# Patient Record
Sex: MALE | Race: WHITE | NOT HISPANIC OR LATINO | Employment: OTHER | URBAN - METROPOLITAN AREA
[De-identification: names, ages, dates, MRNs, and addresses within clinical notes are randomized per-mention and may not be internally consistent; named-entity substitution may affect disease eponyms.]

---

## 2017-11-20 ENCOUNTER — OFFICE VISIT (OUTPATIENT)
Dept: OPHTHALMOLOGY | Facility: CLINIC | Age: 72
End: 2017-11-20
Attending: OPHTHALMOLOGY
Payer: MEDICARE

## 2017-11-20 DIAGNOSIS — H43.811 VITREORETINAL DEGENERATION OF RIGHT EYE: ICD-10-CM

## 2017-11-20 DIAGNOSIS — H35.373 EPIRETINAL MEMBRANE (ERM) OF BOTH EYES: Primary | ICD-10-CM

## 2017-11-20 DIAGNOSIS — H43.812 VITREOUS DEGENERATION AND DETACHMENT OF LEFT EYE: ICD-10-CM

## 2017-11-20 PROCEDURE — 92134 CPTRZ OPH DX IMG PST SGM RTA: CPT | Mod: ZF | Performed by: OPHTHALMOLOGY

## 2017-11-20 PROCEDURE — 99215 OFFICE O/P EST HI 40 MIN: CPT | Mod: ZF

## 2017-11-20 RX ORDER — OMEGA-3S/DHA/EPA/FISH OIL/D3 300MG-1000
1000 CAPSULE ORAL DAILY
COMMUNITY
End: 2017-11-20 | Stop reason: DRUGHIGH

## 2017-11-20 RX ORDER — MINOXIDIL 2 %
1 SOLUTION, NON-ORAL TOPICAL EVERY MORNING
COMMUNITY
Start: 2011-02-22 | End: 2021-05-25

## 2017-11-20 ASSESSMENT — TONOMETRY
OS_IOP_MMHG: 12
IOP_METHOD: TONOPEN
OD_IOP_MMHG: 14

## 2017-11-20 ASSESSMENT — VISUAL ACUITY
METHOD_MR: PT DEFERS
OD_CC: J1+
OD_CC: 20/25
OD_CC+: -3
CORRECTION_TYPE: GLASSES
OS_CC+: -2+1
OS_CC: 20/40 SLOW
METHOD: SNELLEN - LINEAR
OS_CC: J2
OS_PH_CC: 20/30 SLOW

## 2017-11-20 ASSESSMENT — REFRACTION_WEARINGRX
OD_SPHERE: -6.00
SPECS_TYPE: BIFOCAL
OS_ADD: +2.75
OS_AXIS: 108
OD_AXIS: 100
OD_CYLINDER: +1.00
OS_CYLINDER: +1.50
OS_SPHERE: -9.50
OD_ADD: +2.75

## 2017-11-20 ASSESSMENT — CONF VISUAL FIELD
OD_NORMAL: 1
OS_NORMAL: 1

## 2017-11-20 ASSESSMENT — SLIT LAMP EXAM - LIDS
COMMENTS: NORMAL
COMMENTS: NORMAL

## 2017-11-20 ASSESSMENT — CUP TO DISC RATIO
OD_RATIO: 0.2
OS_RATIO: 0.2

## 2017-11-20 ASSESSMENT — EXTERNAL EXAM - RIGHT EYE: OD_EXAM: NORMAL

## 2017-11-20 ASSESSMENT — EXTERNAL EXAM - LEFT EYE: OS_EXAM: NORMAL

## 2017-11-20 NOTE — NURSING NOTE
Chief Complaints and History of Present Illnesses   Patient presents with     Follow Up For     annual exam; history of RD with repair in both eyes with scleral buckle     HPI    Affected eye(s):  Both   Symptoms:     No decreased vision   No difficulty with reading   Floaters (Comment: occasional/longstanding)   No flashes   Redness (Comment: both eyes usually red upon awakening)      Duration:  1 year   Frequency:  Constant       Do you have eye pain now?:  No      Comments:  Pt here for annual eye exam; history of RD with repair with scleral anny in both eyes  Pt states vision stable at distance and near, noting some issues with arm's length distance  Pt states driving at night has become quite difficult - wants to discuss cataracts    Pt uses Red Eye Relief drops PRN (about 5 times a week) for redness    Ale Rose COA 7:35 AM November 20, 2017

## 2017-11-20 NOTE — MR AVS SNAPSHOT
After Visit Summary   11/20/2017    Filemon Modi    MRN: [id number]           Patient Information     Date Of Birth          1945        Visit Information        Provider Department      11/20/2017 8:00 AM Nicolette Lipscomb MD [facility]        Today's Diagnoses     Epiretinal membrane (ERM) of both eyes    -  1    Vitreous degeneration and detachment of left eye        Vitreoretinal degeneration of right eye           Follow-ups after your visit        Follow-up notes from your care team     Return in about 1 year (around 11/20/2018) for OCT OU.      Future tests that were ordered for you today     Open Future Orders        Priority Expected Expires Ordered    OCT Retina Spectralis OU (both eyes) Routine  5/24/2019 11/20/2017            Who to contact     Please call your clinic at [phone] to:    Ask questions about your health    Make or cancel appointments    Discuss your medicines    Learn about your test results    Speak to your doctor   If you have compliments or concerns about an experience at your clinic, or if you wish to file a complaint, please contact [facility] Patient Relations at [phone] or email us at [email or web address]         Additional Information About Your Visit        MyChart Information     [employer] gives you secure access to your electronic health record. If you see a primary care provider, you can also send messages to your care team and make appointments. If you have questions, please call your primary care clinic.  If you do not have a primary care provider, please call [phone] and they will assist you.      [employer] is an electronic gateway that provides easy, online access to your medical records. With [employer], you can request a clinic appointment, read your test results, renew a prescription or communicate with your care team.     To access your existing account, please contact your [facility] or call [phone] for assistance.        Care EveryWhere ID     This is your Care EveryWhere ID. This could be used by other organizations to access your [facility] medical records  [id number]         Blood Pressure from Last 3 Encounters:   06/23/12 130/88    Weight from Last 3 Encounters:   No data found for Wt              We Performed the Following     OCT Retina Spectralis OU (both eyes)          Today's Medication Changes          These changes are accurate as of: 11/20/17  8:49 AM.  If you have any questions, ask your nurse or doctor.               These medicines have changed or have updated prescriptions.        Dose/Directions    [facility] FISH OIL 1200 MG Caps   This may have changed:  Another medication with the same name was removed. Continue taking this medication, and follow the directions you see here.   Changed by:  Nicolette Lipscomb MD        Dose:  1 capsule   Take 1 capsule by mouth daily   Refills:  0       VITAMIN D PO   This may have changed:  Another medication with the same name was removed. Continue taking this medication, and follow the directions you see here.   Changed by:  Nicolette Lipscomb MD        Dose:  1000 mg   Take 1,000 mg by mouth daily   Refills:  0                Primary Care Provider Office Phone # Fax #    Shane Butler [phone]       [facility] [address]  [facility] [address]        Equal Access to Services     [facility] Hospital[facility] AH: Hadii cachorro ku hadasho Soomaali, waaxda luqadaha, qaybta kaalmada adeegyada, waxfaustina caputo . So [facility] [phone].    ATENCIÓN: Si habla español, tiene a stewart disposición servicios gratuitos de asistencia lingüística. Llame al [phone].    We comply with applicable federal civil rights laws and [place] laws. We do not discriminate on the basis of race, color, national origin, age, disability, sex, sexual orientation, or gender identity.             Thank you!     Thank you for choosing [facility] CLINIC  for your care. Our goal is always to provide you with excellent care. Hearing back from our patients is one way we can continue to improve our services. Please take a few minutes to complete the written survey that you may receive in the mail after your visit with us. Thank you!             Your Updated Medication List - Protect others around you: Learn how to safely use, store and throw away your medicines at www.disposemymeds.org.          This list is accurate as of: 11/20/17  8:49 AM.  Always use your most recent med list.                   Brand Name Dispense Instructions for use Diagnosis    ASPIRIN PO      Take 81 mg by mouth daily.        [facility] FISH OIL 1200 MG Caps      Take 1 capsule by mouth daily        LEVOTHROID PO      Take 125 mcg by mouth daily.        VITAMIN D PO      Take 1,000 mg by mouth daily

## 2017-11-20 NOTE — PROGRESS NOTES
CC -   H/o RD OU    INTERVAL HISTORY - Initial visit with me.  Saw SM 1 year ago.  Noticing glare & mild haze OU    HPI -   Filemon Modi is a  72 year old year-old patient with history of retinal detachment repair both eyes with SBP + small gas bubble.  OS VA worse than OD all life, unsure if ever correctable to 20/20, large anisometropia.      PAST OCULAR SURGERY  SBP OS 2006 (Sommer)  SBP OD 2011 (JT)      RETINAL IMAGING:  OCT  11-20-17  OD - tr ERM, PVD, stable  OS - tr ERM, PVD, stable      ASSESSMENT & PLAN    1. H/o RD repair OU with SBP + gas bubble   - 2006 OS & 2011 OD   - retina flat    2. ERM OU   - trace on OCT   - NVS    3.  PVD OU   - S/Sx RD    4. NS OU   - bothered by glare, may seek eval   - unclear vision potential OS   - BCVA 2009 was 20/25-2, typically 20/30-20/40   - may have mild amblyopia    5.  Anisometropia   - per patient WRx doesn't incorporate full OS correction               return to clinic: 1 year OCT OU    ATTESTATION     Attending Attestation:     Complete documentation of historical and exam elements from today's encounter can be found in the full encounter summary report (not reduplicated in this progress note).  I personally obtained the chief complaint(s) and history of present illness.  I confirmed and edited as necessary the review of systems, past medical/surgical history, family history, social history, and examination findings as documented by others; and I examined the patient myself.  I personally reviewed the relevant tests, images, and reports as documented above.  I formulated and edited as necessary the assessment and plan and discussed the findings and management plan with the patient and family    Nicolette Lipscomb MD, PhD  , Vitreoretinal Surgery  Department of Ophthalmology  [facility]

## 2019-01-07 DIAGNOSIS — H35.373 EPIRETINAL MEMBRANE (ERM) OF BOTH EYES: Primary | ICD-10-CM

## 2019-01-08 DIAGNOSIS — H25.13 NUCLEAR SENILE CATARACT OF BOTH EYES: Primary | ICD-10-CM

## 2019-01-09 ENCOUNTER — OFFICE VISIT (OUTPATIENT)
Dept: OPHTHALMOLOGY | Facility: CLINIC | Age: 74
End: 2019-01-09
Attending: OPHTHALMOLOGY
Payer: COMMERCIAL

## 2019-01-09 DIAGNOSIS — H35.373 EPIRETINAL MEMBRANE (ERM) OF BOTH EYES: ICD-10-CM

## 2019-01-09 DIAGNOSIS — H25.13 NUCLEAR SCLEROTIC CATARACT OF BOTH EYES: Primary | ICD-10-CM

## 2019-01-09 DIAGNOSIS — H52.13 HIGH MYOPIA, BOTH EYES: ICD-10-CM

## 2019-01-09 DIAGNOSIS — H04.123 DRY EYES, BILATERAL: ICD-10-CM

## 2019-01-09 DIAGNOSIS — Z86.69 HISTORY OF RETINAL DETACHMENT: ICD-10-CM

## 2019-01-09 DIAGNOSIS — H52.31 ANISOMETROPIA: ICD-10-CM

## 2019-01-09 PROCEDURE — 92015 DETERMINE REFRACTIVE STATE: CPT | Mod: ZF

## 2019-01-09 PROCEDURE — G0463 HOSPITAL OUTPT CLINIC VISIT: HCPCS | Mod: ZF

## 2019-01-09 PROCEDURE — 76516 ECHO EXAM OF EYE: CPT | Mod: ZF | Performed by: OPHTHALMOLOGY

## 2019-01-09 PROCEDURE — 92134 CPTRZ OPH DX IMG PST SGM RTA: CPT | Mod: ZF | Performed by: OPHTHALMOLOGY

## 2019-01-09 ASSESSMENT — SLIT LAMP EXAM - LIDS
COMMENTS: NORMAL
COMMENTS: NORMAL

## 2019-01-09 ASSESSMENT — VISUAL ACUITY
OS_BAT_HIGH: 20/50
OD_PH_CC: 20/30
OD_BAT_LOW: 20/30
OD_CC: 20/40
OS_PH_CC: 20/30
OS_CC: 20/40
METHOD: SNELLEN - LINEAR
OD_BAT_MED: 20/30
OS_BAT_LOW: 20/30
OD_BAT_HIGH: 20/30
OS_BAT_MED: 20/50
CORRECTION_TYPE: GLASSES

## 2019-01-09 ASSESSMENT — REFRACTION_MANIFEST
OS_ADD: +2.75
OD_ADD: +2.75
OS_SPHERE: -9.75
OD_AXIS: 100
OD_SPHERE: -6.50
OS_AXIS: 105
OS_CYLINDER: +1.50
OD_CYLINDER: +0.75

## 2019-01-09 ASSESSMENT — TONOMETRY
OS_IOP_MMHG: 12
IOP_METHOD: TONOPEN
OD_IOP_MMHG: 10

## 2019-01-09 ASSESSMENT — REFRACTION_WEARINGRX
OD_ADD: +2.75
OD_CYLINDER: +1.00
OD_SPHERE: -6.00
OS_ADD: +2.75
OS_SPHERE: -9.50
OS_AXIS: 108
SPECS_TYPE: BIFOCAL
OD_AXIS: 100
OS_CYLINDER: +1.50

## 2019-01-09 ASSESSMENT — EXTERNAL EXAM - RIGHT EYE: OD_EXAM: NORMAL

## 2019-01-09 ASSESSMENT — CONF VISUAL FIELD
OS_NORMAL: 1
OD_NORMAL: 1
METHOD: COUNTING FINGERS

## 2019-01-09 ASSESSMENT — CUP TO DISC RATIO
OS_RATIO: 0.2
OD_RATIO: 0.2

## 2019-01-09 ASSESSMENT — EXTERNAL EXAM - LEFT EYE: OS_EXAM: NORMAL

## 2019-01-09 NOTE — NURSING NOTE
Chief Complaints and History of Present Illnesses   Patient presents with     Yearly Exam     Chief Complaint(s) and History of Present Illness(es)     Yearly Exam     Laterality: both eyes    Onset: gradual    Onset: years ago    Quality: Gradual change in the va      Frequency: constantly    Associated symptoms: glare.  Negative for floaters, flashes and dryness    Treatments tried: no treatments    Pain scale: 0/10              Comments     Wanting to see where he is at for cataracts but is leaning more toward gls  Mellisa Fabian COT 7:13 AM January 9, 2019

## 2019-01-09 NOTE — PROGRESS NOTES
HPI:  Filemon Modi is 73 year old male who is here for a CEE and discussion of potential cataract surgery and new MRx. States that nighttime driving has been more difficult, due to glare with oncoming headlights.   History of retinal detachment repair both eyes with SBP + small gas bubble.  OS VA worse than OD all life, unsure if ever correctable to 20/20, large anisometropia.      PAST OCULAR SURGERY  SBP OS 2006 (Kemal)  SBP OD 2011 (PHIL)    RETINAL IMAGING:  OCT 12/9/2019  od - stable erm   Os - stable erm       ASSESSMENT & PLAN  (H25.13) Nuclear sclerotic cataract of both eyes  (primary encounter diagnosis)  Comment: Visually significant with BCVA of 20/25 and 20/30 with glare to 20/30 and 20/50. Moderate NS with cortical changes and PSC.    Dilates to: 8 mm  Alpha blockers/Flomax: None  Trauma/Pseudoxfoliation: SB both eyes  Fuchs dystrophy/guttae: None    Diabetes: No  Anticoagulation: None    Cyl: No toric.    We discussed the risks and benefits of cataract surgery, and informed consent was obtained.  Proceed with CE/IOL left eye followed by right eye. Discussed refractive target. He does not mind wearing glasses and would like to maintain some myopia. Vision may be limited by retinal detachment/repair and possible mild amblyopia left eye.    Surgical plan:  Topical  Faculty  Aim -1.50 right eye and -2.50 left eye     (H35.373) Epiretinal membrane (ERM) of both eyes  Comment: Stable  Plan: Monitor    (Z86.69) History of retinal detachment - Both Eyes  Comment: History of RD repair both eyes with SB and gas bubble (2006 left eye and 2011 right eye), stable today with retina flat  Plan: Observe    (H04.123) Dry eyes, bilateral  Comment: Inferior PEE both eyes.  Plan: Recommend avoid Visine, start ATs 2-3 x daily.    (H52.13) High myopia, both eyes  (H52.31) Anisometropia  Comment: Increased anisometropia after buckle left eye per patient.  Plan: Hold off on updating glasses Rx until after CE/IOL    Return for  scheduled procedure.     Vinayak Sanchez MD  Ophthalmology Resident  PGY-2    Teaching statement:  Complete documentation of historical and exam elements from today's encounter can be found in the full encounter summary report (not reduplicated in this progress note). I personally obtained the chief complaint(s) and history of present illness.  I confirmed and edited as necessary the review of systems, past medical/surgical history, family history, social history, and examination findings as documented by others; and I examined the patient myself. I personally reviewed the relevant tests, images, and reports as documented above.     I formulated and edited as necessary the assessment and plan and discussed the findings and management plan with the patient and family.    Ale Herndon MD  Comprehensive Ophthalmology & Ocular Pathology  Department of Ophthalmology and Visual Neurosciences  [email or web address]  Pager [phone]

## 2019-01-14 ENCOUNTER — PATIENT OUTREACH (OUTPATIENT)
Dept: CARE COORDINATION | Facility: CLINIC | Age: 74
End: 2019-01-14

## 2019-01-17 ENCOUNTER — APPOINTMENT (OUTPATIENT)
Dept: SURGERY | Facility: CLINIC | Age: 74
End: 2019-01-17
Payer: COMMERCIAL

## 2019-01-17 ENCOUNTER — TELEPHONE (OUTPATIENT)
Dept: OPHTHALMOLOGY | Facility: CLINIC | Age: 74
End: 2019-01-17

## 2019-01-17 ENCOUNTER — OFFICE VISIT (OUTPATIENT)
Dept: SURGERY | Facility: CLINIC | Age: 74
End: 2019-01-17
Payer: COMMERCIAL

## 2019-01-17 ENCOUNTER — ANESTHESIA EVENT (OUTPATIENT)
Dept: SURGERY | Facility: AMBULATORY SURGERY CENTER | Age: 74
End: 2019-01-17

## 2019-01-17 VITALS
HEART RATE: 67 BPM | TEMPERATURE: 97.9 F | OXYGEN SATURATION: 98 % | BODY MASS INDEX: 23.76 KG/M2 | HEIGHT: 74 IN | DIASTOLIC BLOOD PRESSURE: 82 MMHG | RESPIRATION RATE: 16 BRPM | WEIGHT: 185.1 LBS | SYSTOLIC BLOOD PRESSURE: 152 MMHG

## 2019-01-17 DIAGNOSIS — H26.9 CATARACT OF BOTH EYES, UNSPECIFIED CATARACT TYPE: ICD-10-CM

## 2019-01-17 DIAGNOSIS — H25.12 NUCLEAR SCLEROTIC CATARACT, LEFT: Primary | ICD-10-CM

## 2019-01-17 DIAGNOSIS — Z01.818 PREOP EXAMINATION: ICD-10-CM

## 2019-01-17 DIAGNOSIS — Z01.818 PREOP EXAMINATION: Primary | ICD-10-CM

## 2019-01-17 LAB
CREAT SERPL-MCNC: 0.74 MG/DL (ref 0.66–1.25)
GFR SERPL CREATININE-BSD FRML MDRD: >90 ML/MIN/{1.73_M2}
HGB BLD-MCNC: 16 G/DL (ref 13.3–17.7)
POTASSIUM SERPL-SCNC: 4.3 MMOL/L (ref 3.4–5.3)

## 2019-01-17 RX ORDER — OFLOXACIN 3 MG/ML
SOLUTION/ DROPS OPHTHALMIC
Qty: 1 BOTTLE | Refills: 0 | Status: SHIPPED | OUTPATIENT
Start: 2019-01-17 | End: 2019-02-18

## 2019-01-17 RX ORDER — PREDNISOLONE ACETATE 10 MG/ML
SUSPENSION/ DROPS OPHTHALMIC
Qty: 1 BOTTLE | Refills: 1 | Status: SHIPPED | OUTPATIENT
Start: 2019-01-17 | End: 2019-02-18

## 2019-01-17 ASSESSMENT — MIFFLIN-ST. JEOR: SCORE: 1659.36

## 2019-01-17 NOTE — PATIENT INSTRUCTIONS
Preparing for Your Surgery      Name:  Filemon Modi   MRN:  [id number]   :  1945   Today's Date:  2019     Arriving for surgery:  Surgery date:  19  Arrival time:  6:15 am    Please come to:  [facility]  [address] [place] [id number]     Parking is available in front of the Clinics and Surgery Center building from 5:30AM to 8:00PM.  -  Proceed to the 5th floor to check into the Ambulatory Surgery Center.              >> There will be patient concierges on the 1st and 5th floor, for assistance or an escort, if you would like.              >> Please call [phone] with any questions day of surgery.    -  Bring your ID and insurance card.    What can I eat or drink?  -  You may have solid food or milk products until 8 hours prior to your surgery. (Until 11:45 pm 19 )  -  You may have water, apple juice, clear BLACK coffee (NO creamer or nondairy creamer), or 7up/Sprite until 2 hours prior to your surgery. (Until 5:45 am )    Which medicines can I take?       -  Do not bring your own medications to the hospital.        -  Follow Ophthalmology Clinic instructions regarding Ibuprofen. If no instructions given, NO Ibuprofen the day prior to surgery.     -  Do NOT take these medications in the morning, the day of surgery:  Vitamin D, Fish Oil    -  Please take these medications the morning of surgery:  Levothyroxine      Acetaminophen (Tylenol)    How do I prepare myself?  -  Take two showers: one the night before surgery; and one the morning of surgery.         Use Scrubcare or Hibiclens to wash from neck down.  You may use your own shampoo and conditioner. No other hair products.   -  Do NOT use lotion, powder, colognes, deodorant, or antiperspirant the day of your surgery.  -  Do NOT wear any jewelry.    Questions or Concerns:  If you have questions or concerns prior to your surgery, call [phone]. (Mon - Fri   8 am- 5:30 pm)  If your surgery is  at the Ambulatory Surgery Center, please call [phone]. (Mon - Fri 8 am- 3:30 pm)  Questions after surgery, contact your Surgeons office.      AFTER YOUR SURGERY  Breathing exercises   Breathing exercises help you recover faster. Take deep breaths and let the air out slowly. This will:     Help you wake up after surgery.    Help prevent complications like pneumonia.  Preventing complications will help you go home sooner.   Nausea and vomiting   You may feel sick to your stomach after surgery; if so, let your nurse know.    Pain control:  After surgery, you may have pain. Our goal is to help you manage your pain. Pain medicine will help you feel comfortable enough to do activities that will help you heal.  These activities may include breathing exercises, walking and physical therapy.   To help your health care team treat your pain we will ask: 1) If you have pain  2) where it is located 3) describe your pain in your words  Methods of pain control include medications given by mouth, vein or by nerve block for some surgeries.  Sequential Compression Device (SCD) or Pneumo Boots:  You may need to wear SCD S on your legs or feet. These are wraps connected to a machine that pumps in air and releases it. The repeated pumping helps prevent blood clots from forming.

## 2019-01-17 NOTE — TELEPHONE ENCOUNTER
Called patient regarding procedure with Dr. Herndon on 1/18/19. Advised new surgery time 7: 45 am. Advised RN will go over instructions at Pre op appointment this afternoon.

## 2019-01-17 NOTE — RESULT ENCOUNTER NOTE
Elidia Colbert,    Your test results are attached.  All of your labs are within normal limits and good for surgery tomorrow.         Caty Colindres DNP, RN, ANP-C

## 2019-01-17 NOTE — ANESTHESIA PREPROCEDURE EVALUATION
Anesthesia Pre-Procedure Evaluation    Patient: Filemon Modi   MRN:     [id number] Gender:   male   Age:    73 year old :      1945        Preoperative Diagnosis: Visually Significant Cataract, Bilateral Eyes   Procedure(s):  Left Eye Cataract Removal with Intraocular Lens Implant     Past Medical History:   Diagnosis Date     Bilateral cataracts      Malignant neoplasm (H)     colon     Thyroid disease       Past Surgical History:   Procedure Laterality Date     ABDOMEN SURGERY      bowel resection     APPENDECTOMY       EYE SURGERY  2011    retinal detachment repair with scleral buckle     EYE SURGERY  2002    retinal reattachment for LE     HERNIA REPAIR      bilateral     RETINAL REATTACHMENT            Anesthesia Evaluation     . Pt has had prior anesthetic. Type: General and MAC    No history of anesthetic complications          ROS/MED HX    ENT/Pulmonary:  - neg pulmonary ROS     Neurologic:  - neg neurologic ROS     Cardiovascular:     (+) ----. : . . . :. . Previous cardiac testing date:results:date: results:ECG reviewed date: results:Sinus bradycardia date: results:          METS/Exercise Tolerance:  >4 METS   Hematologic:     (+) History of Transfusion no previous transfusion reaction -      Musculoskeletal:   (+) , , other musculoskeletal- periodic low back pain secondary to DDD      GI/Hepatic:  - neg GI/hepatic ROS       Renal/Genitourinary:  - ROS Renal section negative       Endo:     (+) thyroid problem hypothyroidism, .      Psychiatric:  - neg psychiatric ROS       Infectious Disease:  - neg infectious disease ROS       Malignancy:   (+) Malignancy History of GI  GI CA  Remission status post Surgery,         Other:    (+) no H/O Chronic Pain,                       PHYSICAL EXAM:   Mental Status/Neuro: A/A/O   Airway: Facies: Feasible  Mallampati: I  Mouth/Opening: Full  TM distance: > 6 cm  Neck ROM: Full   Respiratory: Auscultation: CTAB     Resp. Rate: Normal     Resp. Effort:  "Normal      CV: Rhythm: Regular  Rate: Age appropriate  Heart: Normal Sounds   Comments:      Dental: Normal                  Lab Results   Component Value Date    WBC 6.5 05/31/2011    HGB 15.7 05/31/2011    HCT 47.2 05/31/2011     05/31/2011    SED 1 04/20/2006     04/20/2006    POTASSIUM 3.5 04/20/2006    CHLORIDE 102 04/20/2006    CO2 28 04/20/2006    BUN 15 04/20/2006    CR 0.96 04/20/2006     (H) 04/20/2006    BRYCE 8.9 04/20/2006    ALBUMIN 4.4 04/20/2006    PROTTOTAL 7.0 04/20/2006    ALT 24 04/20/2006    AST 21 04/20/2006    ALKPHOS 74 04/20/2006    BILITOTAL 0.5 04/20/2006    LIPASE 64 04/20/2006    AMYLASE 60 04/20/2006    PTT 24 04/20/2006    INR 0.98 04/20/2006       Preop Vitals  BP Readings from Last 3 Encounters:   01/17/19 (!) 164/97   06/23/12 130/88    Pulse Readings from Last 3 Encounters:   01/17/19 67   06/23/12 60      Resp Readings from Last 3 Encounters:   01/17/19 16   06/23/12 18    SpO2 Readings from Last 3 Encounters:   01/17/19 98%   06/23/12 100%      Temp Readings from Last 1 Encounters:   01/17/19 97.9  F (36.6  C) (Oral)    Ht Readings from Last 1 Encounters:   01/17/19 1.88 m (6' 2\")      Wt Readings from Last 1 Encounters:   01/17/19 84 kg (185 lb 1.6 oz)    Estimated body mass index is 23.77 kg/m  as calculated from the following:    Height as of this encounter: 1.88 m (6' 2\").    Weight as of this encounter: 84 kg (185 lb 1.6 oz).     LDA:            Assessment:   ASA SCORE: 2       Documentation: H&P complete; Preop Testing complete; Consents complete   Proceeding: Proceed without further delay  Tobacco Use:  Active user of Tobacco     Plan:   Anes. Type:  MAC   Pre-Induction: Midazolam IV   Induction:  Not applicable   Airway: Native Airway   Access/Monitoring: PIV   Maintenance: N/a   Emergence: N/a   Logistics: Same Day Surgery     Postop Pain/Sedation Strategy:  Standard-Options: Opioids PRN     PONV Management:  Adult Risk Factors:, Postop " Opioids  Prevention: Ondansetron     CONSENT: Direct conversation   Plan and risks discussed with: Patient   Blood Products: Consent Deferred (Minimal Blood Loss)       Comments for Plan/Consent:  Plan:  MAC with routine monitors    Rafi Damian MD                  PAC Discussion and Assessment    ASA Classification: 2  Case is suitable for: [place]  Anesthetic techniques and relevant risks discussed: GA  Invasive monitoring and risk discussed:   Types:   Possibility and Risk of blood transfusion discussed:   NPO instructions given:   Additional anesthetic preparation and risks discussed:   Needs early admission to pre-op area:   Other:     PAC Resident/NP Anesthesia Assessment:  Filemon Modi is a 73 year old male scheduled for Left Eye Cataract Removal with Intraocular Lens Implant on 1/18/2019 and a right Eye Cataract Removal with Intraocular Lens Implant on 1/25/2019 by Dr. Herndon in treatment of bilateral cataracts.  PAC referral for risk assessment and optimization for anesthesia with comorbid conditions of: hypothyroidism and history of colon cancer.    Pre-operative considerations:  1.  Cardiac:  Functional status- METS >4.  He exercises regularly at the gym doing stretching, cardio and weight training.  He has no known cardiac conditions.  Low risk surgery with 0.4% risk of major adverse cardiac event.   2.  Pulm:  Airway feasible.  JONO risk: low.   3.  GI:  Risk of PONV score = 1.  If > 2, anti-emetic intervention recommended.  4. Musculoskeletal:  He has periodic low back pain.  Consider cautious positioning if indicated.     VTE risk: 1.8%    Patient is optimized and is acceptable candidate for the proposed procedure.  No further diagnostic evaluation is needed.       **For further details of assessment, testing, and physical exam please see H and P completed on same date.          Caty Colindres DNP, RN, APRN      Reviewed and Signed by PAC Mid-Level Provider/Resident  Mid-Level Provider/Resident:  Caty Colindres DNP, RN, APRN  Date: 1/17/2019  Time: 1530    Attending Anesthesiologist Anesthesia Assessment:        Anesthesiologist:   Date:   Time:   Pass/Fail:   Disposition:     PAC Pharmacist Assessment:        Pharmacist:   Date:   Time:        Caty Colindres, APRN CNP

## 2019-01-17 NOTE — H&P
Pre-Operative H & P     CC:  Preoperative exam to assess for increased cardiopulmonary risk while undergoing surgery and anesthesia.    Date of Encounter: 1/17/2019  Primary Care Physician:  Joaquim Velez  Filemon Modi is a 73 year old male who presents for pre-operative H & P in preparation for Left Eye Cataract Removal with Intraocular Lens Implant with Dr. Herndon 1/18/2019 and right Eye Cataract Removal with Intraocular Lens Implant on 1/25/2019 at [facility].     Filemon Modi is a 73 year old male with hypothyroidism and history of colon cancer that has bilateral cataracts.  Per Dr. Herndon's note, these are visibly significant cataracts.  He notes noticeable impairment with night time driving.  The above listed surgeries have been recommended for treatment.     History is obtained from the patient.     Past Medical History  Past Medical History:   Diagnosis Date     Bilateral cataracts      Malignant neoplasm (H)     colon     Thyroid disease        Past Surgical History  Past Surgical History:   Procedure Laterality Date     ABDOMEN SURGERY      bowel resection     APPENDECTOMY       COLONOSCOPY  01/04/2018     EYE SURGERY  6/01/2011    retinal detachment repair with scleral buckle     EYE SURGERY  2002    retinal reattachment for LE     HERNIA REPAIR      x2     RETINAL REATTACHMENT      x2       Hx of Blood transfusions/reactions: yes, no reactions    Hx of abnormal bleeding or anti-platelet use: none      Steroid use in the last year: none    Personal or FH with difficulty with Anesthesia:  none    Prior to Admission Medications  Current Outpatient Medications   Medication Sig Dispense Refill     Cholecalciferol (VITAMIN D PO) Take 1,000 mg by mouth every morning        Levothyroxine Sodium (LEVOTHROID PO) Take 125 mcg by mouth every morning        Omega-3 Fatty Acids ([employer] FISH OIL) 1200 MG CAPS Take 1 capsule by mouth every morning        ofloxacin (OCUFLOX) 0.3 % ophthalmic  solution 1 drop 4x daily in the surgical eye for 1 week after surgery, then stop 1 Bottle 0     prednisoLONE acetate (PRED FORTE) 1 % ophthalmic suspension 1 drop in surgical eye as directed, 4x daily after surgery for 1 week, 3x daily for 1 week, 2x daily for 1 week, daily for 1 week, then stop 1 Bottle 1       Allergies  No Known Allergies    Social History  Social History     Socioeconomic History     Marital status: Single     Spouse name: Not on file     Number of children: 0     Years of education: Not on file     Highest education level: Not on file   Social Needs     Financial resource strain: Not on file     Food insecurity - worry: Not on file     Food insecurity - inability: Not on file     Transportation needs - medical: Not on file     Transportation needs - non-medical: Not on file   Occupational History     Occupation: retired   Tobacco Use     Smoking status: Never Smoker     Smokeless tobacco: Never Used   Substance and Sexual Activity     Alcohol use: Yes     Alcohol/week: 4.2 oz     Types: 7 Glasses of wine per week     Comment: wine daily     Drug use: Yes     Types: Marijuana     Sexual activity: Not on file   Other Topics Concern     Not on file   Social History Narrative     Not on file       Family History  Family History   Problem Relation Age of Onset     Chronic Obstructive Pulmonary Disease Mother      Alzheimer Disease Mother      Hypertension Mother      Alcoholism Father      Back Pain Father      Pneumonia Father          from pneumonia s/p having a stroke during a cholecystectomy     Cerebrovascular Disease Father      Hyperlipidemia Brother      Thyroid Disease Brother      Cerebrovascular Disease Maternal Grandfather      Myocardial Infarction Maternal Grandfather      Suicide Brother      Other - See Comments Brother          from hyperthermia in a sauna     Lymphoma Brother      Unknown/Adopted Paternal Grandfather      Glaucoma No family hx of      Macular Degeneration  "No family hx of                ROS/MED HX    The complete review of systems is negative other than noted in the HPI or here.   ENT/Pulmonary:  - neg pulmonary ROS     Neurologic:  - neg neurologic ROS     Cardiovascular:     (+) ----. : . . . :. . Previous cardiac testing date:results:date: results:ECG reviewed date:2013 results:Sinus bradycardia date: results:          METS/Exercise Tolerance:  >4 METS   Hematologic:     (+) History of Transfusion no previous transfusion reaction -      Musculoskeletal:   (+) , , other musculoskeletal- periodic low back pain secondary to DDD      GI/Hepatic:  - neg GI/hepatic ROS       Renal/Genitourinary:  - ROS Renal section negative       Endo:     (+) thyroid problem hypothyroidism, .      Psychiatric:  - neg psychiatric ROS       Infectious Disease:  - neg infectious disease ROS       Malignancy:   (+) Malignancy History of GI  GI CA  Remission status post Surgery,         Other:    (+) no H/O Chronic Pain,                   Temp: 97.9  F (36.6  C) Temp src: Oral BP: 152/82 Pulse: 67   Resp: 16 SpO2: 98 %         185 lbs 1.6 oz  6' 2\"   Body mass index is 23.77 kg/m .       Physical Exam  Constitutional: Awake, alert, cooperative, no apparent distress, and appears stated age.  Eyes: Pupils equal, round and reactive to light, extra ocular muscles intact, sclera clear, conjunctiva normal.  HENT: Normocephalic, oral pharynx with moist mucus membranes. Dentition - upper bridge, multiple caps. No goiter appreciated.   Respiratory: Clear to auscultation bilaterally, no crackles or wheezing.  Cardiovascular: Regular rate and rhythm, normal S1 and S2, and no murmur noted.  Carotids +2, no bruits. No edema. Palpable pulses to radial  DP and PT arteries.   GI: Normal bowel sounds, soft, non-distended, non-tender, no masses palpated, no hepatosplenomegaly.    Lymph/Hematologic: No cervical lymphadenopathy and no supraclavicular lymphadenopathy.  Genitourinary:  deferred  Skin: Warm and " dry.  No rashes at anticipated surgical site.   Musculoskeletal: Full ROM of neck. There is no redness, warmth, or swelling of the exposed joints. Gross motor strength is normal.    Neurologic: Awake, alert, oriented to name, place and time. Cranial nerves II-XII are grossly intact. Gait is normal.   Neuropsychiatric: Calm, cooperative. Normal affect.     Labs: (personally reviewed)   Component      Latest Ref Rng & Units 2019   Creatinine      0.66 - 1.25 mg/dL 0.74   GFR Estimate      >60 mL/min/1.73:m2 >90   GFR Estimate If Black      >60 mL/min/1.73:m2 >90   Potassium      3.4 - 5.3 mmol/L 4.3   Hemoglobin      13.3 - 17.7 g/dL 16.0       EK  Sinus iesha      Outside records reviewed from:   Care Everywhere      ASSESSMENT and PLAN  Filemon Modi is a 73 year old male scheduled for Left Eye Cataract Removal with Intraocular Lens Implant on 2019 and a right Eye Cataract Removal with Intraocular Lens Implant on 2019 by Dr. Herndon in treatment of bilateral cataracts.  PAC referral for risk assessment and optimization for anesthesia with comorbid conditions of: hypothyroidism and history of colon cancer.    Pre-operative considerations:  1.  Cardiac:  Functional status- METS >4.  He exercises regularly at the gym doing stretching, cardio and weight training.  He has no known cardiac conditions.  Low risk surgery with 0.4% risk of major adverse cardiac event.   2.  Pulm:  Airway feasible.  JONO risk: low.   3.  GI:  Risk of PONV score = 1.  If > 2, anti-emetic intervention recommended.  4. Musculoskeletal:  He has periodic low back pain.  Consider cautious positioning if indicated.     VTE risk: 1.8%    Patient is optimized and is acceptable candidate for the proposed procedure.  No further diagnostic evaluation is needed.                 Caty Colindres DNP, RN, APRN  Preoperative Assessment Center  [facility]  Clinic and Surgery Center  Phone: [phone]  Fax: [phone]

## 2019-01-18 ENCOUNTER — OFFICE VISIT (OUTPATIENT)
Dept: OPHTHALMOLOGY | Facility: CLINIC | Age: 74
End: 2019-01-18
Payer: COMMERCIAL

## 2019-01-18 ENCOUNTER — HOSPITAL ENCOUNTER (OUTPATIENT)
Facility: AMBULATORY SURGERY CENTER | Age: 74
End: 2019-01-18
Attending: OPHTHALMOLOGY
Payer: COMMERCIAL

## 2019-01-18 ENCOUNTER — ANESTHESIA (OUTPATIENT)
Dept: SURGERY | Facility: AMBULATORY SURGERY CENTER | Age: 74
End: 2019-01-18

## 2019-01-18 VITALS
OXYGEN SATURATION: 98 % | DIASTOLIC BLOOD PRESSURE: 89 MMHG | BODY MASS INDEX: 23.76 KG/M2 | RESPIRATION RATE: 16 BRPM | HEIGHT: 74 IN | HEART RATE: 43 BPM | SYSTOLIC BLOOD PRESSURE: 142 MMHG | WEIGHT: 185.1 LBS | TEMPERATURE: 98 F

## 2019-01-18 DIAGNOSIS — H25.12 NUCLEAR SCLEROTIC CATARACT, LEFT: Primary | ICD-10-CM

## 2019-01-18 DIAGNOSIS — Z96.1 PSEUDOPHAKIA, LEFT EYE: Primary | ICD-10-CM

## 2019-01-18 DEVICE — EYE IMP IOL AMO PCL TECNIS ZCB00 14.5: Type: IMPLANTABLE DEVICE | Site: EYE | Status: FUNCTIONAL

## 2019-01-18 RX ORDER — MEPERIDINE HYDROCHLORIDE 25 MG/ML
12.5 INJECTION INTRAMUSCULAR; INTRAVENOUS; SUBCUTANEOUS
Status: DISCONTINUED | OUTPATIENT
Start: 2019-01-18 | End: 2019-01-19 | Stop reason: HOSPADM

## 2019-01-18 RX ORDER — SODIUM CHLORIDE, SODIUM LACTATE, POTASSIUM CHLORIDE, CALCIUM CHLORIDE 600; 310; 30; 20 MG/100ML; MG/100ML; MG/100ML; MG/100ML
INJECTION, SOLUTION INTRAVENOUS CONTINUOUS
Status: DISCONTINUED | OUTPATIENT
Start: 2019-01-18 | End: 2019-01-19 | Stop reason: HOSPADM

## 2019-01-18 RX ORDER — ACETAMINOPHEN 325 MG/1
975 TABLET ORAL ONCE
Status: COMPLETED | OUTPATIENT
Start: 2019-01-18 | End: 2019-01-18

## 2019-01-18 RX ORDER — TETRACAINE HYDROCHLORIDE 5 MG/ML
SOLUTION OPHTHALMIC PRN
Status: DISCONTINUED | OUTPATIENT
Start: 2019-01-18 | End: 2019-01-18 | Stop reason: HOSPADM

## 2019-01-18 RX ORDER — ONDANSETRON 4 MG/1
4 TABLET, ORALLY DISINTEGRATING ORAL EVERY 30 MIN PRN
Status: DISCONTINUED | OUTPATIENT
Start: 2019-01-18 | End: 2019-01-19 | Stop reason: HOSPADM

## 2019-01-18 RX ORDER — ONDANSETRON 2 MG/ML
INJECTION INTRAMUSCULAR; INTRAVENOUS PRN
Status: DISCONTINUED | OUTPATIENT
Start: 2019-01-18 | End: 2019-01-18

## 2019-01-18 RX ORDER — MOXIFLOXACIN IN NACL,ISO-OS/PF 0.3MG/0.3
SYRINGE (ML) INTRAOCULAR PRN
Status: DISCONTINUED | OUTPATIENT
Start: 2019-01-18 | End: 2019-01-18 | Stop reason: HOSPADM

## 2019-01-18 RX ORDER — FENTANYL CITRATE 50 UG/ML
INJECTION, SOLUTION INTRAMUSCULAR; INTRAVENOUS PRN
Status: DISCONTINUED | OUTPATIENT
Start: 2019-01-18 | End: 2019-01-18

## 2019-01-18 RX ORDER — PROPARACAINE HYDROCHLORIDE 5 MG/ML
1 SOLUTION/ DROPS OPHTHALMIC ONCE
Status: COMPLETED | OUTPATIENT
Start: 2019-01-18 | End: 2019-01-18

## 2019-01-18 RX ORDER — FENTANYL CITRATE 50 UG/ML
25-50 INJECTION, SOLUTION INTRAMUSCULAR; INTRAVENOUS
Status: DISCONTINUED | OUTPATIENT
Start: 2019-01-18 | End: 2019-01-19 | Stop reason: HOSPADM

## 2019-01-18 RX ORDER — FENTANYL CITRATE 50 UG/ML
25-50 INJECTION, SOLUTION INTRAMUSCULAR; INTRAVENOUS
Status: DISCONTINUED | OUTPATIENT
Start: 2019-01-18 | End: 2019-01-18 | Stop reason: HOSPADM

## 2019-01-18 RX ORDER — OXYCODONE HYDROCHLORIDE 5 MG/1
5 TABLET ORAL EVERY 4 HOURS PRN
Status: DISCONTINUED | OUTPATIENT
Start: 2019-01-18 | End: 2019-01-19 | Stop reason: HOSPADM

## 2019-01-18 RX ORDER — SODIUM CHLORIDE, SODIUM LACTATE, POTASSIUM CHLORIDE, CALCIUM CHLORIDE 600; 310; 30; 20 MG/100ML; MG/100ML; MG/100ML; MG/100ML
INJECTION, SOLUTION INTRAVENOUS CONTINUOUS
Status: DISCONTINUED | OUTPATIENT
Start: 2019-01-18 | End: 2019-01-18 | Stop reason: HOSPADM

## 2019-01-18 RX ORDER — HYDRALAZINE HYDROCHLORIDE 20 MG/ML
2.5-5 INJECTION INTRAMUSCULAR; INTRAVENOUS EVERY 10 MIN PRN
Status: DISCONTINUED | OUTPATIENT
Start: 2019-01-18 | End: 2019-01-18 | Stop reason: HOSPADM

## 2019-01-18 RX ORDER — CYCLOPENTOLATE HYDROCHLORIDE 10 MG/ML
1 SOLUTION/ DROPS OPHTHALMIC
Status: COMPLETED | OUTPATIENT
Start: 2019-01-18 | End: 2019-01-18

## 2019-01-18 RX ORDER — NALOXONE HYDROCHLORIDE 0.4 MG/ML
.1-.4 INJECTION, SOLUTION INTRAMUSCULAR; INTRAVENOUS; SUBCUTANEOUS
Status: DISCONTINUED | OUTPATIENT
Start: 2019-01-18 | End: 2019-01-19 | Stop reason: HOSPADM

## 2019-01-18 RX ORDER — ONDANSETRON 2 MG/ML
4 INJECTION INTRAMUSCULAR; INTRAVENOUS EVERY 30 MIN PRN
Status: DISCONTINUED | OUTPATIENT
Start: 2019-01-18 | End: 2019-01-19 | Stop reason: HOSPADM

## 2019-01-18 RX ORDER — LIDOCAINE HYDROCHLORIDE 10 MG/ML
INJECTION, SOLUTION EPIDURAL; INFILTRATION; INTRACAUDAL; PERINEURAL PRN
Status: DISCONTINUED | OUTPATIENT
Start: 2019-01-18 | End: 2019-01-18 | Stop reason: HOSPADM

## 2019-01-18 RX ORDER — PHENYLEPHRINE HYDROCHLORIDE 25 MG/ML
1 SOLUTION/ DROPS OPHTHALMIC
Status: COMPLETED | OUTPATIENT
Start: 2019-01-18 | End: 2019-01-18

## 2019-01-18 RX ORDER — FLURBIPROFEN SODIUM 0.3 MG/ML
1 SOLUTION/ DROPS OPHTHALMIC
Status: DISCONTINUED | OUTPATIENT
Start: 2019-01-18 | End: 2019-01-18 | Stop reason: HOSPADM

## 2019-01-18 RX ORDER — BALANCED SALT SOLUTION 6.4; .75; .48; .3; 3.9; 1.7 MG/ML; MG/ML; MG/ML; MG/ML; MG/ML; MG/ML
SOLUTION OPHTHALMIC PRN
Status: DISCONTINUED | OUTPATIENT
Start: 2019-01-18 | End: 2019-01-18 | Stop reason: HOSPADM

## 2019-01-18 RX ORDER — OFLOXACIN 3 MG/ML
1 SOLUTION/ DROPS OPHTHALMIC
Status: COMPLETED | OUTPATIENT
Start: 2019-01-18 | End: 2019-01-18

## 2019-01-18 RX ORDER — ACETAMINOPHEN 325 MG/1
975 TABLET ORAL ONCE
Status: DISCONTINUED | OUTPATIENT
Start: 2019-01-18 | End: 2019-01-18 | Stop reason: HOSPADM

## 2019-01-18 RX ADMIN — PROPARACAINE HYDROCHLORIDE 1 DROP: 5 SOLUTION/ DROPS OPHTHALMIC at 06:50

## 2019-01-18 RX ADMIN — ACETAMINOPHEN 975 MG: 325 TABLET ORAL at 06:47

## 2019-01-18 RX ADMIN — ONDANSETRON 4 MG: 2 INJECTION INTRAMUSCULAR; INTRAVENOUS at 07:32

## 2019-01-18 RX ADMIN — CYCLOPENTOLATE HYDROCHLORIDE 1 DROP: 10 SOLUTION/ DROPS OPHTHALMIC at 06:58

## 2019-01-18 RX ADMIN — OFLOXACIN 1 DROP: 3 SOLUTION/ DROPS OPHTHALMIC at 06:58

## 2019-01-18 RX ADMIN — OFLOXACIN 1 DROP: 3 SOLUTION/ DROPS OPHTHALMIC at 06:51

## 2019-01-18 RX ADMIN — CYCLOPENTOLATE HYDROCHLORIDE 1 DROP: 10 SOLUTION/ DROPS OPHTHALMIC at 07:05

## 2019-01-18 RX ADMIN — PHENYLEPHRINE HYDROCHLORIDE 1 DROP: 25 SOLUTION/ DROPS OPHTHALMIC at 06:58

## 2019-01-18 RX ADMIN — FLURBIPROFEN SODIUM 1 DROP: 0.3 SOLUTION/ DROPS OPHTHALMIC at 06:50

## 2019-01-18 RX ADMIN — FENTANYL CITRATE 50 MCG: 50 INJECTION, SOLUTION INTRAMUSCULAR; INTRAVENOUS at 07:32

## 2019-01-18 RX ADMIN — PHENYLEPHRINE HYDROCHLORIDE 1 DROP: 25 SOLUTION/ DROPS OPHTHALMIC at 07:05

## 2019-01-18 RX ADMIN — FLURBIPROFEN SODIUM 1 DROP: 0.3 SOLUTION/ DROPS OPHTHALMIC at 06:58

## 2019-01-18 RX ADMIN — SODIUM CHLORIDE, SODIUM LACTATE, POTASSIUM CHLORIDE, CALCIUM CHLORIDE: 600; 310; 30; 20 INJECTION, SOLUTION INTRAVENOUS at 06:48

## 2019-01-18 RX ADMIN — OFLOXACIN 1 DROP: 3 SOLUTION/ DROPS OPHTHALMIC at 07:05

## 2019-01-18 RX ADMIN — CYCLOPENTOLATE HYDROCHLORIDE 1 DROP: 10 SOLUTION/ DROPS OPHTHALMIC at 06:51

## 2019-01-18 RX ADMIN — PHENYLEPHRINE HYDROCHLORIDE 1 DROP: 25 SOLUTION/ DROPS OPHTHALMIC at 06:50

## 2019-01-18 ASSESSMENT — VISUAL ACUITY
OS_SC: 20/40
METHOD: SNELLEN - LINEAR
OS_SC+: +2
OD_CC: 20/25
CORRECTION_TYPE: GLASSES

## 2019-01-18 ASSESSMENT — SLIT LAMP EXAM - LIDS: COMMENTS: NORMAL

## 2019-01-18 ASSESSMENT — MIFFLIN-ST. JEOR: SCORE: 1659.36

## 2019-01-18 ASSESSMENT — CUP TO DISC RATIO: OS_RATIO: 0.3

## 2019-01-18 ASSESSMENT — TONOMETRY
OD_IOP_MMHG: 14
IOP_METHOD: TONOPEN
OS_IOP_MMHG: 21

## 2019-01-18 ASSESSMENT — EXTERNAL EXAM - LEFT EYE: OS_EXAM: NORMAL

## 2019-01-18 NOTE — PROGRESS NOTES
#1 Pseudophakia, left eye, day 0    Doing well  Keep patch in place at night for 5 days  Start post-operative drops and taper according to instructions  Post-operative do's and don'ts reviewed, questions answered    Recheck 2-3 weeks with refraction    Vinayak Sanchez MD  Ophthalmology Resident  PGY-2     Teaching statement:  Complete documentation of historical and exam elements from today's encounter can be found in the full encounter summary report (not reduplicated in this progress note). I personally obtained the chief complaint(s) and history of present illness.  I confirmed and edited as necessary the review of systems, past medical/surgical history, family history, social history, and examination findings as documented by others; and I examined the patient myself. I personally reviewed the relevant tests, images, and reports as documented above.     I formulated and edited as necessary the assessment and plan and discussed the findings and management plan with the patient and family.    Ale Herndon MD  Comprehensive Ophthalmology & Ocular Pathology  Department of Ophthalmology and Visual Neurosciences  [email or web address]  Pager [phone]

## 2019-01-18 NOTE — ANESTHESIA CARE TRANSFER NOTE
Patient: Filemon Modi    Procedure(s):  Left Eye Cataract Removal with Intraocular Lens Implant    Diagnosis: Visually Significant Cataract, Bilateral Eyes  Diagnosis Additional Information: No value filed.    Anesthesia Type:   No value filed.     Note:  Airway :Room Air  Patient transferred to:Phase II  Comments: Uneventful transport to Phase II: VSS; IV patent; pt comfortable; Report given to RN; pt. Responds appropriately to commandsHandoff Report: Identifed the Patient, Identified the Reponsible Provider, Reviewed the pertinent medical history, Discussed the surgical course, Reviewed Intra-OP anesthesia mangement and issues during anesthesia, Set expectations for post-procedure period and Allowed opportunity for questions and acknowledgement of understanding      Vitals: (Last set prior to Anesthesia Care Transfer)    CRNA VITALS  1/18/2019 0722 - 1/18/2019 0753      1/18/2019             Pulse:  46  (Abnormal)     SpO2:  99 %    Resp Rate (observed):  1  (Abnormal)     Resp Rate (set):  10                Electronically Signed By: WYATT Ho CRNA  January 18, 2019  7:53 AM

## 2019-01-18 NOTE — PROCEDURES
Ophthalmology Post-op Procedure Note    Surgical Service:    Ophthalmology & Visual Sciences  Date Performed:      January 18, 2019  Location: [facility]      Pre-operative Diagnosis: Visually significant cataract, left eye  Post-operative Diagnosis:  Pseudophakia, left eye  Operative Procedure:  Phacoemulsification with intraocular lens implantation, left eye    Surgeon(s):  Fellow/Staff Surgeon:       Ale Herndon MD  Resident Surgeon:            None    Anesthesia:   Topical/MAC  Findings:  No unusual findings   Blood Loss:    Minimal  Implants:  ZCB00 14.5 intraocular lens  Specimens:  None     Complications:  The patient did not experience any complications.   Condition: Stable    Operative Report Completion:    Description of Operation/Procedure:  After appropriate informed consent was obtained, the patient was brought to the operating room. The appropriate cardiac and blood pressure monitors were placed. A final pause occurred just before the start of the procedure during which the entire procedure team actively confirmed the correct patient, procedure, site, special equipment and special requirements. The patient was prepped and draped in the usual sterile fashion using 5% povidone/iodine.     An eyelid speculum was placed to open the eyelids. A paracentesis port was placed approximately sixty degrees to the left of the planned temporal incision location using the sideport blade. Approximately 0.8 cc of 1% nonpreserved lidocaine was placed into the anterior chamber. The anterior chamber was filled with dispersive viscoelastic. A clear corneal temporal incision was made with a metal 2.6 mm keratome. A round continuous tear capsulorhexis was initiated with a cystotome and completed with the Utrata forceps. Balanced salt solution on a cannula was used to perform hydrodissection. The nucleus was removed using phacoemulsification with a chop technique. The remaining cortical material was removed using the  irrigation/aspiration handpiece. The capsular bag was filled with dispersive viscoelastic. A lens of the  model and power listed above was placed into the capsular bag using the cartridge injection system. The remaining viscoelastic was removed using the irrigation aspiration handpiece. The paracentesis and temporal wounds were hydrated with balanced salt solution. Intracameral moxifloxacin was administered. At the conclusion of the case, the wounds were felt to be watertight, the pupil was round, the lens was centered, and the anterior chamber was deep. A few drops of antibiotic and prednisolone were given to the operative eye. The eyelid speculum was removed. A shield was placed over the operative eye.      Attending Attestation:  I was present for and performed the entire procedure.  Ale Herndon MD

## 2019-01-18 NOTE — DISCHARGE INSTRUCTIONS
[facility]  Home Care Following Anesthesia  For 24 hours after surgery:  1. Get plenty of rest.  A responsible adult must stay with you for at least 24 hours after you leave the surgery center.  2. Do not drive or use heavy equipment.  If you have weakness or tingling, don't drive or use heavy equipment until this feeling goes away.   3. Do not drink alcohol.   4. Avoid strenuous or risky activities.  Ask for help when climbing stairs.  5. You may feel lightheaded.  IF so, sit for a few minutes before standing.  Have someone help you get up.   6. If you have nausea (feel sick to your stomach): Drink only clear liquids such as apple juice, ginger ale, broth or 7-Up.  Rest may also help.  Be sure to drink enough fluids.  Move to a regular diet as you feel able.   7. You may have a slight fever.  Call the doctor if your fever is over 100 F (37.7 C) (taken under the tongue) or lasts longer than 24 hours.  8. You may have a dry mouth, a sore throat, muscle aches or trouble sleeping. These should go away after 24 hours.  9. Do not make important or legal decisions.         Tips for taking pain medications  To get the best pain relief possible, remember these points:    Take pain medications as directed, before pain becomes severe.    Pain medication can upset your stomach: taking it with food may help.    Constipation is a common side effect of pain medication. Drink plenty of  fluids.    Eat foods high in fiber. Take a stool softener if recommended by your doctor or pharmacist.    Do not drink alcohol, drive or operate machinery while taking pain medications.    Ask about other ways to control pain, such as with heat, ice or relaxation.    Tylenol/Acetaminophen Consumption  To help encourage the safe use of acetaminophen, the makers of TYLENOL  have lowered the maximum daily dose for single-ingredient Extra Strength TYLENOL  (acetaminophen) products sold in the [place] from 8 pills per  day (4,000 mg) to 6 pills per day (3,000 mg). The dosing interval has also changed from 2 pills every 4-6 hours to 2 pills every 6 hours.    If you feel your pain relief is insufficient, you may take Tylenol/Acetaminophen in addition to your narcotic pain medication.     Be careful not to exceed 3,000 mg of Tylenol/Acetaminophen in a 24 hour period from all sources.    If you are taking extra strength Tylenol/acetaminophen (500 mg), the maximum dose is 6 tablets in 24 hours.    If you are taking regular strength acetaminophen (325 mg), the maximum dose is 9 tablets in 24 hours.    Call a doctor for any of the followin. Signs of infection (fever, growing tenderness at the surgery site, a large amount of drainage or bleeding, severe pain, foul-smelling drainage, redness, swelling).  2. It has been over 8 to 10 hours since surgery and you are still not able to urinate (pass water).  3. Headache for over 24 hours.  4. Numbness, tingling or weakness the day after surgery (if you had spinal anesthesia).  Your doctor is:       Dr. Ale Herndon, Ophthalmology: [phone]               Or dial [phone] and ask for the resident on call for:  Ophthalmology  For emergency care, call the:  [place]:  [phone] (TTY for hearing impaired: [phone])

## 2019-01-18 NOTE — NURSING NOTE
Chief Complaints and History of Present Illnesses   Patient presents with     Post Op (Ophthalmology) Left Eye     Left Eye Cataract Removal with Intraocular Lens Implant     Chief Complaint(s) and History of Present Illness(es)     Post Op (Ophthalmology) Left Eye     Laterality: left eye    Onset: sudden    Onset: hours ago    Quality: blurred vision    Severity: moderate    Frequency: constantly    Timing: throughout the day    Course: stable    Associated symptoms: Negative for eye pain    Pain scale: 0/10    Comments: Left Eye Cataract Removal with Intraocular Lens Implant              Comments     Left Eye Cataract Removal with Intraocular Lens Implant same day post op. A little itching but going away. No eye pain today. Vision is blurry. Patient states they have their post op drops from the pharmacy.     Saba Sexton COT 11:11 AM January 18, 2019

## 2019-01-18 NOTE — ANESTHESIA POSTPROCEDURE EVALUATION
Anesthesia POST Procedure Evaluation    Patient: Filemon Modi   MRN:     [id number] Gender:   male   Age:    73 year old :      1945        Preoperative Diagnosis: Visually Significant Cataract, Bilateral Eyes   Procedure(s):  Left Eye Cataract Removal with Intraocular Lens Implant   Postop Comments: No value filed.       Anesthesia Type:  MAC    Reportable Event: NO     PAIN: Uncomplicated   Sign Out status: Comfortable, Well controlled pain     PONV: No PONV   Sign Out status:  No Nausea or Vomiting     Neuro/Psych: Uneventful perioperative course   Sign Out Status: Preoperative baseline; Age appropriate mentation     Airway/Resp.: Uneventful perioperative course   Sign Out Status: Non labored breathing, age appropriate RR; Resp. Status within EXPECTED Parameters     CV: Uneventful perioperative course   Sign Out status: Appropriate BP and perfusion indices; Appropriate HR/Rhythm     Disposition:   Sign Out in:  PACU  Disposition:  Phase II; Home  Recovery Course: Uneventful  Follow-Up: Not required           Last Anesthesia Record Vitals:  CRNA VITALS  2019 0722 - 2019 0822      2019             Pulse:  46  (Abnormal)     SpO2:  99 %    Resp Rate (observed):  1  (Abnormal)     Resp Rate (set):  10          Last PACU/Preop Vitals:  Vitals:    19 0629 19 0757 19 0803   BP: 149/85 134/81 142/89   Pulse:  (!) 43 (!) 43   Resp: 16 16 16   Temp: 36.5  C (97.7  F) 36.1  C (97  F) 36.7  C (98  F)   SpO2: 97% 99% 98%         Electronically Signed By: Rafi Damian MD, 2019, 10:52 AM

## 2019-01-23 DIAGNOSIS — H25.11 NUCLEAR SCLEROTIC CATARACT, RIGHT: Primary | ICD-10-CM

## 2019-01-23 RX ORDER — PREDNISOLONE ACETATE 10 MG/ML
SUSPENSION/ DROPS OPHTHALMIC
Qty: 1 BOTTLE | Refills: 1 | Status: SHIPPED | OUTPATIENT
Start: 2019-01-23 | End: 2021-05-25

## 2019-01-23 RX ORDER — OFLOXACIN 3 MG/ML
SOLUTION/ DROPS OPHTHALMIC
Qty: 1 BOTTLE | Refills: 0 | Status: SHIPPED | OUTPATIENT
Start: 2019-01-23 | End: 2019-02-18

## 2019-01-24 ENCOUNTER — ANESTHESIA EVENT (OUTPATIENT)
Dept: SURGERY | Facility: AMBULATORY SURGERY CENTER | Age: 74
End: 2019-01-24

## 2019-01-25 ENCOUNTER — ANESTHESIA (OUTPATIENT)
Dept: SURGERY | Facility: AMBULATORY SURGERY CENTER | Age: 74
End: 2019-01-25

## 2019-01-25 ENCOUNTER — HOSPITAL ENCOUNTER (OUTPATIENT)
Facility: AMBULATORY SURGERY CENTER | Age: 74
End: 2019-01-25
Attending: OPHTHALMOLOGY
Payer: COMMERCIAL

## 2019-01-25 ENCOUNTER — OFFICE VISIT (OUTPATIENT)
Dept: OPHTHALMOLOGY | Facility: CLINIC | Age: 74
End: 2019-01-25
Payer: COMMERCIAL

## 2019-01-25 VITALS
HEART RATE: 48 BPM | HEIGHT: 74 IN | TEMPERATURE: 98.5 F | RESPIRATION RATE: 14 BRPM | SYSTOLIC BLOOD PRESSURE: 145 MMHG | DIASTOLIC BLOOD PRESSURE: 94 MMHG | OXYGEN SATURATION: 97 % | BODY MASS INDEX: 23.49 KG/M2 | WEIGHT: 183 LBS

## 2019-01-25 DIAGNOSIS — Z48.810 AFTERCARE FOLLOWING SURGERY OF A SENSE ORGAN: ICD-10-CM

## 2019-01-25 DIAGNOSIS — H25.11 NUCLEAR SCLEROTIC CATARACT, RIGHT: Primary | ICD-10-CM

## 2019-01-25 DIAGNOSIS — Z96.1 PSEUDOPHAKIA, LEFT EYE: Primary | ICD-10-CM

## 2019-01-25 DEVICE — EYE IMP IOL AMO PCL TECNIS ZCB00 16.0: Type: IMPLANTABLE DEVICE | Site: EYE | Status: FUNCTIONAL

## 2019-01-25 RX ORDER — PHENYLEPHRINE HYDROCHLORIDE 25 MG/ML
1 SOLUTION/ DROPS OPHTHALMIC
Status: COMPLETED | OUTPATIENT
Start: 2019-01-25 | End: 2019-01-25

## 2019-01-25 RX ORDER — BALANCED SALT SOLUTION 6.4; .75; .48; .3; 3.9; 1.7 MG/ML; MG/ML; MG/ML; MG/ML; MG/ML; MG/ML
SOLUTION OPHTHALMIC PRN
Status: DISCONTINUED | OUTPATIENT
Start: 2019-01-25 | End: 2019-01-25 | Stop reason: HOSPADM

## 2019-01-25 RX ORDER — FENTANYL CITRATE 50 UG/ML
25-50 INJECTION, SOLUTION INTRAMUSCULAR; INTRAVENOUS
Status: DISCONTINUED | OUTPATIENT
Start: 2019-01-25 | End: 2019-01-25 | Stop reason: HOSPADM

## 2019-01-25 RX ORDER — OXYCODONE HYDROCHLORIDE 5 MG/1
5 TABLET ORAL EVERY 4 HOURS PRN
Status: DISCONTINUED | OUTPATIENT
Start: 2019-01-25 | End: 2019-01-26 | Stop reason: HOSPADM

## 2019-01-25 RX ORDER — ONDANSETRON 2 MG/ML
INJECTION INTRAMUSCULAR; INTRAVENOUS PRN
Status: DISCONTINUED | OUTPATIENT
Start: 2019-01-25 | End: 2019-01-25

## 2019-01-25 RX ORDER — ONDANSETRON 2 MG/ML
4 INJECTION INTRAMUSCULAR; INTRAVENOUS EVERY 30 MIN PRN
Status: DISCONTINUED | OUTPATIENT
Start: 2019-01-25 | End: 2019-01-26 | Stop reason: HOSPADM

## 2019-01-25 RX ORDER — TETRACAINE HYDROCHLORIDE 5 MG/ML
SOLUTION OPHTHALMIC PRN
Status: DISCONTINUED | OUTPATIENT
Start: 2019-01-25 | End: 2019-01-25 | Stop reason: HOSPADM

## 2019-01-25 RX ORDER — SODIUM CHLORIDE, SODIUM LACTATE, POTASSIUM CHLORIDE, CALCIUM CHLORIDE 600; 310; 30; 20 MG/100ML; MG/100ML; MG/100ML; MG/100ML
INJECTION, SOLUTION INTRAVENOUS CONTINUOUS
Status: DISCONTINUED | OUTPATIENT
Start: 2019-01-25 | End: 2019-01-25 | Stop reason: HOSPADM

## 2019-01-25 RX ORDER — LIDOCAINE HYDROCHLORIDE 10 MG/ML
INJECTION, SOLUTION EPIDURAL; INFILTRATION; INTRACAUDAL; PERINEURAL PRN
Status: DISCONTINUED | OUTPATIENT
Start: 2019-01-25 | End: 2019-01-25 | Stop reason: HOSPADM

## 2019-01-25 RX ORDER — NALOXONE HYDROCHLORIDE 0.4 MG/ML
.1-.4 INJECTION, SOLUTION INTRAMUSCULAR; INTRAVENOUS; SUBCUTANEOUS
Status: DISCONTINUED | OUTPATIENT
Start: 2019-01-25 | End: 2019-01-26 | Stop reason: HOSPADM

## 2019-01-25 RX ORDER — MOXIFLOXACIN IN NACL,ISO-OS/PF 0.3MG/0.3
SYRINGE (ML) INTRAOCULAR PRN
Status: DISCONTINUED | OUTPATIENT
Start: 2019-01-25 | End: 2019-01-25 | Stop reason: HOSPADM

## 2019-01-25 RX ORDER — SODIUM CHLORIDE, SODIUM LACTATE, POTASSIUM CHLORIDE, CALCIUM CHLORIDE 600; 310; 30; 20 MG/100ML; MG/100ML; MG/100ML; MG/100ML
INJECTION, SOLUTION INTRAVENOUS CONTINUOUS
Status: DISCONTINUED | OUTPATIENT
Start: 2019-01-25 | End: 2019-01-26 | Stop reason: HOSPADM

## 2019-01-25 RX ORDER — ONDANSETRON 4 MG/1
4 TABLET, ORALLY DISINTEGRATING ORAL EVERY 30 MIN PRN
Status: DISCONTINUED | OUTPATIENT
Start: 2019-01-25 | End: 2019-01-26 | Stop reason: HOSPADM

## 2019-01-25 RX ORDER — FENTANYL CITRATE 50 UG/ML
INJECTION, SOLUTION INTRAMUSCULAR; INTRAVENOUS PRN
Status: DISCONTINUED | OUTPATIENT
Start: 2019-01-25 | End: 2019-01-25

## 2019-01-25 RX ORDER — HYDROMORPHONE HYDROCHLORIDE 1 MG/ML
.3-.5 INJECTION, SOLUTION INTRAMUSCULAR; INTRAVENOUS; SUBCUTANEOUS EVERY 10 MIN PRN
Status: DISCONTINUED | OUTPATIENT
Start: 2019-01-25 | End: 2019-01-26 | Stop reason: HOSPADM

## 2019-01-25 RX ORDER — OFLOXACIN 3 MG/ML
1 SOLUTION/ DROPS OPHTHALMIC
Status: COMPLETED | OUTPATIENT
Start: 2019-01-25 | End: 2019-01-25

## 2019-01-25 RX ORDER — ACETAMINOPHEN 325 MG/1
975 TABLET ORAL ONCE
Status: COMPLETED | OUTPATIENT
Start: 2019-01-25 | End: 2019-01-25

## 2019-01-25 RX ORDER — MEPERIDINE HYDROCHLORIDE 25 MG/ML
12.5 INJECTION INTRAMUSCULAR; INTRAVENOUS; SUBCUTANEOUS
Status: DISCONTINUED | OUTPATIENT
Start: 2019-01-25 | End: 2019-01-26 | Stop reason: HOSPADM

## 2019-01-25 RX ORDER — FLURBIPROFEN SODIUM 0.3 MG/ML
1 SOLUTION/ DROPS OPHTHALMIC
Status: DISCONTINUED | OUTPATIENT
Start: 2019-01-25 | End: 2019-01-25 | Stop reason: HOSPADM

## 2019-01-25 RX ORDER — CYCLOPENTOLATE HYDROCHLORIDE 10 MG/ML
1 SOLUTION/ DROPS OPHTHALMIC
Status: COMPLETED | OUTPATIENT
Start: 2019-01-25 | End: 2019-01-25

## 2019-01-25 RX ORDER — PROPARACAINE HYDROCHLORIDE 5 MG/ML
1 SOLUTION/ DROPS OPHTHALMIC ONCE
Status: COMPLETED | OUTPATIENT
Start: 2019-01-25 | End: 2019-01-25

## 2019-01-25 RX ADMIN — ACETAMINOPHEN 975 MG: 325 TABLET ORAL at 08:15

## 2019-01-25 RX ADMIN — ONDANSETRON 4 MG: 2 INJECTION INTRAMUSCULAR; INTRAVENOUS at 09:10

## 2019-01-25 RX ADMIN — CYCLOPENTOLATE HYDROCHLORIDE 1 DROP: 10 SOLUTION/ DROPS OPHTHALMIC at 08:22

## 2019-01-25 RX ADMIN — FLURBIPROFEN SODIUM 1 DROP: 0.3 SOLUTION/ DROPS OPHTHALMIC at 08:23

## 2019-01-25 RX ADMIN — CYCLOPENTOLATE HYDROCHLORIDE 1 DROP: 10 SOLUTION/ DROPS OPHTHALMIC at 08:15

## 2019-01-25 RX ADMIN — FLURBIPROFEN SODIUM 1 DROP: 0.3 SOLUTION/ DROPS OPHTHALMIC at 08:28

## 2019-01-25 RX ADMIN — PROPARACAINE HYDROCHLORIDE 1 DROP: 5 SOLUTION/ DROPS OPHTHALMIC at 08:15

## 2019-01-25 RX ADMIN — OFLOXACIN 1 DROP: 3 SOLUTION/ DROPS OPHTHALMIC at 08:28

## 2019-01-25 RX ADMIN — CYCLOPENTOLATE HYDROCHLORIDE 1 DROP: 10 SOLUTION/ DROPS OPHTHALMIC at 08:28

## 2019-01-25 RX ADMIN — OFLOXACIN 1 DROP: 3 SOLUTION/ DROPS OPHTHALMIC at 08:23

## 2019-01-25 RX ADMIN — PHENYLEPHRINE HYDROCHLORIDE 1 DROP: 25 SOLUTION/ DROPS OPHTHALMIC at 08:16

## 2019-01-25 RX ADMIN — OFLOXACIN 1 DROP: 3 SOLUTION/ DROPS OPHTHALMIC at 08:16

## 2019-01-25 RX ADMIN — SODIUM CHLORIDE, SODIUM LACTATE, POTASSIUM CHLORIDE, CALCIUM CHLORIDE: 600; 310; 30; 20 INJECTION, SOLUTION INTRAVENOUS at 08:15

## 2019-01-25 RX ADMIN — PHENYLEPHRINE HYDROCHLORIDE 1 DROP: 25 SOLUTION/ DROPS OPHTHALMIC at 08:29

## 2019-01-25 RX ADMIN — PHENYLEPHRINE HYDROCHLORIDE 1 DROP: 25 SOLUTION/ DROPS OPHTHALMIC at 08:23

## 2019-01-25 RX ADMIN — FLURBIPROFEN SODIUM 1 DROP: 0.3 SOLUTION/ DROPS OPHTHALMIC at 08:16

## 2019-01-25 RX ADMIN — FENTANYL CITRATE 50 MCG: 50 INJECTION, SOLUTION INTRAMUSCULAR; INTRAVENOUS at 09:10

## 2019-01-25 ASSESSMENT — TONOMETRY
IOP_METHOD: TONOPEN
OS_IOP_MMHG: 13
OD_IOP_MMHG: 10

## 2019-01-25 ASSESSMENT — EXTERNAL EXAM - RIGHT EYE: OD_EXAM: NORMAL

## 2019-01-25 ASSESSMENT — VISUAL ACUITY
OD_SC+: +1
OS_SC: 20/70
METHOD: SNELLEN - LINEAR
OD_SC: 20/40

## 2019-01-25 ASSESSMENT — MIFFLIN-ST. JEOR: SCORE: 1649.83

## 2019-01-25 ASSESSMENT — SLIT LAMP EXAM - LIDS: COMMENTS: NORMAL

## 2019-01-25 NOTE — DISCHARGE INSTRUCTIONS
[facility]  Home Care Following Anesthesia  For 24 hours after surgery:  1. Get plenty of rest.  A responsible adult must stay with you for at least 24 hours after you leave the surgery center.  2. Do not drive or use heavy equipment.  If you have weakness or tingling, don't drive or use heavy equipment until this feeling goes away.   3. Do not drink alcohol.   4. Avoid strenuous or risky activities.  Ask for help when climbing stairs.  5. You may feel lightheaded.  IF so, sit for a few minutes before standing.  Have someone help you get up.   6. If you have nausea (feel sick to your stomach): Drink only clear liquids such as apple juice, ginger ale, broth or 7-Up.  Rest may also help.  Be sure to drink enough fluids.  Move to a regular diet as you feel able.   7. You may have a slight fever.  Call the doctor if your fever is over 100 F (37.7 C) (taken under the tongue) or lasts longer than 24 hours.  8. You may have a dry mouth, a sore throat, muscle aches or trouble sleeping. These should go away after 24 hours.  9. Do not make important or legal decisions.               Tips for taking pain medications  To get the best pain relief possible, remember these points:    Take pain medications as directed, before pain becomes severe.    Pain medication can upset your stomach: taking it with food may help.    Constipation is a common side effect of pain medication. Drink plenty of  fluids.    Eat foods high in fiber. Take a stool softener if recommended by your doctor or pharmacist.    Do not drink alcohol, drive or operate machinery while taking pain medications.    Ask about other ways to control pain, such as with heat, ice or relaxation.    Tylenol/Acetaminophen Consumption  To help encourage the safe use of acetaminophen, the makers of TYLENOL  have lowered the maximum daily dose for single-ingredient Extra Strength TYLENOL  (acetaminophen) products sold in the [place] from 8  pills per day (4,000 mg) to 6 pills per day (3,000 mg). The dosing interval has also changed from 2 pills every 4-6 hours to 2 pills every 6 hours.    If you feel your pain relief is insufficient, you may take Tylenol/Acetaminophen in addition to your narcotic pain medication.     Be careful not to exceed 3,000 mg of Tylenol/Acetaminophen in a 24 hour period from all sources.    If you are taking extra strength Tylenol/acetaminophen (500 mg), the maximum dose is 6 tablets in 24 hours.    If you are taking regular strength acetaminophen (325 mg), the maximum dose is 9 tablets in 24 hours.    Call a doctor for any of the followin. Signs of infection (fever, growing tenderness at the surgery site, a large amount of drainage or bleeding, severe pain, foul-smelling drainage, redness, swelling).  2. It has been over 8 to 10 hours since surgery and you are still not able to urinate (pass water).  3. Headache for over 24 hours.  4. Numbness, tingling or weakness the day after surgery (if you had spinal anesthesia).  Your doctor is:       Dr. Ale Herndon, Ophthalmology: [phone]               Or dial [phone] and ask for the resident on call for:  Ophthalmology  For emergency care, call the:  [place] [facility]:  [phone] (TTY for hearing impaired: [phone])

## 2019-01-25 NOTE — ANESTHESIA PREPROCEDURE EVALUATION
Anesthesia Pre-Procedure Evaluation    Patient: Filemon Modi   MRN:     [id number] Gender:   male   Age:    73 year old :      1945        Preoperative Diagnosis: Visually Significant Cataract, Bilateral Eyes   Procedure(s):  Left Eye Cataract Removal with Intraocular Lens Implant     Past Medical History:   Diagnosis Date     Bilateral cataracts      Malignant neoplasm (H)     colon     Thyroid disease       Past Surgical History:   Procedure Laterality Date     ABDOMEN SURGERY      bowel resection     APPENDECTOMY       COLONOSCOPY  2018     EYE SURGERY  2011    retinal detachment repair with scleral buckle     EYE SURGERY  2002    retinal reattachment for LE     HERNIA REPAIR      x2     PHACOEMULSIFICATION WITH STANDARD INTRAOCULAR LENS IMPLANT Left 2019    Procedure: Left Eye Cataract Removal with Intraocular Lens Implant;  Surgeon: Ale Herndon MD;  Location: [facility]     RETINAL REATTACHMENT      x2          Anesthesia Evaluation     . Pt has had prior anesthetic. Type: General    No history of anesthetic complications          ROS/MED HX    ENT/Pulmonary:  - neg pulmonary ROS     Neurologic:  - neg neurologic ROS     Cardiovascular:     (+) ----. : . . . :. . Previous cardiac testing date:results:date: results:ECG reviewed date: results:Sinus bradycardia date: results:          METS/Exercise Tolerance:  >4 METS   Hematologic:     (+) History of Transfusion no previous transfusion reaction -      Musculoskeletal:   (+) , , other musculoskeletal- periodic low back pain secondary to DDD      GI/Hepatic:  - neg GI/hepatic ROS       Renal/Genitourinary:  - ROS Renal section negative       Endo:     (+) thyroid problem hypothyroidism, .      Psychiatric:  - neg psychiatric ROS       Infectious Disease:  - neg infectious disease ROS       Malignancy:   (+) Malignancy History of GI  GI CA  Remission status post Surgery,         Other:    (+) no H/O Chronic Pain,                        "PHYSICAL EXAM:   Mental Status/Neuro: A/A/O   Airway: Facies: Feasible  Mallampati: I  Mouth/Opening: Full  TM distance: > 6 cm  Neck ROM: Full   Respiratory: Auscultation: CTAB     Resp. Rate: Normal     Resp. Effort: Normal      CV: Rhythm: Regular  Rate: Age appropriate  Heart: Normal Sounds   Comments:      Dental: Normal                  Lab Results   Component Value Date    WBC 6.5 05/31/2011    HGB 16.0 01/17/2019    HCT 47.2 05/31/2011     05/31/2011    SED 1 04/20/2006     04/20/2006    POTASSIUM 4.3 01/17/2019    CHLORIDE 102 04/20/2006    CO2 28 04/20/2006    BUN 15 04/20/2006    CR 0.74 01/17/2019     (H) 04/20/2006    BRYCE 8.9 04/20/2006    ALBUMIN 4.4 04/20/2006    PROTTOTAL 7.0 04/20/2006    ALT 24 04/20/2006    AST 21 04/20/2006    ALKPHOS 74 04/20/2006    BILITOTAL 0.5 04/20/2006    LIPASE 64 04/20/2006    AMYLASE 60 04/20/2006    PTT 24 04/20/2006    INR 0.98 04/20/2006       Preop Vitals  BP Readings from Last 3 Encounters:   01/25/19 152/78   01/18/19 142/89   01/17/19 152/82    Pulse Readings from Last 3 Encounters:   01/25/19 (!) 48   01/18/19 (!) 43   01/17/19 67      Resp Readings from Last 3 Encounters:   01/25/19 16   01/18/19 16   01/17/19 16    SpO2 Readings from Last 3 Encounters:   01/25/19 98%   01/18/19 98%   01/17/19 98%      Temp Readings from Last 1 Encounters:   01/25/19 36.4  C (97.6  F) (Oral)    Ht Readings from Last 1 Encounters:   01/25/19 1.88 m (6' 2\")      Wt Readings from Last 1 Encounters:   01/25/19 83 kg (183 lb)    Estimated body mass index is 23.5 kg/m  as calculated from the following:    Height as of an earlier encounter on 1/25/19: 1.88 m (6' 2\").    Weight as of an earlier encounter on 1/25/19: 83 kg (183 lb).     LDA:  Peripheral IV 01/25/19 Left Upper forearm (Active)   Site Assessment WDL 1/25/2019  8:31 AM   Line Status Infusing 1/25/2019  8:31 AM   Phlebitis Scale 0-->no symptoms 1/25/2019  8:31 AM   Infiltration Scale 0 1/25/2019  8:31 AM "   Dressing Intervention New dressing  1/25/2019  8:31 AM   Number of days: 0            Assessment:   ASA SCORE: 2       Documentation: H&P complete; Preop Testing complete; Consents complete   Proceeding: Proceed without further delay  Tobacco Use:  NO Active use of Tobacco/UNKNOWN Tobacco use status     Plan:   Anes. Type:  MAC      Induction:  Not applicable   Airway: Native Airway   Access/Monitoring: PIV   Maintenance: N/a   Emergence: N/a   Logistics: Same Day Surgery     Postop Pain/Sedation Strategy:  Standard-Options: Opioids PRN     PONV Management:  Adult Risk Factors:, Non-Smoker, Postop Opioids  Prevention: Ondansetron     CONSENT: Direct conversation   Plan and risks discussed with: Patient   Blood Products: Consent Deferred (Minimal Blood Loss)                    PAC Discussion and Assessment    ASA Classification: 2  Case is suitable for: [place]  Anesthetic techniques and relevant risks discussed: GA  Invasive monitoring and risk discussed:   Types:   Possibility and Risk of blood transfusion discussed:   NPO instructions given:   Additional anesthetic preparation and risks discussed:   Needs early admission to pre-op area:   Other:     PAC Resident/NP Anesthesia Assessment:  Filemon Modi is a 73 year old male scheduled for Left Eye Cataract Removal with Intraocular Lens Implant on 1/18/2019 and a right Eye Cataract Removal with Intraocular Lens Implant on 1/25/2019 by Dr. Herndon in treatment of bilateral cataracts.  PAC referral for risk assessment and optimization for anesthesia with comorbid conditions of: hypothyroidism and history of colon cancer.    Pre-operative considerations:  1.  Cardiac:  Functional status- METS >4.  He exercises regularly at the gym doing stretching, cardio and weight training.  He has no known cardiac conditions.  Low risk surgery with 0.4% risk of major adverse cardiac event.   2.  Pulm:  Airway feasible.  JONO risk: low.   3.  GI:  Risk of PONV score = 1.  If > 2,  anti-emetic intervention recommended.  4. Musculoskeletal:  He has periodic low back pain.  Consider cautious positioning if indicated.     VTE risk: 1.8%    Patient is optimized and is acceptable candidate for the proposed procedure.  No further diagnostic evaluation is needed.       **For further details of assessment, testing, and physical exam please see H and P completed on same date.          Caty Colindres DNP, RN, APRN      Reviewed and Signed by PAC Mid-Level Provider/Resident  Mid-Level Provider/Resident: Caty Colindres DNP, RN, APRN  Date: 1/17/2019  Time: 1530    Attending Anesthesiologist Anesthesia Assessment:        Anesthesiologist:   Date:   Time:   Pass/Fail:   Disposition:     PAC Pharmacist Assessment:        Pharmacist:   Date:   Time:        Stiven Orellana MD, MD

## 2019-01-25 NOTE — ANESTHESIA CARE TRANSFER NOTE
Patient: Filemon Modi    Procedure(s):  Right Eye Cataract Removal with Intraocular Lens Implant    Diagnosis: Visually Significant Cataract, Bilateral Eyes  Diagnosis Additional Information: No value filed.    Anesthesia Type:   No value filed.     Note:  Airway :Room Air  Patient transferred to:Phase II  Handoff Report: Identifed the Patient, Identified the Reponsible Provider, Reviewed the pertinent medical history, Discussed the surgical course, Reviewed Intra-OP anesthesia mangement and issues during anesthesia, Set expectations for post-procedure period and Allowed opportunity for questions and acknowledgement of understanding      Vitals: (Last set prior to Anesthesia Care Transfer)    CRNA VITALS  1/25/2019 0901 - 1/25/2019 0932      1/25/2019             Pulse:  53    SpO2:  100 %    Resp Rate (observed):  1  (Abnormal)     Resp Rate (set):  10                Electronically Signed By: WYATT Storey CRNA  January 25, 2019  9:32 AM

## 2019-01-25 NOTE — ANESTHESIA POSTPROCEDURE EVALUATION
Anesthesia POST Procedure Evaluation    Patient: Filemon Modi   MRN:     [id number] Gender:   male   Age:    73 year old :      1945        Preoperative Diagnosis: Visually Significant Cataract, Bilateral Eyes   Procedure(s):  Right Eye Cataract Removal with Intraocular Lens Implant   Postop Comments: No value filed.       Anesthesia Type:  MAC    Reportable Event: NO     PAIN: Uncomplicated   Sign Out status: Comfortable, Well controlled pain     PONV: No PONV   Sign Out status:  No Nausea or Vomiting     Neuro/Psych: Uneventful perioperative course   Sign Out Status: Preoperative baseline; Age appropriate mentation     Airway/Resp.: Uneventful perioperative course   Sign Out Status: Non labored breathing, age appropriate RR; Resp. Status within EXPECTED Parameters     CV: Uneventful perioperative course   Sign Out status: Appropriate BP and perfusion indices; Appropriate HR/Rhythm     Disposition:   Sign Out in:  PACU  Disposition:  Phase II; Home  Recovery Course: Uneventful  Follow-Up: Not required           Last Anesthesia Record Vitals:  CRNA VITALS  2019 0901 - 2019 1001      2019             Pulse:  53    SpO2:  100 %    Resp Rate (observed):  1  (Abnormal)     Resp Rate (set):  10          Last PACU/Preop Vitals:  Vitals:    19 0700 19 0935   BP: 152/78 (!) 145/94   Pulse: (!) 48    Resp: 16 14   Temp: 36.4  C (97.6  F) 36.9  C (98.5  F)   SpO2: 98% 97%         Electronically Signed By: Stiven Orellana MD, MD, 2019, 10:18 AM

## 2019-01-25 NOTE — NURSING NOTE
Chief Complaints and History of Present Illnesses   Patient presents with     Post Op (Ophthalmology) Right Eye     POD#0 s/p CE/IOL RE     Chief Complaint(s) and History of Present Illness(es)     Post Op (Ophthalmology) Right Eye     Laterality: right eye    Associated symptoms: Negative for eye pain, floaters and flashes    Comments: POD#0 s/p CE/IOL RE              Comments     Patient notes that he is feeling okay, denies flashes or floaters.     Marta Robbins January 25, 2019 10:12 AM

## 2019-01-25 NOTE — PROGRESS NOTES
POD#0, status post cataract surgery, right eye     No complaints.  Denies eye pain.     Impression/Plan:  Pseudophakia, od: Postoperative day 0, good post-operative appearance. IOP reasonable.     - Fluoroquinolone antibiotic 4x daily in the surgical eye for 1 week  - Prednisolone 4x daily in the surgical eye for 1 week, then weekly taper        Eye protection at all times and eye shield at night for 1 week.     Limited activities with no exercise or heavy lifting for 1 week.     Instructed patient to contact us for decreasing vision, eye pain, new floaters or flashes of light or other concerning symptoms.     Written instructions given    Daron Rodrigues M.D.  PGY-3, Ophthalmology

## 2019-01-25 NOTE — PROCEDURES
Ophthalmology Post-op Procedure Note    Surgical Service:    Ophthalmology & Visual Sciences  Date Performed:      January 25, 2019  Location: [facility]      Pre-operative Diagnosis: Visually significant cataract, right eye  Post-operative Diagnosis:  Pseudophakia, right eye  Operative Procedure:  Phacoemulsification with intraocular lens implantation, right eye      Surgeon(s):  Fellow/Staff Surgeon:       Ale Herndon MD  Resident Surgeon:            Daron Rodrigues MD    Anesthesia:   Topical/MAc  Findings:  No unusual findings   Blood Loss:    Minimal  Implants:  ZCB00 16.0 intraocular lens  Specimens:  None     Complications:  The patient did not experience any complications.   Condition: Stable    Operative Report Completion:    Description of Operation/Procedure:    After appropriate informed consent was obtained, the patient was brought to the operating room. The appropriate cardiac and blood pressure monitors were placed. A final pause occurred just before the start of the procedure during which the entire procedure team actively confirmed the correct patient, procedure, site, special equipment and special requirements. The patient was prepped and draped in the usual sterile fashion using 5% povidone/iodine.     An eyelid speculum was placed to open the eyelids. A paracentesis port was placed approximately sixty degrees to the left of the planned temporal incision location using the sideport blade. Approximately 0.8 cc of 1% nonpreserved lidocaine was placed into the anterior chamber. The anterior chamber was filled with dispersive viscoelastic. A clear corneal temporal incision was made with a metal 2.6 mm keratome. A round continuous tear capsulorhexis was initiated with a cystotome and completed with the Utrata forceps. Balanced salt solution on a cannula was used to perform hydrodissection. The nucleus was removed using phacoemulsification with a chop technique. The remaining cortical material was  removed using the irrigation/aspiration handpiece. The capsular bag was filled with dispersive viscoelastic. A lens of the  model and power listed above was placed into the capsular bag using the cartridge injection system. The remaining viscoelastic was removed using the irrigation aspiration handpiece. The paracentesis and temporal wounds were hydrated with balanced salt solution. Intracameral moxifloxacin was administered. At the conclusion of the case, the wounds were felt to be watertight, the pupil was round, the lens was centered, and the anterior chamber was deep. A few drops of antibiotic and prednisolone were given to the operative eye. The eyelid speculum was removed. A shield was placed over the operative eye.    Attending Attestation:  I was present for and performed the entire procedure.  Ale Herndon MD

## 2019-02-18 ENCOUNTER — OFFICE VISIT (OUTPATIENT)
Dept: OPHTHALMOLOGY | Facility: CLINIC | Age: 74
End: 2019-02-18
Attending: OPHTHALMOLOGY
Payer: COMMERCIAL

## 2019-02-18 DIAGNOSIS — H04.123 DRY EYES, BILATERAL: ICD-10-CM

## 2019-02-18 DIAGNOSIS — Z96.1 PSEUDOPHAKIA, BOTH EYES: Primary | ICD-10-CM

## 2019-02-18 PROCEDURE — G0463 HOSPITAL OUTPT CLINIC VISIT: HCPCS | Mod: ZF

## 2019-02-18 PROCEDURE — 92015 DETERMINE REFRACTIVE STATE: CPT | Mod: ZF

## 2019-02-18 ASSESSMENT — VISUAL ACUITY
METHOD: SNELLEN - LINEAR
OS_SC: 20/300
OD_PH_SC: 20/25
OS_PH_SC: 20/40
OS_PH_SC+: -2
OD_SC: 20/40

## 2019-02-18 ASSESSMENT — CONF VISUAL FIELD
OS_NORMAL: 1
METHOD: COUNTING FINGERS
OD_NORMAL: 1

## 2019-02-18 ASSESSMENT — TONOMETRY
OD_IOP_MMHG: 19
IOP_METHOD: TONOPEN
OS_IOP_MMHG: 14

## 2019-02-18 ASSESSMENT — CUP TO DISC RATIO
OS_RATIO: 0.2
OD_RATIO: 0.2

## 2019-02-18 ASSESSMENT — REFRACTION_MANIFEST
OS_CYLINDER: +1.75
OS_AXIS: 132
OS_SPHERE: -4.75
OD_CYLINDER: +1.25
OS_ADD: +2.25
OD_ADD: +2.25
OD_AXIS: 085
OD_SPHERE: -2.50

## 2019-02-18 ASSESSMENT — SLIT LAMP EXAM - LIDS
COMMENTS: NORMAL
COMMENTS: NORMAL

## 2019-02-18 ASSESSMENT — EXTERNAL EXAM - RIGHT EYE: OD_EXAM: NORMAL

## 2019-02-18 NOTE — PROGRESS NOTES
HPI:  Filemon Modi is 73 year old male here for follow-up after CE/IOL both eyes. He is overall pleased with the vision, right eye is especially.     History of retinal detachment repair both eyes with SBP + small gas bubble.  OS VA worse than OD all life, unsure if ever correctable to 20/20, large anisometropia.      PAST OCULAR SURGERY  SBP OS 2006 ([facility])  SBP OD 2011 ([facility])    RETINAL IMAGING:  OCT 12/9/2019  od - stable erm   Os - stable erm       ASSESSMENT & PLAN  (Z96.1) Pseudophakia, both eyes  (primary encounter diagnosis)  (H04.123) Dry eyes, bilateral  Comment: Good post-op appearance, myopic as targeted, but is having binocular vertical diplopia/shadowing with refraction - ? Secondary to ocular surface irregularity both eyes vs ERM vs SB.  Plan: Complete pred taper. Start ATs 3x daily both eyes.   Orthoptic exam today  Recheck in 3 weeks after increased lubrication with ocular surface check and refraction - if diplopia with refraction persists, will prescribe small prism in glasses based on orthoptic exam (2BD right eye, 1 BI)    (H35.373) Epiretinal membrane (ERM) of both eyes  Comment: Stable  Plan: Monitor    (Z86.69) History of retinal detachment - Both Eyes  Comment: History of RD repair both eyes with SB and gas bubble (2006 left eye and 2011 right eye), stable today with retina flat  Plan: Observe      Return 3 weeks for ocular surface check and refraction.      Teaching statement:  Complete documentation of historical and exam elements from today's encounter can be found in the full encounter summary report (not reduplicated in this progress note). I personally obtained the chief complaint(s) and history of present illness.  I confirmed and edited as necessary the review of systems, past medical/surgical history, family history, social history, and examination findings as documented by others; and I examined the patient myself. I personally reviewed the relevant tests, images, and reports as  documented above.     I formulated and edited as necessary the assessment and plan and discussed the findings and management plan with the patient and family.    Ale Herndon MD  Comprehensive Ophthalmology & Ocular Pathology  Department of Ophthalmology and Visual Neurosciences  [email or web address]  Pager [phone]

## 2019-03-11 ENCOUNTER — OFFICE VISIT (OUTPATIENT)
Dept: OPHTHALMOLOGY | Facility: CLINIC | Age: 74
End: 2019-03-11
Attending: OPHTHALMOLOGY
Payer: COMMERCIAL

## 2019-03-11 DIAGNOSIS — Z96.1 PSEUDOPHAKIA, BOTH EYES: Primary | ICD-10-CM

## 2019-03-11 PROCEDURE — 92015 DETERMINE REFRACTIVE STATE: CPT | Mod: ZF | Performed by: TECHNICIAN/TECHNOLOGIST

## 2019-03-11 PROCEDURE — G0463 HOSPITAL OUTPT CLINIC VISIT: HCPCS | Mod: ZF | Performed by: TECHNICIAN/TECHNOLOGIST

## 2019-03-11 ASSESSMENT — TONOMETRY
OS_IOP_MMHG: 13
OD_IOP_MMHG: 14
IOP_METHOD: ICARE

## 2019-03-11 ASSESSMENT — REFRACTION_WEARINGRX
OS_SPHERE: -3.00
OS_CYLINDER: SPHERE
OD_CYLINDER: SPHERE
OD_SPHERE: -3.00

## 2019-03-11 ASSESSMENT — VISUAL ACUITY
OD_SC: 20/50
OD_SC+: -2
OS_SC: 20/125
OS_CC: 20/30
METHOD: SNELLEN - LINEAR
OD_CC: 20/40
OS_CC+: -2

## 2019-03-11 ASSESSMENT — SLIT LAMP EXAM - LIDS
COMMENTS: NORMAL
COMMENTS: NORMAL

## 2019-03-11 ASSESSMENT — REFRACTION_MANIFEST
OD_AXIS: 105
OS_SPHERE: -4.50
OD_CYLINDER: +0.75
OS_CYLINDER: +1.75
OS_ADD: +2.50
OS_AXIS: 130
OD_SPHERE: -2.50
OD_ADD: +2.50

## 2019-03-11 ASSESSMENT — CUP TO DISC RATIO
OS_RATIO: 0.2
OD_RATIO: 0.2

## 2019-03-11 ASSESSMENT — EXTERNAL EXAM - RIGHT EYE: OD_EXAM: NORMAL

## 2019-03-11 NOTE — PROGRESS NOTES
HPI:  Filemon Modi is 73 year old male here for follow-up after CE/IOL both eyes. He is overall pleased with the vision, right eye is especially.     History of retinal detachment repair both eyes with SBP + small gas bubble.  OS VA worse than OD all life, unsure if ever correctable to 20/20, large anisometropia.      PAST OCULAR SURGERY  SBP OS 2006 ([facility])  SBP OD 2011 ([facility])    RETINAL IMAGING:  OCT 12/9/2019  od - stable erm   Os - stable erm       ASSESSMENT & PLAN  (Z96.1) Pseudophakia, both eyes  (primary encounter diagnosis)  (H04.123) Dry eyes, bilateral  Comment: Good post-op appearance, myopic as targeted, but is having binocular vertical diplopia/shadowing with refraction. No prism needed on repeat orthoptic exam today. Monocular diplopia still left eye even after lubrication and good ocular surface appearance. - ? ocular surface irregularity both eyes vs ERM vs SB.  Plan: Improves with refraction. Given updated glasses Rx, no prism    (H35.373) Epiretinal membrane (ERM) of both eyes  Comment: Stable  Plan: Monitor    (Z86.69) History of retinal detachment - Both Eyes  Comment: History of RD repair both eyes with SB and gas bubble (2006 left eye and 2011 right eye), stable today with retina flat  Plan: Observe      Return to retina clinic for continued follow-up. General clinic as needed.      Teaching statement:  Complete documentation of historical and exam elements from today's encounter can be found in the full encounter summary report (not reduplicated in this progress note). I personally obtained the chief complaint(s) and history of present illness.  I confirmed and edited as necessary the review of systems, past medical/surgical history, family history, social history, and examination findings as documented by others; and I examined the patient myself. I personally reviewed the relevant tests, images, and reports as documented above.     I formulated and edited as necessary the assessment and plan  and discussed the findings and management plan with the patient and family.    Ale Herndon MD  Comprehensive Ophthalmology & Ocular Pathology  Department of Ophthalmology and Visual Neurosciences  [email or web address]  Pager [phone]

## 2019-03-11 NOTE — NURSING NOTE
"Chief Complaint(s) and History of Present Illness(es)     Post Op (Ophthalmology) Both Eyes     In both eyes (Left cataract surgery (1-18-19) and Right (1-25-19). ).  Associated symptoms include double vision.              Comments     3 weeks follow up for refraction and ocular surface recheck.   Patient states double vision/ \"overlapping\" stable since last visit. Patient states it could have possibly been there prior cataract surgery. He has been wearing his brother's glasses (-3.00s) per patient and at least 90% functionality per patient. No double or \"overlapping\" with his brother's glasses.     Patient states vision is \"deteriorating\" since cataract surgery but definitely better than before surgery.     Hiral Gee CO 3/11/2019 9:09 AM                "

## 2019-11-03 ENCOUNTER — HEALTH MAINTENANCE LETTER (OUTPATIENT)
Age: 74
End: 2019-11-03

## 2020-02-10 ENCOUNTER — HEALTH MAINTENANCE LETTER (OUTPATIENT)
Age: 75
End: 2020-02-10

## 2020-11-16 ENCOUNTER — HEALTH MAINTENANCE LETTER (OUTPATIENT)
Age: 75
End: 2020-11-16

## 2020-12-09 ENCOUNTER — TRANSFERRED RECORDS (OUTPATIENT)
Dept: HEALTH INFORMATION MANAGEMENT | Facility: CLINIC | Age: 75
End: 2020-12-09

## 2020-12-16 ENCOUNTER — TELEPHONE (OUTPATIENT)
Dept: UROLOGY | Facility: CLINIC | Age: 75
End: 2020-12-16

## 2020-12-16 NOTE — TELEPHONE ENCOUNTER
[facility] Call Center    Phone Message    May a detailed message be left on voicemail: yes     Reason for Call: Other: Filemon called to set up his first appointment with a urologist regarding his PSA of 14.9. He would like to see Dr. Escobedo if possible, but is open to other providers. His records are at [facility]. He reports that he does not have any video capability so he would prefer in clinic sometime in January or Feb. Please call Filemon to discuss options.      Action Taken: Other:  Urology    Travel Screening: Not Applicable

## 2020-12-21 NOTE — TELEPHONE ENCOUNTER
Blood in semen 3 or 4 times  psa 14.9  No uti  At his PMD  Wants dr sidhu,  Needs appointments Robyn Cao LPN Staff Nurse  alex

## 2020-12-21 NOTE — TELEPHONE ENCOUNTER
[facility] Call Center    Phone Message    May a detailed message be left on voicemail: yes     Reason for Call: Other: Filemon would appreciated a call back regarding scheduling an appointment with Dr. Escobedo.     Action Taken: Other:  Urology    Travel Screening: Not Applicable

## 2020-12-21 NOTE — TELEPHONE ENCOUNTER
Sent to [facility] nurse shaw to schedule a sooner aPPT  HE will go with any md's Robyn Cao LPN Staff Nurse

## 2021-01-05 NOTE — TELEPHONE ENCOUNTER
Pt called for update on call back to establish care with Dr. Escobedo. Pt has two separate Dx. One for elevated PSA and the other for blood in ejaculate. Per guidelines, Dr. Escobedo does not see for blood in ejaculate. Pt requesting if Dr. Escobedo would be able to see him for both issues if at all possible without having separate providers for the separate issues. Pt did agree that if Dr. Escobedo declined to establish care for both issues, that the clinic recommend any provider who can. Please contact Pt to discuss further

## 2021-01-06 NOTE — TELEPHONE ENCOUNTER
MEDICAL RECORDS REQUEST   [facility]  Urology Clinic  [address] [place] [address]  PHONE: [phone]  Fax: [phone]        FUTURE VISIT INFORMATION                                                   Filemon Modi, : 1945 scheduled for future visit at [facility]    APPOINTMENT INFORMATION:    Date: 2/15/2021 10AM    Provider:  Fanny WATSON    Reason for Visit/Diagnosis: PSA    REFERRAL INFORMATION:    Referring provider:  N/A    Specialty: N/A    Referring providers clinic:      Clinic contact number:  N/A    RECORDS REQUESTED FOR VISIT                                                     NOTES  STATUS/DETAILS   OFFICE NOTE from referring provider  yes   OFFICE NOTE from other specialist  no   DISCHARGE SUMMARY from hospital  no   DISCHARGE REPORT from the ER  no   OPERATIVE REPORT  no   MEDICATION LIST  no     PRE-VISIT CHECKLIST      Record collection complete Yes - PSA will send labs too me   Lab results 14.9 (recs scanned in epic)    Appointment appropriately scheduled           (right time/right provider) Yes   MyChart activation Yes   Questionnaire complete If no, please explain: In process      Completed by: Ale Love

## 2021-02-04 ENCOUNTER — TELEPHONE (OUTPATIENT)
Dept: UROLOGY | Facility: CLINIC | Age: 76
End: 2021-02-04

## 2021-02-04 NOTE — TELEPHONE ENCOUNTER
Spoke to patient to change appt on 2/15 with Dr Escobedo to virtual. Per patient, he don't have access to video. He can only do telephone.

## 2021-02-15 ENCOUNTER — PRE VISIT (OUTPATIENT)
Dept: UROLOGY | Facility: CLINIC | Age: 76
End: 2021-02-15

## 2021-02-15 NOTE — TELEPHONE ENCOUNTER
Visit Type : Elevated PSA     Records/Orders: PSA in epic     Pt Contacted: no    At Rooming: normal

## 2021-02-22 ENCOUNTER — OFFICE VISIT (OUTPATIENT)
Dept: UROLOGY | Facility: CLINIC | Age: 76
End: 2021-02-22
Payer: COMMERCIAL

## 2021-02-22 VITALS
SYSTOLIC BLOOD PRESSURE: 150 MMHG | BODY MASS INDEX: 23.62 KG/M2 | DIASTOLIC BLOOD PRESSURE: 88 MMHG | WEIGHT: 190 LBS | HEIGHT: 75 IN | HEART RATE: 62 BPM

## 2021-02-22 DIAGNOSIS — N41.9 PROSTATITIS, UNSPECIFIED PROSTATITIS TYPE: Primary | ICD-10-CM

## 2021-02-22 DIAGNOSIS — R97.20 ELEVATED PROSTATE SPECIFIC ANTIGEN (PSA): ICD-10-CM

## 2021-02-22 PROCEDURE — 99203 OFFICE O/P NEW LOW 30 MIN: CPT | Performed by: UROLOGY

## 2021-02-22 PROCEDURE — 51798 US URINE CAPACITY MEASURE: CPT | Performed by: UROLOGY

## 2021-02-22 RX ORDER — DOXYCYCLINE HYCLATE 100 MG
100 TABLET ORAL 2 TIMES DAILY
Qty: 60 TABLET | Refills: 0 | Status: SHIPPED | OUTPATIENT
Start: 2021-02-22 | End: 2023-03-24

## 2021-02-22 RX ORDER — TAMSULOSIN HYDROCHLORIDE 0.4 MG/1
0.4 CAPSULE ORAL EVERY EVENING
Status: ON HOLD | COMMUNITY
Start: 2020-12-10 | End: 2023-04-07

## 2021-02-22 ASSESSMENT — MIFFLIN-ST. JEOR: SCORE: 1687.46

## 2021-02-22 ASSESSMENT — PAIN SCALES - GENERAL: PAINLEVEL: NO PAIN (0)

## 2021-02-22 NOTE — PATIENT INSTRUCTIONS
Please get PSA in April and follow up with  after for a virtual appointment     It was a pleasure meeting with you today.  Thank you for allowing me and my team the privilege of caring for you today.  YOU are the reason we are here, and I truly hope we provided you with the excellent service you deserve.  Please let us know if there is anything else we can do for you so that we can be sure you are leaving completely satisfied with your care experience.

## 2021-02-22 NOTE — LETTER
2/22/2021       RE: Filemon Modi  [address] [facility] [address]     Dear Colleague,    Thank you for referring your patient, Filemon Modi, to the [facility] UROLOGY CLINIC [place] at [facility]. Please see a copy of my visit note below.    CC: Elevated PSA    HPI: 74 yo male with hematospermia just before Thanksgiving.  During work-up, found to have elevated PSA to 14.9 ng/mL on 12/9/20.  Has history of longstanding abnormal NASIR.         PMHx: kidney stones  PShx: hernia repair x2, cataracts, right hemicolectomy (lap), appy, ureteroscopy, ESWL  MEDS: flomax, prednisolone, sunthroid, Omega 3 fatty acids, Vit D  NKDA  FHx: neg CaP  SocHx: neg Tobac, wine with dinner  ROS: neg for f/c/s, n/v, wt changes        OBJECTIVE:  PSA 12/9/20 is 14.9 ng/mL    Abd soft, NT, no flank tend  circ phallus, testes descended bilat, no hernias  NASRI: ridge along right side, small nodule left mid/apex    VOID/PVR: 50/25 cc    ASSESSMENT AND PLAN:   Over half of today's 35-minute visit was spent counseling the patient regarding his elevated PSA.  Patient with prostatitis based on hematospermia.  May account for elevated PSA.  Will treat with doxycycline 100 mg po bid for 30 days then repeat PSA.  If still elevated, will consider MRI and possible biopsy.      Again, thank you for allowing me to participate in the care of your patient.      Sincerely,    Royal Escobedo MD

## 2021-02-22 NOTE — PROGRESS NOTES
CC: Elevated PSA    HPI: 76 yo male with hematospermia just before Thanksgiving.  During work-up, found to have elevated PSA to 14.9 ng/mL on 12/9/20.  Has history of longstanding abnormal NASIR.         PMHx: kidney stones  PShx: hernia repair x2, cataracts, right hemicolectomy (lap), appy, ureteroscopy, ESWL  MEDS: flomax, prednisolone, sunthroid, Omega 3 fatty acids, Vit D  NKDA  FHx: neg CaP  SocHx: neg Tobac, wine with dinner  ROS: neg for f/c/s, n/v, wt changes        OBJECTIVE:  PSA 12/9/20 is 14.9 ng/mL    Abd soft, NT, no flank tend  circ phallus, testes descended bilat, no hernias  NASIR: ridge along right side, small nodule left mid/apex    VOID/PVR: 50/25 cc    ASSESSMENT AND PLAN:   Over half of today's 35-minute visit was spent counseling the patient regarding his elevated PSA.  Patient with prostatitis based on hematospermia.  May account for elevated PSA.  Will treat with doxycycline 100 mg po bid for 30 days then repeat PSA.  If still elevated, will consider MRI and possible biopsy.

## 2021-04-03 ENCOUNTER — HEALTH MAINTENANCE LETTER (OUTPATIENT)
Age: 76
End: 2021-04-03

## 2021-04-14 ENCOUNTER — PRE VISIT (OUTPATIENT)
Dept: UROLOGY | Facility: CLINIC | Age: 76
End: 2021-04-14

## 2021-04-14 NOTE — TELEPHONE ENCOUNTER
Visit Type : PSA check     Records/Orders: PSA is on 4/21    Pt Contacted: no    At Rooming:phone

## 2021-04-21 DIAGNOSIS — R97.20 ELEVATED PROSTATE SPECIFIC ANTIGEN (PSA): ICD-10-CM

## 2021-04-21 LAB — PSA SERPL-MCNC: 14.5 UG/L (ref 0–4)

## 2021-04-21 PROCEDURE — 36415 COLL VENOUS BLD VENIPUNCTURE: CPT | Performed by: PATHOLOGY

## 2021-04-21 PROCEDURE — 84153 ASSAY OF PSA TOTAL: CPT | Performed by: PATHOLOGY

## 2021-04-26 ENCOUNTER — VIRTUAL VISIT (OUTPATIENT)
Dept: UROLOGY | Facility: CLINIC | Age: 76
End: 2021-04-26
Payer: COMMERCIAL

## 2021-04-26 DIAGNOSIS — R97.20 ELEVATED PROSTATE SPECIFIC ANTIGEN (PSA): Primary | ICD-10-CM

## 2021-04-26 PROCEDURE — 99213 OFFICE O/P EST LOW 20 MIN: CPT | Performed by: UROLOGY

## 2021-04-26 NOTE — NURSING NOTE
Chief Complaint   Patient presents with     Follow Up     PSA       No Known Allergies    Current Outpatient Medications   Medication Sig Dispense Refill     Cholecalciferol (VITAMIN D PO) Take 1,000 mg by mouth every morning        Levothyroxine Sodium (LEVOTHROID PO) Take 125 mcg by mouth every morning        tamsulosin (FLOMAX) 0.4 MG capsule        doxycycline hyclate (VIBRA-TABS) 100 MG tablet Take 1 tablet (100 mg) by mouth 2 times daily 60 tablet 0     Omega-3 Fatty Acids ([employer] FISH OIL) 1200 MG CAPS Take 1 capsule by mouth every morning        prednisoLONE acetate (PRED FORTE) 1 % ophthalmic suspension 1 drop in surgical eye as directed, 4x daily after surgery for 1 week, 3x daily for 1 week, 2x daily for 1 week, daily for 1 week, then stop 1 Bottle 1       Social History     Tobacco Use     Smoking status: Never Smoker     Smokeless tobacco: Never Used   Substance Use Topics     Alcohol use: Yes     Alcohol/week: 7.0 standard drinks     Types: 7 Glasses of wine per week     Comment: wine daily     Drug use: Yes     Types: Marijuana       Michelle Garcia LPN  4/26/2021  2:41 PM

## 2021-04-26 NOTE — LETTER
4/26/2021       RE: Filemon Modi  [address] [facility] [address]     Dear Colleague,    Thank you for referring your patient, Filemon Modi, to the [facility] UROLOGY CLINIC [place] at [facility]. Please see a copy of my visit note below.    Filemon is a 76 year old who is being evaluated via a billable telephone visit.      What phone number would you like to be contacted at? [phone]  How would you like to obtain your AVS? MyChart     HPI: 77 yo male with hematospermia just before Thanksgiving.  During work-up, found to have elevated PSA to 14.9 ng/mL on 12/9/20.  Has history of longstanding abnormal NASIR.  Had what sounded like prostatitis symptoms and took antibiotics (doxy) for a month with a repeat PSA.  Things are better symptomatically with flomax and hematospermia is gone.    OBJECTIVE:  PSA from 4/21/21 is 14.5 ng/mL      ASSESSMENT AND PLAN:   Over half of today's 19-minute visit was spent reviewing the chart, results and counseling the patient regarding his PSA.  PSA remains elevated.  Will move forward with a prostate MRI and biopsy.      Phone call duration: 12 minutes      Sincerely,    Royal Escobedo MD

## 2021-04-26 NOTE — PROGRESS NOTES
Filemon is a 76 year old who is being evaluated via a billable telephone visit.      What phone number would you like to be contacted at? [phone]  How would you like to obtain your AVS? Maximiliano     HPI: 75 yo male with hematospermia just before Thanksgiving.  During work-up, found to have elevated PSA to 14.9 ng/mL on 12/9/20.  Has history of longstanding abnormal NASIR.  Had what sounded like prostatitis symptoms and took antibiotics (doxy) for a month with a repeat PSA.  Things are better symptomatically with flomax and hematospermia is gone.    OBJECTIVE:  PSA from 4/21/21 is 14.5 ng/mL      ASSESSMENT AND PLAN:   Over half of today's 19-minute visit was spent reviewing the chart, results and counseling the patient regarding his PSA.  PSA remains elevated.  Will move forward with a prostate MRI and biopsy.      Phone call duration: 12 minutes

## 2021-04-27 NOTE — PATIENT INSTRUCTIONS
Schedule prostate MRI and follow up with Dr. Escobedo for Fusion Prostate Biopsy.      It was a pleasure meeting with you today.  Thank you for allowing me and my team the privilege of caring for you today.  YOU are the reason we are here, and I truly hope we provided you with the excellent service you deserve.  Please let us know if there is anything else we can do for you so that we can be sure you are leaving completely satisfied with your care experience.

## 2021-04-29 ENCOUNTER — TELEPHONE (OUTPATIENT)
Dept: UROLOGY | Facility: CLINIC | Age: 76
End: 2021-04-29

## 2021-04-29 NOTE — TELEPHONE ENCOUNTER
M for patient to schedule a virtual visit follow up for biopsy results on 7-12 at 4:30 with anatoly Wesley per Cara.

## 2021-05-25 ENCOUNTER — OFFICE VISIT (OUTPATIENT)
Dept: OPHTHALMOLOGY | Facility: CLINIC | Age: 76
End: 2021-05-25
Attending: OPHTHALMOLOGY
Payer: COMMERCIAL

## 2021-05-25 DIAGNOSIS — H43.812 VITREOUS DEGENERATION AND DETACHMENT OF LEFT EYE: ICD-10-CM

## 2021-05-25 DIAGNOSIS — H43.811 VITREORETINAL DEGENERATION OF RIGHT EYE: ICD-10-CM

## 2021-05-25 DIAGNOSIS — H35.373 EPIRETINAL MEMBRANE (ERM) OF BOTH EYES: ICD-10-CM

## 2021-05-25 DIAGNOSIS — H52.7 REFRACTIVE ERROR: Primary | ICD-10-CM

## 2021-05-25 PROCEDURE — G0463 HOSPITAL OUTPT CLINIC VISIT: HCPCS

## 2021-05-25 PROCEDURE — 92134 CPTRZ OPH DX IMG PST SGM RTA: CPT | Performed by: OPHTHALMOLOGY

## 2021-05-25 PROCEDURE — 92015 DETERMINE REFRACTIVE STATE: CPT | Performed by: OPHTHALMOLOGY

## 2021-05-25 PROCEDURE — 92014 COMPRE OPH EXAM EST PT 1/>: CPT | Mod: GC | Performed by: OPHTHALMOLOGY

## 2021-05-25 ASSESSMENT — EXTERNAL EXAM - LEFT EYE: OS_EXAM: NORMAL

## 2021-05-25 ASSESSMENT — REFRACTION_WEARINGRX
OD_SPHERE: -2.50
OS_SPHERE: -4.50
OS_ADD: +2.50
OD_AXIS: 104
OD_CYLINDER: +0.75
OS_CYLINDER: +1.75
OS_AXIS: 153
OD_ADD: +2.50

## 2021-05-25 ASSESSMENT — REFRACTION_MANIFEST
OD_ADD: +2.50
OD_CYLINDER: +0.50
OS_CYLINDER: +1.75
OD_AXIS: 085
OS_AXIS: 125
OS_SPHERE: -4.75
OD_SPHERE: -2.25
OS_ADD: +2.50

## 2021-05-25 ASSESSMENT — CONF VISUAL FIELD
METHOD: COUNTING FINGERS
OD_NORMAL: 1
OS_NORMAL: 1

## 2021-05-25 ASSESSMENT — VISUAL ACUITY
OS_CC: 20/40
OD_CC: 20/20
CORRECTION_TYPE: GLASSES
METHOD: SNELLEN - LINEAR
OS_PH_CC+: -2
OS_PH_CC: 20/25

## 2021-05-25 ASSESSMENT — TONOMETRY
IOP_METHOD: TONOPEN
OS_IOP_MMHG: 14
OD_IOP_MMHG: 12

## 2021-05-25 ASSESSMENT — EXTERNAL EXAM - RIGHT EYE: OD_EXAM: NORMAL

## 2021-05-25 ASSESSMENT — SLIT LAMP EXAM - LIDS
COMMENTS: NORMAL
COMMENTS: NORMAL

## 2021-05-25 ASSESSMENT — CUP TO DISC RATIO
OS_RATIO: 0.2
OD_RATIO: 0.3

## 2021-05-25 NOTE — NURSING NOTE
Chief Complaints and History of Present Illnesses   Patient presents with     Epiretinal Membrane Evaluation     Both eyes       Chief Complaint(s) and History of Present Illness(es)     Epiretinal Membrane Evaluation     Comments: Both eyes                Comments     Pt states vision has been great in both eyes. Glasses are 2 years old.  No flashes or floaters. Some redness  in both eyes, comes and goes.   Pt using Lumify drops in both eyes every few days for redness.    [facility] May 25, 2021 7:50 AM

## 2021-05-25 NOTE — PROGRESS NOTES
CC -   H/o RD OU    INTERVAL HISTORY - Last visit with Dr. Lipscomb in 2017. Patient reports stable vision.     PMH -   Filemon Modi is a  76 year old year-old patient with history of retinal detachment repair both eyes with SBP + small gas bubble.  OS VA worse than OD all life, unsure if ever correctable to 20/20, large anisometropia.      PAST OCULAR SURGERY  SBP OS 2006 ([facility])  SBP OD 2011 ([facility])  CE/IOL OU  2019 ([facility])      RETINAL IMAGING:  OCT  5-25-21  OD - tr ERM, PVD, stable  OS - tr ERM, PVD, stable      ASSESSMENT & PLAN    #. H/o RD repair OU with SBP + gas bubble   - 2006 OS & 2011 OD   - retina flat     # tr . ERM OU   - trace on OCT   - NVS   - Good vision acuity. Continue to monitor    #.  PVD OU   - S/Sx RD d/w patient 5/2021    #. Pseudophakia each eye    - Cataract surgery in 2019   - doing well. VAcc 20/20, 20/25    #  Anisometropia   - per patient WRx doesn't incorporate full OS correction      return to clinic: 1 year, OCT MAC, VTD    Nina Castillo MD  Ophthalmology PGY-3      ATTESTATION     Attending Attestation:     Complete documentation of historical and exam elements from today's encounter can be found in the full encounter summary report (not reduplicated in this progress note).  I personally obtained the chief complaint(s) and history of present illness.  I confirmed and edited as necessary the review of systems, past medical/surgical history, family history, social history, and examination findings as documented by others; and I examined the patient myself.  I personally reviewed the relevant tests, images, and reports as documented above.  I personally reviewed the ophthalmic test(s) associated with this encounter, agree with the interpretation(s) as documented by the resident/fellow, and have edited the corresponding report(s) as necessary.   I formulated and edited as necessary the assessment and plan and discussed the findings and management plan with the patient and family    Nicolette  MD Deisi, PhD  , Vitreoretinal Surgery  Department of Ophthalmology  [facility]

## 2021-05-28 ENCOUNTER — RECORDS - HEALTHEAST (OUTPATIENT)
Dept: ADMINISTRATIVE | Facility: CLINIC | Age: 76
End: 2021-05-28

## 2021-06-15 ENCOUNTER — ANCILLARY PROCEDURE (OUTPATIENT)
Dept: MRI IMAGING | Facility: CLINIC | Age: 76
End: 2021-06-15
Attending: UROLOGY
Payer: COMMERCIAL

## 2021-06-15 DIAGNOSIS — R97.20 ELEVATED PROSTATE SPECIFIC ANTIGEN (PSA): ICD-10-CM

## 2021-06-15 PROCEDURE — 72197 MRI PELVIS W/O & W/DYE: CPT | Performed by: RADIOLOGY

## 2021-06-15 PROCEDURE — 76377 3D RENDER W/INTRP POSTPROCES: CPT | Performed by: RADIOLOGY

## 2021-06-15 PROCEDURE — A9585 GADOBUTROL INJECTION: HCPCS | Performed by: RADIOLOGY

## 2021-06-15 RX ORDER — GADOBUTROL 604.72 MG/ML
10 INJECTION INTRAVENOUS ONCE
Status: COMPLETED | OUTPATIENT
Start: 2021-06-15 | End: 2021-06-15

## 2021-06-15 RX ADMIN — GADOBUTROL 9 ML: 604.72 INJECTION INTRAVENOUS at 07:11

## 2021-06-15 NOTE — DISCHARGE INSTRUCTIONS
MRI Contrast Discharge Instructions    The IV contrast you received today will pass out of your body in your  urine. This will happen in the next 24 hours. You will not feel this process.  Your urine will not change color.    Drink at least 4 extra glasses of water or juice today (unless your doctor  has restricted your fluids). This reduces the stress on your kidneys.  You may take your regular medicines.    If you are on dialysis: It is best to have dialysis today.    If you have a reaction: Most reactions happen right away. If you have  any new symptoms after leaving the hospital (such as hives or swelling),  call your hospital at the correct number below. Or call your family doctor.  If you have breathing distress or wheezing, call 911.    Special instructions: ***    I have read and understand the above information.    Signature:______________________________________ Date:___________    Staff:__________________________________________ Date:___________     Time:__________    [facility] Departments:    ___[place]: [phone]  ___[facility]: [phone]  ___[place]: [phone] ___[facility]: [phone]  ___[facility]: [phone]  ___[facility]: [phone]  ___[phone]  ___[facility]: [phone]  ___[phone]

## 2021-06-28 ENCOUNTER — PRE VISIT (OUTPATIENT)
Dept: UROLOGY | Facility: CLINIC | Age: 76
End: 2021-06-28

## 2021-06-28 NOTE — TELEPHONE ENCOUNTER
Patient was communicated prep for biopsy including:      No blood thinning medications for 7 days before procedure, including aspirin, anticoagulants, ibuprofen and fish oil.       prophylactic antibiotics sent to primary pharmacy listed in chart:   -take the day before, the day of and the day after the procedure, one tablet, two times daily.      Complete a Fleets enema approximately 2 hours before the scheduled procedure.      Do not come to the appointment fasted.   EXERTIONAL DYSPNEA/SHORTNESS OF BREATH/COUGH

## 2021-07-05 ENCOUNTER — OFFICE VISIT (OUTPATIENT)
Dept: UROLOGY | Facility: CLINIC | Age: 76
End: 2021-07-05
Payer: COMMERCIAL

## 2021-07-05 VITALS
BODY MASS INDEX: 23.62 KG/M2 | SYSTOLIC BLOOD PRESSURE: 149 MMHG | WEIGHT: 190 LBS | DIASTOLIC BLOOD PRESSURE: 78 MMHG | HEART RATE: 68 BPM | HEIGHT: 75 IN

## 2021-07-05 DIAGNOSIS — R97.20 ELEVATED PROSTATE SPECIFIC ANTIGEN (PSA): Primary | ICD-10-CM

## 2021-07-05 PROCEDURE — 55700 PR BIOPSY OF PROSTATE,NEEDLE/PUNCH: CPT | Performed by: UROLOGY

## 2021-07-05 PROCEDURE — 88305 TISSUE EXAM BY PATHOLOGIST: CPT | Performed by: PATHOLOGY

## 2021-07-05 PROCEDURE — 76999 ECHO EXAMINATION PROCEDURE: CPT | Performed by: UROLOGY

## 2021-07-05 PROCEDURE — 76942 ECHO GUIDE FOR BIOPSY: CPT | Performed by: UROLOGY

## 2021-07-05 RX ORDER — GENTAMICIN 40 MG/ML
80 INJECTION, SOLUTION INTRAMUSCULAR; INTRAVENOUS ONCE
Status: COMPLETED | OUTPATIENT
Start: 2021-07-05 | End: 2021-07-05

## 2021-07-05 RX ADMIN — GENTAMICIN 80 MG: 40 INJECTION, SOLUTION INTRAMUSCULAR; INTRAVENOUS at 10:58

## 2021-07-05 ASSESSMENT — MIFFLIN-ST. JEOR: SCORE: 1682.46

## 2021-07-05 ASSESSMENT — PAIN SCALES - GENERAL: PAINLEVEL: NO PAIN (0)

## 2021-07-05 NOTE — LETTER
7/5/2021       RE: Filemon Modi  [address] [facility] [address]     Dear Colleague,    Thank you for referring your patient, Filemon Modi, to the [facility] UROLOGY CLINIC [place] at [facility]. Please see a copy of my visit note below.    Reason for visit: MRI ultrasound fusion prostate biopsy    Indications: Mr. Modi is a 76-year-old gentleman followed in clinic for elevated PSA. He presents today for  MRI/ultrasound fusion prostate biopsy.    Procedure: After informed consent was obtained and after confirming the patient has been off aspirin or aspirin like products for a week, took his antibiotics and perform his enema, the patient was placed left side down on the procedure table. Digital rectal exam was performed revealing a normal feeling prostate. The ultrasound probe was lubricated and placed into the patient's rectum without difficulty. Inspection of the prostate revealed a few calcifications, but no obvious hypoechoic regions.   Next 5 ccs of lidocaine were injected at the junction of the prostate and the seminal vesicles bilaterally.  Measurement of the prostate were then obtained revealing a volume of 87ccs.  We then performed an ultrasound sweep of the prostate. These post-processed images were then utilized by the UroNav software to create a 3-D prostate volume.  These images were then overlaid on the patient's MRI from 6/15/21 and MRI/ ultrasound fusion was performed. We then obtained 2 cores from target #1, a lesion in the right mid TZ 10-12 oclock. We then obtained another 12 cores in the standard sextant distribution with an additional core each from the left and right transition zones for a total of 16 cores obtained. The patient tolerated the procedure without difficulty.    The patient was counseled he could expect to see blood in his urine, semen, and stool for the next several days and should contact us should he  develop any fevers chills or sweats. We'll see the patient back in a week to go over the results of his biopsy.       Royal Escobedo MD

## 2021-07-05 NOTE — PROGRESS NOTES
Reason for visit: MRI ultrasound fusion prostate biopsy    Indications: Mr. Modi is a 76-year-old gentleman followed in clinic for elevated PSA. He presents today for  MRI/ultrasound fusion prostate biopsy.    Procedure: After informed consent was obtained and after confirming the patient has been off aspirin or aspirin like products for a week, took his antibiotics and perform his enema, the patient was placed left side down on the procedure table. Digital rectal exam was performed revealing a normal feeling prostate. The ultrasound probe was lubricated and placed into the patient's rectum without difficulty. Inspection of the prostate revealed a few calcifications, but no obvious hypoechoic regions.   Next 5 ccs of lidocaine were injected at the junction of the prostate and the seminal vesicles bilaterally.  Measurement of the prostate were then obtained revealing a volume of 87ccs.  We then performed an ultrasound sweep of the prostate. These post-processed images were then utilized by the UroNav software to create a 3-D prostate volume.  These images were then overlaid on the patient's MRI from 6/15/21 and MRI/ ultrasound fusion was performed. We then obtained 2 cores from target #1, a lesion in the right mid TZ 10-12 oclock. We then obtained another 12 cores in the standard sextant distribution with an additional core each from the left and right transition zones for a total of 16 cores obtained. The patient tolerated the procedure without difficulty.    The patient was counseled he could expect to see blood in his urine, semen, and stool for the next several days and should contact us should he develop any fevers chills or sweats. We'll see the patient back in a week to go over the results of his biopsy.

## 2021-07-05 NOTE — NURSING NOTE
Verified with pt that he has been off aspirin products and blood thinners, started Cipro 500 mg BID x 3 days 7/4/21, and did a fleets enema this morning.    The following medication was given:     MEDICATION:  Gentamicin  ROUTE: IM  SITE: Ventrogluteal - Right  DOSE: 80 mg/ 2 mL  LOT #: [id number]  : [employer]  EXPIRATION DATE: 12/21  NDC#: 28998-550-54   Was there drug waste? No      Pt tolerated injection well.

## 2021-07-05 NOTE — PATIENT INSTRUCTIONS
[facility]  Precautions Following a Prostate Biopsy    There are four conditions that you should watch for after a prostate biopsy:    1. Excessive pain  2. Bleeding irregularities (passing numerous  dime sized  clots or if your urine looks like cranberry juice)  3. Fever of 100 degrees or more  4. If you are unable to urinate        If any of these occur, call the Urology Clinic during normal business hours (M-F, 8:00-4:30) at [phone].  If you experience a problem after normal business hours, call our 24-hour phone number at [phone] and ask for the Urology Resident on call to be paged.      If you experience any discomfort following the biopsy, you may take Tylenol.  DO NOT TAKE ASPIRIN unless specified by your physician.   If the discomfort becomes severe or uncontrolled by medication, contact the Urology Clinic or Urology Resident (after normal business hours).      Do not be alarmed if you have some blood in your stool, in your urine, or ejaculate (semen).  This occurrence is normal and may last up to three (3) or four (4) days, usually intermittently.  Blood in the ejaculate (semen) may last several weeks, up to about a dozen ejaculations.  The blood in your ejaculate may appear as brown streaks, blood tinged, and immediately following a biopsy, it may appear bright red.      If you run a fever above 100 degrees, call the Urology Clinic or Urology Resident (after normal business hours) immediately.  If you are unable to reach your physician or the Resident on call, go to the nearest emergency room.  Explain that you have had a transrectal biopsy of your prostate and what problems you are experiencing.        You should attempt to urinate following your biopsy before you leave the clinic.  If you are unable to urinate four (4) to six (6) hours after you leave the clinic, you will need to contact the Urology Clinic or the Resident on call.  If you are unable to reach  your physician or the Resident on call, go to the nearest emergency room.            If you have any questions or concerns after your biopsy, feel free to contact the Urology Clinic at [phone] during M-F, 8:00-5pm business hours.  If you need to speak with someone after normal business hours, call [phone] and ask for the Resident on call to be paged.

## 2021-07-05 NOTE — NURSING NOTE
"Chief Complaint   Patient presents with     Prostate Biopsy     Elevated PSA       Blood pressure (!) 149/78, pulse 68, height 1.905 m (6' 3\"), weight 86.2 kg (190 lb). Body mass index is 23.75 kg/m .    There is no problem list on file for this patient.      No Known Allergies    Current Outpatient Medications   Medication Sig Dispense Refill     Brimonidine Tartrate 0.025 % SOLN Apply 1 drop to eye as needed       Cholecalciferol (VITAMIN D PO) Take 1,000 mg by mouth every morning        ciprofloxacin (CIPRO) 500 MG tablet Take 1 tablet (500 mg) by mouth 2 times daily 6 tablet 0     doxycycline hyclate (VIBRA-TABS) 100 MG tablet Take 1 tablet (100 mg) by mouth 2 times daily 60 tablet 0     Levothyroxine Sodium (LEVOTHROID PO) Take 125 mcg by mouth every morning        tamsulosin (FLOMAX) 0.4 MG capsule          Social History     Tobacco Use     Smoking status: Never Smoker     Smokeless tobacco: Never Used   Substance Use Topics     Alcohol use: Yes     Alcohol/week: 7.0 standard drinks     Types: 7 Glasses of wine per week     Comment: wine daily     Drug use: Yes     Types: Marijuana       Invasive Procedure Safety Checklist:    Procedure: MRI fusion TRUS prostate biopsy    Action: Complete sections and checkboxes as appropriate.    Pre-procedure:  1. Patient ID Verified with 2 identifiers (Marly and  or MRN) : YES    2. Procedure and site verified with patient/designee (when able) : YES    3. Accurate consent documentation in medical record : YES    4. H&P (or appropriate assessment) documented in medical record : N/A  H&P must be up to 30 days prior to procedure an updated within 24 hours of                 Procedure as applicable.     5. Relevant diagnostic and radiology test results appropriately labeled and displayed as applicable : YES    6. Blood products, implants, devices, and/or special equipment available for the procedure as applicable : YES    7. Procedure site(s) marked with provider initials " [Exclusions: none] : NO    8. Marking not required. Reason : Yes  Procedure does not require site marking    Time Out:     Time-Out performed immediately prior to starting procedure, including verbal and active participation of all team members addressing: YES    1. Correct patient identity.  2. Confirmed that the correct side and site are marked.  3. An accurate procedure to be done.  4. Agreement on the procedure to be done.  5. Correct patient position.  6. Relevant images and results are properly labeled and appropriately displayed.  7. The need to administer antibiotics or fluids for irrigation purposes during the procedure as applicable.  8. Safety precautions based on patient history or medication use.    During Procedure: Verification of correct person, site, and procedure occurs any time the responsibility for care of the patient is transferred to another member of the care team.    The following medication was given:     MEDICATION:  Lidocaine without epinephrine 1%  ROUTE: administered by physician - prostate nerve block   SITE: administered by physician - prostate nerve block    DOSE: 10 mL  LOT #: [id number]  : [employer]  EXPIRATION DATE: 12/24  NDC#: 31250-670-20   Was there drug waste? Yes  Amount of drug waste (mL): 20 mL.  Reason for waste:  Single use vial    Prior to injection, verified patient identity using patient's name and date of birth.  Due to injection administration, patient instructed to remain in clinic for 15 minutes  afterwards, and to report any adverse reaction to me immediately.    Drug Amount Wasted:  Yes: 20 mL (10 mg/ml )  Vial/Syringe: Single dose vial    Yung Chavez  7/5/2021  10:28 AM

## 2021-07-06 ENCOUNTER — PRE VISIT (OUTPATIENT)
Dept: UROLOGY | Facility: CLINIC | Age: 76
End: 2021-07-06

## 2021-07-06 NOTE — TELEPHONE ENCOUNTER
Reason for visit: prostate biopsy results      Dx/Hx/Sx: elevated PSA    Records/imaging/labs/orders: all records available      At Rooming: telephone visit

## 2021-07-07 LAB — COPATH REPORT: NORMAL

## 2021-07-12 ENCOUNTER — VIRTUAL VISIT (OUTPATIENT)
Dept: UROLOGY | Facility: CLINIC | Age: 76
End: 2021-07-12
Payer: COMMERCIAL

## 2021-07-12 VITALS — HEIGHT: 75 IN | WEIGHT: 190 LBS | BODY MASS INDEX: 23.62 KG/M2

## 2021-07-12 DIAGNOSIS — C61 PROSTATE CANCER (H): Primary | ICD-10-CM

## 2021-07-12 PROCEDURE — 99214 OFFICE O/P EST MOD 30 MIN: CPT | Mod: 95 | Performed by: UROLOGY

## 2021-07-12 ASSESSMENT — MIFFLIN-ST. JEOR: SCORE: 1682.46

## 2021-07-12 ASSESSMENT — PAIN SCALES - GENERAL: PAINLEVEL: NO PAIN (0)

## 2021-07-12 NOTE — LETTER
7/12/2021       RE: Filemon Modi  [address] [facility] [address]     Dear Colleague,    Thank you for referring your patient, Filemon Modi, to the [facility] UROLOGY CLINIC [place] [facility]. Please see a copy of my visit note below.    Filemon is a 76 year old who is being evaluated via a billable telephone visit.      What phone number would you like to be contacted at? [phone]  How would you like to obtain your AVS? Carissahart     CC: prostate biopsy results    HPI: 75 yo male with elevated PSA who underwent prostate biopsy on 7/5/21.  No problems after the biopsy    OBJECTIVE: Pathology from 7/5/21 shows Gl 3+4=7 in 20 and 30% of tissue from the right mid, 5% pattern 4, and Gl 3+3=6 in right base and left apex.    ASSESSMENT AND PLAN:  Over half of today's 48-minute visit was spent counseling the patient regarding his new diagnosis of prostate cancer.  I suggested to the patient that given his PSA less between 10-20, abnormal exam and Excello score of 7 he has intermediate-risk prostate cancer.  As such, the patient will not require a staging evaluation.  The patient would be a candidate for observation, definitive therapy including surgery, both open and robotically assisted laparoscopic approaches, as well as radiation therapy in the form of external beam radiation versus brachytherapy versus proton beam therapy.  We also briefly touched on HIFU and cryotherapy.  We discussed risks of surgery including leaking of urine and erectile dysfunction.  I counseled the patient that at a year from surgery 90% of men would be dry and not need to wear any pads and 70% of men in their early to mid 50s with perfect erectile function going in who underwent a bilateral nerve-sparing would be able to have erections sufficient for intercourse with or without the use of Viagra.  We also discussed risks of radiation therapy including development of  irritative urinary symptoms such as urgency, frequency of urination or urgency, frequency of stooling, chronically loose stools or blood in the stool as well as blood in the urine.  We also discussed erectile dysfunction is a risk of radiation therapy such that 2-3 years following the treatment 50% of men will have new onset or worsening of preexisting erectile dysfunction.  We also discussed rectal toxicity from radiation can be reduced with placement of SpaceOAR.  We discussed that patients can have radiation after surgery, but generally speaking, we do not do it in reverse.  The patient asked many good questions today and these were answered to his satisfaction.  We also discussed the utility of genomic testing if the patient is considering observation.  At this point, I suggested the patient meet with Radiation Oncology to hear about radiation options directly from them.  In addition, I have also suggested the patient obtain Dr. Joaquim Koehler's Guide to Surviving Prostate Cancer, which is an excellent resource for all patients with prostate cancer.  We would then plan to see the patient back in 4-6 weeks from now to answer further questions and begin to make some plans at that time.  Patient has a bladder stone and may need to evaluate his voiding further depending on his treatment choice for his prostate cancer.  Phone call duration: 36 minutes    Again, thank you for allowing me to participate in the care of your patient.      Sincerely,    Royal Escobedo MD

## 2021-07-12 NOTE — PROGRESS NOTES
Filemon is a 76 year old who is being evaluated via a billable telephone visit.      What phone number would you like to be contacted at? [phone]  How would you like to obtain your AVS? Maximiliano     CC: prostate biopsy results    HPI: 77 yo male with elevated PSA who underwent prostate biopsy on 7/5/21.  No problems after the biopsy    OBJECTIVE: Pathology from 7/5/21 shows Gl 3+4=7 in 20 and 30% of tissue from the right mid, 5% pattern 4, and Gl 3+3=6 in right base and left apex.    ASSESSMENT AND PLAN:  Over half of today's 48-minute visit was spent counseling the patient regarding his new diagnosis of prostate cancer.  I suggested to the patient that given his PSA less between 10-20, abnormal exam and [place] score of 7 he has intermediate-risk prostate cancer.  As such, the patient will not require a staging evaluation.  The patient would be a candidate for observation, definitive therapy including surgery, both open and robotically assisted laparoscopic approaches, as well as radiation therapy in the form of external beam radiation versus brachytherapy versus proton beam therapy.  We also briefly touched on HIFU and cryotherapy.  We discussed risks of surgery including leaking of urine and erectile dysfunction.  I counseled the patient that at a year from surgery 90% of men would be dry and not need to wear any pads and 70% of men in their early to mid 50s with perfect erectile function going in who underwent a bilateral nerve-sparing would be able to have erections sufficient for intercourse with or without the use of Viagra.  We also discussed risks of radiation therapy including development of irritative urinary symptoms such as urgency, frequency of urination or urgency, frequency of stooling, chronically loose stools or blood in the stool as well as blood in the urine.  We also discussed erectile dysfunction is a risk of radiation therapy such that 2-3 years following the treatment 50% of men will have  new onset or worsening of preexisting erectile dysfunction.  We also discussed rectal toxicity from radiation can be reduced with placement of SpaceOAR.  We discussed that patients can have radiation after surgery, but generally speaking, we do not do it in reverse.  The patient asked many good questions today and these were answered to his satisfaction.  We also discussed the utility of genomic testing if the patient is considering observation.  At this point, I suggested the patient meet with Radiation Oncology to hear about radiation options directly from them.  In addition, I have also suggested the patient obtain Dr. Joaquim Koehler's Guide to Surviving Prostate Cancer, which is an excellent resource for all patients with prostate cancer.  We would then plan to see the patient back in 4-6 weeks from now to answer further questions and begin to make some plans at that time.  Patient has a bladder stone and may need to evaluate his voiding further depending on his treatment choice for his prostate cancer.  Phone call duration: 36 minutes

## 2021-07-12 NOTE — NURSING NOTE
Chief Complaint   Patient presents with     Results     biopsy and MRI results     - Violette Turner,   EMT Clinic Support

## 2021-07-27 ENCOUNTER — MYC MEDICAL ADVICE (OUTPATIENT)
Dept: UROLOGY | Facility: CLINIC | Age: 76
End: 2021-07-27

## 2021-08-16 NOTE — PROGRESS NOTES
RADIATION ONCOLOGY CONSULTATION  [facility] PHYSICIANS    DATE:  8/17/2021     PATIENT NAME: Filemon Modi  MEDICAL RECORD NUMBER: [id number]    REFERRING PHYSICIAN:  Dr. Escobedo    REASON FOR CONSULTATION: Consideration of  definitive radiotherapy for management of adenocarcinoma of the prostate.    Staging:  Clinical  T1c N0 M0   Prostate MRI: PIRAD 5, Anterior fibromuscular stroma +     SV: (-)     JACKIE: High suspicion     LN: (-)    Laura's score:   Laura's score: 3+4=7  (<50% cores+)    PSA:     Date Value   04/21/2021 14.50    12/09/2020 14.9      PSA density:  0.17    Prostate Volume:    87cc (MRI)    NCCN :    Unfavorable intermediate risk(>50% cores+, Laura 4+3, 2 or more Intermediate risks)    CHAZ RISK(%): Organ Confined(OC): 52      Extra Prostattic Extension(EPE+): 34      Seminal Vesicle Involvement(SV)+: 8       Lymph Node Involvement (LN)+: 5    DEZ RISK(%):  OC: 26       EPE+:70      SV+: 8      LN+: 9        HISTORY OF PRESENT ILLNESS: The patient is a 76 year old  man who was found to have an elevated PSA with a prostate biopsy confirmation of adenocarcinoma of the prostate.    The patient presented with hematospermia with prostatitis-like symptoms in 2020.  Patient had elevated PSA of 14.9 in December 9, 2020 and was partially treated with antibiotics for suspected prostatitis.    The repeat PSA from April 21, 2021 was 14.5.  A Prostate MRI (6/15/2021) showed a  PI-RADS 5 lesion located at the anterior right mid gland transition/peripheral zone.  There was high suspicion of extraprostatic extension ( reviewed with radiology, disease is anterior with none posteriorly near the rectum).  There was no evidence of lymph node metastasis or seminal vesicle invasion.     The patient underwent MRI ultrasound fusion biopsy on July 5, 2021.  The pathology ([id number]) showed [place] score 3+4 = 7 from the right mid gland involving 2 of 2 cores.  Patient also had a Laura's 3+3 = 6  from the right base base (1 of 2 cores positive) and the left apex (1 of 2 cores positive).  The rectal exam done at the time of the biopsy shows no palpable abnormality in the prostate.      AUA Score: 6(weak stream 3, frequency 2, nocturia 1)  CALLIE score 9/25         PMH:   Past Medical History:   Diagnosis Date     Bilateral cataracts      Malignant neoplasm (H)     colon     Thyroid disease        PSH:  Past Surgical History:   Procedure Laterality Date     ABDOMEN SURGERY      bowel resection     APPENDECTOMY       COLONOSCOPY  01/04/2018     EYE SURGERY  6/01/2011    retinal detachment repair with scleral buckle     EYE SURGERY  2002    retinal reattachment for LE     HERNIA REPAIR      x2     PHACOEMULSIFICATION WITH STANDARD INTRAOCULAR LENS IMPLANT Left 1/18/2019    Procedure: Left Eye Cataract Removal with Intraocular Lens Implant;  Surgeon: Ale Herndon MD;  Location: [facility] OR     PHACOEMULSIFICATION WITH STANDARD INTRAOCULAR LENS IMPLANT Right 1/25/2019    Procedure: Right Eye Cataract Removal with Intraocular Lens Implant;  Surgeon: Ale Herndon MD;  Location: [facility] OR     RETINAL REATTACHMENT      x2       MEDICATIONS:  Current Outpatient Medications   Medication Sig Dispense Refill     Brimonidine Tartrate 0.025 % SOLN Apply 1 drop to eye as needed       Cholecalciferol (VITAMIN D PO) Take 1,000 mg by mouth every morning        ciprofloxacin (CIPRO) 500 MG tablet Take 1 tablet (500 mg) by mouth 2 times daily 6 tablet 0     doxycycline hyclate (VIBRA-TABS) 100 MG tablet Take 1 tablet (100 mg) by mouth 2 times daily 60 tablet 0     Levothyroxine Sodium (LEVOTHROID PO) Take 125 mcg by mouth every morning        tamsulosin (FLOMAX) 0.4 MG capsule          ALLERGY:  No Known Allergies    FAMILY HISTORY:  Family History   Problem Relation Age of Onset     Chronic Obstructive Pulmonary Disease Mother      Alzheimer Disease Mother      Hypertension Mother      Alcoholism Father      Back Pain Father       Pneumonia Father          from pneumonia s/p having a stroke during a cholecystectomy     Cerebrovascular Disease Father      Hyperlipidemia Brother      Thyroid Disease Brother      Cerebrovascular Disease Maternal Grandfather      Myocardial Infarction Maternal Grandfather      Suicide Brother      Other - See Comments Brother          from hyperthermia in a sauna     Lymphoma Brother      Unknown/Adopted Paternal Grandfather      Glaucoma No family hx of      Macular Degeneration No family hx of        SOCIAL HISTORY  Social History     Tobacco Use     Smoking status: Never Smoker     Smokeless tobacco: Never Used   Substance Use Topics     Alcohol use: Yes     Alcohol/week: 7.0 standard drinks     Types: 7 Glasses of wine per week     Comment: wine daily        ROS: 10 point ROS reviewed as reported on the nursing assessment note.      PHYSICAL EXAMINATION:  VS: Vitals: BP (!) 164/79   Pulse 63   Wt 88.5 kg (195 lb 3.2 oz)   BMI 24.40 kg/m    BMI= Body mass index is 24.4 kg/m .    IMPRESSION:  Unfavorable intermediate risk(>50% cores+, [place] 4+3, 2 or more Intermediate risks) adenocarcinoma of prostate.     RECOMMENDATION: The patient has unfavorable intermediate risk adenocarcinoma the prostate and the patient has treatment options including surgery, radiotherapy, or possibly active surveillance.    I recommend either surgery or radiotherapt for this patient rather than observation. I believe there is an advantage for patients who have unfavorable risk prostate cancers to undergo treatment.    Therefore, I did not recommend active surveillance but intervention for this patient.  In addition to surgery for which the patient has met with the urology service, I recommend consideration of radiotherapy.  In this setting, the radiotherapy is in the form of external beam or brachy therapy. The brachytherapy will be difficult with this volume and I do not recommend it.     I recommend a consideration of  hypofractionated radiotherapy with short-term hormones(6 months) for management of the intermediate risk (unfavorable) cancer.     If the patient chooses external beam radiation therapy route, I recommend  fiducial as well as SpaceOAR placement to reduce the potential toxicity associated with radiotherapy. The MRI was reviewed with radiology and there is very little posterior extension of the tumor. Therefore, SpaceOAR is considered.     I reviewed my recommendations with the patient and answered all questions. I also discussed alternative therapies.     I explained the benefits of radiotherapy as well as related toxicities. I described the early and late toxicities associated with radiotherapy. The toxicities are itemized on the consent form for prostate radiotherapy.        DELIA Rahman M.D.  Department of Radiation Oncology  [facility]

## 2021-08-17 ENCOUNTER — OFFICE VISIT (OUTPATIENT)
Dept: RADIATION ONCOLOGY | Facility: CLINIC | Age: 76
End: 2021-08-17
Attending: UROLOGY
Payer: COMMERCIAL

## 2021-08-17 VITALS
SYSTOLIC BLOOD PRESSURE: 164 MMHG | WEIGHT: 195.2 LBS | BODY MASS INDEX: 24.4 KG/M2 | HEART RATE: 63 BPM | DIASTOLIC BLOOD PRESSURE: 79 MMHG

## 2021-08-17 DIAGNOSIS — C61 MALIGNANT NEOPLASM OF PROSTATE (H): Primary | ICD-10-CM

## 2021-08-17 DIAGNOSIS — C61 PROSTATE CANCER (H): ICD-10-CM

## 2021-08-17 PROCEDURE — G0463 HOSPITAL OUTPT CLINIC VISIT: HCPCS | Performed by: RADIOLOGY

## 2021-08-17 ASSESSMENT — ENCOUNTER SYMPTOMS
FALLS: 0
HEARTBURN: 0
NAUSEA: 0
VOMITING: 0
DEPRESSION: 0
FREQUENCY: 0
WHEEZING: 0
DIARRHEA: 0
SHORTNESS OF BREATH: 0
DIZZINESS: 0
BACK PAIN: 0
FEVER: 0
CHILLS: 0
CONSTIPATION: 0
NERVOUS/ANXIOUS: 0
SORE THROAT: 0
HEADACHES: 0
WEIGHT LOSS: 0
COUGH: 0
BLURRED VISION: 0
NECK PAIN: 0

## 2021-08-17 NOTE — PROGRESS NOTES
HPI    INITIAL PATIENT ASSESSMENT    Diagnosis: prostate cancer    Prior radiation therapy: None    Prior chemotherapy: None    Prior hormonal therapy:No    Pain Eval:  Denies    Psychosocial  Living arrangements: brother  Fall Risk: independent   referral needs: Not needed    Advanced Directive: Yes - Location: home  Implantable Cardiac Device? No    Onset of menarche:   LMP: No LMP for male patient.  Onset of menopause:   Abnormal vaginal bleeding/discharge:   Are you pregnant?   Reproductive note: no    Nurse face-to-face time: Level 4:  15 min face to face time  Review of Systems   Constitutional: Negative for chills, fever, malaise/fatigue and weight loss.   HENT: Negative for congestion, hearing loss, sore throat and tinnitus.    Eyes: Negative for blurred vision.   Respiratory: Negative for cough, shortness of breath and wheezing.    Cardiovascular: Negative for chest pain and leg swelling.   Gastrointestinal: Negative for constipation, diarrhea, heartburn, nausea and vomiting.   Genitourinary: Negative for frequency and urgency.   Musculoskeletal: Negative for back pain, falls and neck pain.   Skin: Negative for rash.   Neurological: Negative for dizziness and headaches.   Endo/Heme/Allergies: Negative for environmental allergies.   Psychiatric/Behavioral: Negative for depression. The patient is not nervous/anxious.

## 2021-08-17 NOTE — LETTER
8/17/2021         RE: Filemon Modi  [address] [facility] [address]        Dear Colleague,    Thank you for referring your patient, Filemon Modi, to the [facility] RADIATION ONCOLOGY. Please see a copy of my visit note below.      RADIATION ONCOLOGY CONSULTATION  [facility] PHYSICIANS    DATE:  8/17/2021     PATIENT NAME: Filemon Modi  MEDICAL RECORD NUMBER: [id number]    REFERRING PHYSICIAN:  Dr. Escobedo    REASON FOR CONSULTATION: Consideration of  definitive radiotherapy for management of adenocarcinoma of the prostate.    Staging:  Clinical  T1c N0 M0   Prostate MRI: PIRAD 5, Anterior fibromuscular stroma +     SV: (-)     JACKIE: High suspicion     LN: (-)    Greenwood's score:   Greenwood's score: 3+4=7  (<50% cores+)    PSA:     Date Value   04/21/2021 14.50    12/09/2020 14.9      PSA density:  0.17    Prostate Volume:    87cc (MRI)    NCCN :    Unfavorable intermediate risk(>50% cores+, [place] 4+3, 2 or more Intermediate risks)    CHAZ RISK(%): Organ Confined(OC): 52      Extra Prostattic Extension(EPE+): 34      Seminal Vesicle Involvement(SV)+: 8       Lymph Node Involvement (LN)+: 5    DEZ RISK(%):  OC: 26       EPE+:70      SV+: 8      LN+: 9        HISTORY OF PRESENT ILLNESS: The patient is a 76 year old  man who was found to have an elevated PSA with a prostate biopsy confirmation of adenocarcinoma of the prostate.    The patient presented with hematospermia with prostatitis-like symptoms in 2020.  Patient had elevated PSA of 14.9 in December 9, 2020 and was partially treated with antibiotics for suspected prostatitis.    The repeat PSA from April 21, 2021 was 14.5.  A Prostate MRI (6/15/2021) showed a  PI-RADS 5 lesion located at the anterior right mid gland transition/peripheral zone.  There was high suspicion of extraprostatic extension ( reviewed with radiology, disease is anterior with none posteriorly near the rectum).  There was no evidence of lymph  node metastasis or seminal vesicle invasion.     The patient underwent MRI ultrasound fusion biopsy on July 5, 2021.  The pathology ([id number]) showed [place] score 3+4 = 7 from the right mid gland involving 2 of 2 cores.  Patient also had a Cambria's 3+3 = 6 from the right base base (1 of 2 cores positive) and the left apex (1 of 2 cores positive).  The rectal exam done at the time of the biopsy shows no palpable abnormality in the prostate.      AUA Score: 6(weak stream 3, frequency 2, nocturia 1)  CALLIE score 9/25         PMH:   Past Medical History:   Diagnosis Date     Bilateral cataracts      Malignant neoplasm (H)     colon     Thyroid disease        PSH:  Past Surgical History:   Procedure Laterality Date     ABDOMEN SURGERY      bowel resection     APPENDECTOMY       COLONOSCOPY  01/04/2018     EYE SURGERY  6/01/2011    retinal detachment repair with scleral buckle     EYE SURGERY  2002    retinal reattachment for LE     HERNIA REPAIR      x2     PHACOEMULSIFICATION WITH STANDARD INTRAOCULAR LENS IMPLANT Left 1/18/2019    Procedure: Left Eye Cataract Removal with Intraocular Lens Implant;  Surgeon: Ale Herndon MD;  Location: [facility] OR     PHACOEMULSIFICATION WITH STANDARD INTRAOCULAR LENS IMPLANT Right 1/25/2019    Procedure: Right Eye Cataract Removal with Intraocular Lens Implant;  Surgeon: Ale Herndon MD;  Location: [facility] OR     RETINAL REATTACHMENT      x2       MEDICATIONS:  Current Outpatient Medications   Medication Sig Dispense Refill     Brimonidine Tartrate 0.025 % SOLN Apply 1 drop to eye as needed       Cholecalciferol (VITAMIN D PO) Take 1,000 mg by mouth every morning        ciprofloxacin (CIPRO) 500 MG tablet Take 1 tablet (500 mg) by mouth 2 times daily 6 tablet 0     doxycycline hyclate (VIBRA-TABS) 100 MG tablet Take 1 tablet (100 mg) by mouth 2 times daily 60 tablet 0     Levothyroxine Sodium (LEVOTHROID PO) Take 125 mcg by mouth every morning        tamsulosin (FLOMAX) 0.4 MG  capsule          ALLERGY:  No Known Allergies    FAMILY HISTORY:  Family History   Problem Relation Age of Onset     Chronic Obstructive Pulmonary Disease Mother      Alzheimer Disease Mother      Hypertension Mother      Alcoholism Father      Back Pain Father      Pneumonia Father          from pneumonia s/p having a stroke during a cholecystectomy     Cerebrovascular Disease Father      Hyperlipidemia Brother      Thyroid Disease Brother      Cerebrovascular Disease Maternal Grandfather      Myocardial Infarction Maternal Grandfather      Suicide Brother      Other - See Comments Brother          from hyperthermia in a sauna     Lymphoma Brother      Unknown/Adopted Paternal Grandfather      Glaucoma No family hx of      Macular Degeneration No family hx of        SOCIAL HISTORY  Social History     Tobacco Use     Smoking status: Never Smoker     Smokeless tobacco: Never Used   Substance Use Topics     Alcohol use: Yes     Alcohol/week: 7.0 standard drinks     Types: 7 Glasses of wine per week     Comment: wine daily        ROS: 10 point ROS reviewed as reported on the nursing assessment note.      PHYSICAL EXAMINATION:  VS: Vitals: BP (!) 164/79   Pulse 63   Wt 88.5 kg (195 lb 3.2 oz)   BMI 24.40 kg/m    BMI= Body mass index is 24.4 kg/m .    IMPRESSION:  Unfavorable intermediate risk(>50% cores+, [place] 4+3, 2 or more Intermediate risks) adenocarcinoma of prostate.     RECOMMENDATION: The patient has unfavorable intermediate risk adenocarcinoma the prostate and the patient has treatment options including surgery, radiotherapy, or possibly active surveillance.    I recommend either surgery or radiotherapt for this patient rather than observation. I believe there is an advantage for patients who have unfavorable risk prostate cancers to undergo treatment.    Therefore, I did not recommend active surveillance but intervention for this patient.  In addition to surgery for which the patient has met with  the urology service, I recommend consideration of radiotherapy.  In this setting, the radiotherapy is in the form of external beam or brachy therapy. The brachytherapy will be difficult with this volume and I do not recommend it.     I recommend a consideration of hypofractionated radiotherapy with short-term hormones(6 months) for management of the intermediate risk (unfavorable) cancer.     If the patient chooses external beam radiation therapy route, I recommend  fiducial as well as SpaceOAR placement to reduce the potential toxicity associated with radiotherapy. The MRI was reviewed with radiology and there is very little posterior extension of the tumor. Therefore, SpaceOAR is considered.     I reviewed my recommendations with the patient and answered all questions. I also discussed alternative therapies.     I explained the benefits of radiotherapy as well as related toxicities. I described the early and late toxicities associated with radiotherapy. The toxicities are itemized on the consent form for prostate radiotherapy.        DELIA Rahman M.D.  Department of Radiation Oncology  [facility]      HPI    INITIAL PATIENT ASSESSMENT    Diagnosis: prostate cancer    Prior radiation therapy: None    Prior chemotherapy: None    Prior hormonal therapy:No    Pain Eval:  Denies    Psychosocial  Living arrangements: brother  Fall Risk: independent   referral needs: Not needed    Advanced Directive: Yes - Location: home  Implantable Cardiac Device? No    Onset of menarche:   LMP: No LMP for male patient.  Onset of menopause:   Abnormal vaginal bleeding/discharge:   Are you pregnant?   Reproductive note: no    Nurse face-to-face time: Level 4:  15 min face to face time  Review of Systems   Constitutional: Negative for chills, fever, malaise/fatigue and weight loss.   HENT: Negative for congestion, hearing loss, sore throat and tinnitus.    Eyes: Negative for blurred vision.    Respiratory: Negative for cough, shortness of breath and wheezing.    Cardiovascular: Negative for chest pain and leg swelling.   Gastrointestinal: Negative for constipation, diarrhea, heartburn, nausea and vomiting.   Genitourinary: Negative for frequency and urgency.   Musculoskeletal: Negative for back pain, falls and neck pain.   Skin: Negative for rash.   Neurological: Negative for dizziness and headaches.   Endo/Heme/Allergies: Negative for environmental allergies.   Psychiatric/Behavioral: Negative for depression. The patient is not nervous/anxious.      Again, thank you for allowing me to participate in the care of your patient.        Sincerely,        Priti Rahman MD

## 2021-08-18 ENCOUNTER — MYC MEDICAL ADVICE (OUTPATIENT)
Dept: RADIATION ONCOLOGY | Facility: CLINIC | Age: 76
End: 2021-08-18

## 2021-08-21 ENCOUNTER — LAB (OUTPATIENT)
Dept: LAB | Facility: CLINIC | Age: 76
End: 2021-08-21
Payer: COMMERCIAL

## 2021-08-21 DIAGNOSIS — C61 PROSTATE CARCINOMA (H): Primary | ICD-10-CM

## 2021-08-21 LAB — BKR LAB AP ADDL TEST(S) ADDED: NORMAL

## 2021-09-07 ENCOUNTER — PRE VISIT (OUTPATIENT)
Dept: UROLOGY | Facility: CLINIC | Age: 76
End: 2021-09-07

## 2021-09-07 NOTE — TELEPHONE ENCOUNTER
Reason for visit: discuss care options      Dx/Hx/Sx: prostate cancer     Records/imaging/labs/orders: DECIPHER results in epic     At Rooming: telephone visit

## 2021-09-18 ENCOUNTER — HEALTH MAINTENANCE LETTER (OUTPATIENT)
Age: 76
End: 2021-09-18

## 2021-09-20 ENCOUNTER — VIRTUAL VISIT (OUTPATIENT)
Dept: UROLOGY | Facility: CLINIC | Age: 76
End: 2021-09-20
Payer: COMMERCIAL

## 2021-09-20 DIAGNOSIS — C61 PROSTATE CANCER (H): Primary | ICD-10-CM

## 2021-09-20 PROCEDURE — 99443 PR PHYSICIAN TELEPHONE EVALUATION 21-30 MIN: CPT | Mod: 95 | Performed by: UROLOGY

## 2021-09-20 ASSESSMENT — PAIN SCALES - GENERAL: PAINLEVEL: NO PAIN (0)

## 2021-09-20 NOTE — LETTER
9/20/2021       RE: Filemon Modi  [address] [facility] [address]     Dear Colleague,    Thank you for referring your patient, Filemon Modi, to the [facility] UROLOGY CLINIC [place] at [facility]. Please see a copy of my visit note below.    Filemon is a 76 year old who is being evaluated via a billable telephone visit.      What phone number would you like to be contacted at? [phone]  How would you like to obtain your AVS? MyChart   CC: prostate cancer     HPI: 75 yo male with elevated PSA who underwent prostate biopsy on 7/5/21 with Pathology from 7/5/21 shows Gl 3+4=7 in 20 and 30% of tissue from the right mid, 5% pattern 4, and Gl 3+3=6 in right base and left apex.  He was counseled on various treatment options and saw Dr. Rahman from radiation oncology and the visit today is to discuss options further.    OBJECTIVE:    DECIPHER from 7/5/21 biopsy is intermediate risk 0.57.    ASSESSMENT AND PLAN:   Over half of today's 53-minute visit was spent reviewing the chart, results counseling the patient regarding his prostate cancer.  We again discussed in great detail options of observation vs definitive therapy with surgery or radiation.  The patient asked many good questions today and these were answered to his satisfaction.  We will recheck a PSA in  October which will be 6 months from his previous PSA. If stable or lower, will consider finasteride therapy for 6 months to see if symptoms improve making radiation more attractive and it will also shrink the prostate making surgery easier should the patient wish to do that.  The patient is in agreement with the plan.    Phone call duration: 40 minutes      Royal Escobedo MD

## 2021-09-20 NOTE — PROGRESS NOTES
Filemon is a 76 year old who is being evaluated via a billable telephone visit.      What phone number would you like to be contacted at? [phone]  How would you like to obtain your AVS? Maximiliano   CC: prostate cancer     HPI: 75 yo male with elevated PSA who underwent prostate biopsy on 7/5/21 with Pathology from 7/5/21 shows Gl 3+4=7 in 20 and 30% of tissue from the right mid, 5% pattern 4, and Gl 3+3=6 in right base and left apex.  He was counseled on various treatment options and saw Dr. Rahman from radiation oncology and the visit today is to discuss options further.    OBJECTIVE:    DECIPHER from 7/5/21 biopsy is intermediate risk 0.57.    ASSESSMENT AND PLAN:   Over half of today's 53-minute visit was spent reviewing the chart, results counseling the patient regarding his prostate cancer.  We again discussed in great detail options of observation vs definitive therapy with surgery or radiation.  The patient asked many good questions today and these were answered to his satisfaction.  We will recheck a PSA in  October which will be 6 months from his previous PSA. If stable or lower, will consider finasteride therapy for 6 months to see if symptoms improve making radiation more attractive and it will also shrink the prostate making surgery easier should the patient wish to do that.  The patient is in agreement with the plan.      Phone call duration: 40 minutes

## 2021-10-26 ENCOUNTER — LAB (OUTPATIENT)
Dept: LAB | Facility: CLINIC | Age: 76
End: 2021-10-26
Payer: COMMERCIAL

## 2021-10-26 DIAGNOSIS — C61 PROSTATE CANCER (H): ICD-10-CM

## 2021-10-26 LAB — PSA SERPL-MCNC: 14.1 UG/L (ref 0–4)

## 2021-10-26 PROCEDURE — 84153 ASSAY OF PSA TOTAL: CPT | Performed by: PATHOLOGY

## 2021-10-26 PROCEDURE — 36415 COLL VENOUS BLD VENIPUNCTURE: CPT | Performed by: PATHOLOGY

## 2022-04-30 ENCOUNTER — HEALTH MAINTENANCE LETTER (OUTPATIENT)
Age: 77
End: 2022-04-30

## 2022-05-04 ENCOUNTER — LAB (OUTPATIENT)
Dept: LAB | Facility: CLINIC | Age: 77
End: 2022-05-04
Attending: UROLOGY
Payer: COMMERCIAL

## 2022-05-04 DIAGNOSIS — R97.20 ELEVATED PROSTATE SPECIFIC ANTIGEN (PSA): ICD-10-CM

## 2022-05-04 LAB
HOLD SPECIMEN: NORMAL
PSA SERPL-MCNC: 8.53 UG/L (ref 0–4)

## 2022-05-04 PROCEDURE — 84153 ASSAY OF PSA TOTAL: CPT | Performed by: PATHOLOGY

## 2022-05-04 PROCEDURE — 36415 COLL VENOUS BLD VENIPUNCTURE: CPT | Performed by: PATHOLOGY

## 2022-05-11 ENCOUNTER — MYC MEDICAL ADVICE (OUTPATIENT)
Dept: UROLOGY | Facility: CLINIC | Age: 77
End: 2022-05-11
Payer: COMMERCIAL

## 2022-05-11 DIAGNOSIS — R97.20 ELEVATED PROSTATE SPECIFIC ANTIGEN (PSA): ICD-10-CM

## 2022-05-11 RX ORDER — FINASTERIDE 5 MG/1
5 TABLET, FILM COATED ORAL DAILY
Qty: 30 TABLET | Refills: 5 | Status: ON HOLD | OUTPATIENT
Start: 2022-05-11 | End: 2023-04-07

## 2022-06-21 ENCOUNTER — PRE VISIT (OUTPATIENT)
Dept: UROLOGY | Facility: CLINIC | Age: 77
End: 2022-06-21
Payer: COMMERCIAL

## 2022-06-21 NOTE — TELEPHONE ENCOUNTER
Reason for visit: discuss treatment plan      Dx/Hx/Sx: prostate cancer     Records/imaging/labs/orders: in epic    At Rooming: video visit

## 2022-08-15 ENCOUNTER — VIRTUAL VISIT (OUTPATIENT)
Dept: UROLOGY | Facility: CLINIC | Age: 77
End: 2022-08-15
Payer: COMMERCIAL

## 2022-08-15 DIAGNOSIS — N40.1 BENIGN LOCALIZED PROSTATIC HYPERPLASIA WITH LOWER URINARY TRACT SYMPTOMS (LUTS): Primary | ICD-10-CM

## 2022-08-15 DIAGNOSIS — C61 PROSTATE CANCER (H): ICD-10-CM

## 2022-08-15 PROCEDURE — 99214 OFFICE O/P EST MOD 30 MIN: CPT | Mod: 95 | Performed by: UROLOGY

## 2022-08-15 RX ORDER — FINASTERIDE 5 MG/1
5 TABLET, FILM COATED ORAL DAILY
Qty: 90 TABLET | Refills: 3 | Status: ON HOLD | OUTPATIENT
Start: 2022-08-15 | End: 2023-04-07

## 2022-08-15 RX ORDER — ATORVASTATIN CALCIUM 20 MG/1
20 TABLET, FILM COATED ORAL AT BEDTIME
COMMUNITY
Start: 2022-06-24

## 2022-08-15 NOTE — LETTER
8/15/2022       RE: Filemon Modi  [address] [facility] [address]     Dear Colleague,    Thank you for referring your patient, Filemon Modi, to the [facility] UROLOGY CLINIC [place] at [facility]. Please see a copy of my visit note below.    Filemon is a 77 year old who is being evaluated via a billable video visit.      How would you like to obtain your AVS? MyChart  If the video visit is dropped, the invitation should be resent by: Send to e-mail at: [email or web address]   Pt stated he doesn't have video capability; wishes to be phone call  Will anyone else be joining your video visit? No        Video-Visit Details    Video Start Time:4:53pm    Type of service:  Video Visit  CC: prostate cancer and LUTS    HPI: 76 yo male with elevated PSA who underwent prostate biopsy on 7/5/21 with Pathology from 7/5/21 shows Gl 3+4=7 in 20 and 30% of tissue from the right mid, 5% pattern 4, and Gl 3+3=6 in right base and left apex.  He was counseled on various treatment options and saw Dr. Rahman from radiation oncology and has not yet sought definitive treatment.  He had significant LUTS and was started on finasteride 6 months ago.  He reports that the finasteride has helped greatly with his symptoms and that he now thinks his urination is adequate and not bothersome.  Had a recent PSA.    OBJECTIVE:  PSA from 5/4/22 is 8.53 ng/mL (17.06 adjusted for finasteride)    ASSESSMENT AND PLAN: Over half of today's 35-minute visit was spent reviewing the chart, results and counseling the patient regarding his prostate cancer and LUTS.  We are pleased to see the finasteride has helped with the LUTS.  This makes radiation a possibility should the patient want definitive therapy.  He is still interested in observation or focal therapy if he is eligible and also thinks he would need more evidence of more aggressive disease before considering definitive therapy and  therefore requests a biopsy.  We will plan on repeating a PSA, a prostate MRI and biopsy in January and then go from there.  We will see if he is eligible for VAPOR 2 at that time depending on results of these studies.      Video End Time:5:20pm    Originating Location (pt. Location): Home    Distant Location (provider location):  [facility] UROLOGY CLINIC [place]     Platform used for Video Visit: Kushal Escobedo MD

## 2022-08-15 NOTE — PROGRESS NOTES
Filemon is a 77 year old who is being evaluated via a billable video visit.      How would you like to obtain your AVS? Springbothart  If the video visit is dropped, the invitation should be resent by: Send to e-mail at: [email or web address]   Pt stated he doesn't have video capability; wishes to be phone call  Will anyone else be joining your video visit? No        Video-Visit Details    Video Start Time:4:53pm    Type of service:  Video Visit  CC: prostate cancer and LUTS    HPI: 78 yo male with elevated PSA who underwent prostate biopsy on 7/5/21 with Pathology from 7/5/21 shows Gl 3+4=7 in 20 and 30% of tissue from the right mid, 5% pattern 4, and Gl 3+3=6 in right base and left apex.  He was counseled on various treatment options and saw Dr. Rahman from radiation oncology and has not yet sought definitive treatment.  He had significant LUTS and was started on finasteride 6 months ago.  He reports that the finasteride has helped greatly with his symptoms and that he now thinks his urination is adequate and not bothersome.  Had a recent PSA.    OBJECTIVE:  PSA from 5/4/22 is 8.53 ng/mL (17.06 adjusted for finasteride)    ASSESSMENT AND PLAN: Over half of today's 35-minute visit was spent reviewing the chart, results and counseling the patient regarding his prostate cancer and LUTS.  We are pleased to see the finasteride has helped with the LUTS.  This makes radiation a possibility should the patient want definitive therapy.  He is still interested in observation or focal therapy if he is eligible and also thinks he would need more evidence of more aggressive disease before considering definitive therapy and therefore requests a biopsy.  We will plan on repeating a PSA, a prostate MRI and biopsy in January and then go from there.  We will see if he is eligible for VAPOR 2 at that time depending on results of these studies.      Video End Time:5:20pm    Originating Location (pt. Location): Home    Distant Location (provider  location):  [facility] UROLOGY CLINIC [place]     Platform used for Video Visit: AnabelleWell

## 2022-09-27 DIAGNOSIS — H35.373 EPIRETINAL MEMBRANE (ERM) OF BOTH EYES: Primary | ICD-10-CM

## 2022-10-07 ENCOUNTER — OFFICE VISIT (OUTPATIENT)
Dept: OPHTHALMOLOGY | Facility: CLINIC | Age: 77
End: 2022-10-07
Attending: OPHTHALMOLOGY
Payer: COMMERCIAL

## 2022-10-07 DIAGNOSIS — H35.373 EPIRETINAL MEMBRANE (ERM) OF BOTH EYES: Primary | ICD-10-CM

## 2022-10-07 PROCEDURE — 99213 OFFICE O/P EST LOW 20 MIN: CPT | Mod: GC | Performed by: OPHTHALMOLOGY

## 2022-10-07 PROCEDURE — G0463 HOSPITAL OUTPT CLINIC VISIT: HCPCS | Mod: 25

## 2022-10-07 PROCEDURE — 92134 CPTRZ OPH DX IMG PST SGM RTA: CPT | Performed by: OPHTHALMOLOGY

## 2022-10-07 PROCEDURE — 92015 DETERMINE REFRACTIVE STATE: CPT

## 2022-10-07 PROCEDURE — 92134 CPTRZ OPH DX IMG PST SGM RTA: CPT | Mod: 26 | Performed by: OPHTHALMOLOGY

## 2022-10-07 ASSESSMENT — REFRACTION_WEARINGRX
OS_ADD: +2.50
OS_SPHERE: -4.75
OD_CYLINDER: +0.50
OD_SPHERE: -2.25
OS_AXIS: 125
OD_AXIS: 085
OS_CYLINDER: +1.75
OD_ADD: +2.50

## 2022-10-07 ASSESSMENT — SLIT LAMP EXAM - LIDS
COMMENTS: NORMAL
COMMENTS: NORMAL

## 2022-10-07 ASSESSMENT — TONOMETRY
OS_IOP_MMHG: 15
OD_IOP_MMHG: 15
IOP_METHOD: TONOPEN

## 2022-10-07 ASSESSMENT — REFRACTION_MANIFEST
OD_CYLINDER: +0.50
OD_ADD: +2.50
OS_SPHERE: -3.50
OS_CYLINDER: +1.50
OD_SPHERE: -2.25
OS_AXIS: 106
OS_ADD: +2.50
OD_AXIS: 083

## 2022-10-07 ASSESSMENT — CUP TO DISC RATIO
OS_RATIO: 0.2
OD_RATIO: 0.3

## 2022-10-07 ASSESSMENT — VISUAL ACUITY
OS_PH_CC: 20/30
OS_CC: 20/40
CORRECTION_TYPE: GLASSES
OD_CC: 20/20
OS_PH_CC+: -1
METHOD: SNELLEN - LINEAR

## 2022-10-07 ASSESSMENT — CONF VISUAL FIELD
METHOD: COUNTING FINGERS
OS_NORMAL: 1
OD_NORMAL: 1

## 2022-10-07 ASSESSMENT — EXTERNAL EXAM - LEFT EYE: OS_EXAM: NORMAL

## 2022-10-07 ASSESSMENT — EXTERNAL EXAM - RIGHT EYE: OD_EXAM: NORMAL

## 2022-10-07 NOTE — NURSING NOTE
Chief Complaints and History of Present Illnesses   Patient presents with     Follow Up     Epiretinal Membrane Evaluation     Chief Complaint(s) and History of Present Illness(es)     Follow Up     Course: stable    Associated symptoms: Negative for dryness, eye pain, flashes and floaters    Treatments tried: eye drops    Pain scale: 0/10    Comments: Epiretinal Membrane Evaluation              Comments     He states that his vision has seemed stable in both eyes, since his last eye exam.  Patient denies having any eye discomfort.    He uses Lumify for eye redness 3-4 times a week.    Emerita Gutierrez, COT 7:58 AM  October 7, 2022

## 2022-10-07 NOTE — PROGRESS NOTES
CC -   H/o RD OU    INTERVAL HISTORY - Last visit with Dr. Lipscomb in 2021. Patient reports stable vision.     PMH -   Filemon Modi is a  76 year old year-old patient with history of retinal detachment repair both eyes with SBP + small gas bubble.  OS VA worse than OD all life, unsure if ever correctable to 20/20, large anisometropia.      PAST OCULAR SURGERY  SBP OS 2006 ([facility])  SBP OD 2011 ([facility])  CE/IOL OU  2019 ([facility])      RETINAL IMAGING:  OCT  10/07/22    OD - tr ERM, PVD, stable   OS - tr ERM, focal outer irregular, PVD, stable       ASSESSMENT & PLAN    #. H/o RD repair OU with SBP + gas bubble   - 2006 OS & 2011 OD   - retina flat today     #. tr ERM OU   - trace on OCT   - NVS   - Good vision acuity.       # ?Dry AMD OS   - focal outer irregular OS on OCT 10/2022    - regular veggie consumption   - could consider AREDS   - no h/o smoking      #.  PVD OU   - S/Sx RD d/w patient 10/2022    #. Pseudophakia each eye    - Cataract surgery in 2019   - doing well. VAcc 20/20, 20/25    #  Anisometropia   - per patient WRx doesn't incorporate full OS correction      return to clinic: 1 year, OCT MAC, VTD    Kary Aguilar MD  Resident Physician, PGY-3  Department of Ophthalmology  10/07/22 7:58 AM        ATTESTATION     Attending Attestation:     Complete documentation of historical and exam elements from today's encounter can be found in the full encounter summary report (not reduplicated in this progress note).  I personally obtained the chief complaint(s) and history of present illness.  I confirmed and edited as necessary the review of systems, past medical/surgical history, family history, social history, and examination findings as documented by others; and I examined the patient myself.  I personally reviewed the relevant tests, images, and reports as documented above.  I personally reviewed the ophthalmic test(s) associated with this encounter, agree with the interpretation(s) as documented by the  resident/fellow, and have edited the corresponding report(s) as necessary.   I formulated and edited as necessary the assessment and plan and discussed the findings and management plan with the patient and family    Nicolette Lipscomb MD, PhD  , Vitreoretinal Surgery  Department of Ophthalmology  [facility]

## 2023-01-17 ENCOUNTER — ANCILLARY PROCEDURE (OUTPATIENT)
Dept: MRI IMAGING | Facility: CLINIC | Age: 78
End: 2023-01-17
Attending: UROLOGY
Payer: COMMERCIAL

## 2023-01-17 ENCOUNTER — LAB (OUTPATIENT)
Dept: LAB | Facility: CLINIC | Age: 78
End: 2023-01-17
Payer: COMMERCIAL

## 2023-01-17 DIAGNOSIS — C61 PROSTATE CANCER (H): ICD-10-CM

## 2023-01-17 LAB — PSA SERPL-MCNC: 7.33 NG/ML (ref 0–6.5)

## 2023-01-17 PROCEDURE — 84153 ASSAY OF PSA TOTAL: CPT | Performed by: PATHOLOGY

## 2023-01-17 PROCEDURE — 36415 COLL VENOUS BLD VENIPUNCTURE: CPT | Performed by: PATHOLOGY

## 2023-01-17 PROCEDURE — A9585 GADOBUTROL INJECTION: HCPCS | Performed by: STUDENT IN AN ORGANIZED HEALTH CARE EDUCATION/TRAINING PROGRAM

## 2023-01-17 PROCEDURE — 72197 MRI PELVIS W/O & W/DYE: CPT | Performed by: STUDENT IN AN ORGANIZED HEALTH CARE EDUCATION/TRAINING PROGRAM

## 2023-01-17 RX ORDER — GADOBUTROL 604.72 MG/ML
10 INJECTION INTRAVENOUS ONCE
Status: COMPLETED | OUTPATIENT
Start: 2023-01-17 | End: 2023-01-17

## 2023-01-17 RX ORDER — GADOBUTROL 604.72 MG/ML
10 INJECTION INTRAVENOUS ONCE
Status: DISCONTINUED | OUTPATIENT
Start: 2023-01-17 | End: 2023-01-17

## 2023-01-17 RX ADMIN — GADOBUTROL 9 ML: 604.72 INJECTION INTRAVENOUS at 12:49

## 2023-01-23 ENCOUNTER — PRE VISIT (OUTPATIENT)
Dept: UROLOGY | Facility: CLINIC | Age: 78
End: 2023-01-23
Payer: COMMERCIAL

## 2023-01-23 ENCOUNTER — TELEPHONE (OUTPATIENT)
Dept: UROLOGY | Facility: CLINIC | Age: 78
End: 2023-01-23
Payer: COMMERCIAL

## 2023-01-23 DIAGNOSIS — C61 PROSTATE CANCER (H): Primary | ICD-10-CM

## 2023-01-23 RX ORDER — CIPROFLOXACIN 500 MG/1
500 TABLET, FILM COATED ORAL 2 TIMES DAILY
Qty: 6 TABLET | Refills: 0 | Status: SHIPPED | OUTPATIENT
Start: 2023-01-23 | End: 2023-01-26

## 2023-01-23 NOTE — TELEPHONE ENCOUNTER
[facility] Call Center    Phone Message    May a detailed message be left on voicemail: yes     Reason for Call: Patient is calling because he has not received his CIRPO medication for upcoming prostate bx. Please reach out. Thank you    Action Taken: Message routed to:  [facility] ([facility] Uro    Travel Screening: Not Applicable

## 2023-01-23 NOTE — TELEPHONE ENCOUNTER
Reason for visit: prostate biopsy      Dx/Hx/Sx: prostate cancer    Records/imaging/labs/orders: MRI in [facility]    At Rooming: MRI guided (PIRADS-5, 3 lesions), gent, collect urine    Called patient to go over prep for biopsy including:      No blood thinning medications for 7 days before procedure, including aspirin, anticoagulants, ibuprofen and fish oil.       prophylactic antibiotics sent to primary pharmacy listed in chart:   -take the day before, the day of and the day after the procedure, one tablet, two times daily.      Complete a Fleets enema approximately 2 hours before the scheduled procedure.      Do not come to the appointment fasted.      Come with a comfortably full bladder.

## 2023-01-30 ENCOUNTER — PRE VISIT (OUTPATIENT)
Dept: UROLOGY | Facility: CLINIC | Age: 78
End: 2023-01-30

## 2023-01-30 ENCOUNTER — OFFICE VISIT (OUTPATIENT)
Dept: UROLOGY | Facility: CLINIC | Age: 78
End: 2023-01-30
Payer: COMMERCIAL

## 2023-01-30 VITALS
SYSTOLIC BLOOD PRESSURE: 154 MMHG | HEART RATE: 68 BPM | WEIGHT: 185 LBS | DIASTOLIC BLOOD PRESSURE: 77 MMHG | HEIGHT: 75 IN | BODY MASS INDEX: 23 KG/M2

## 2023-01-30 DIAGNOSIS — N40.1 BENIGN LOCALIZED PROSTATIC HYPERPLASIA WITH LOWER URINARY TRACT SYMPTOMS (LUTS): ICD-10-CM

## 2023-01-30 DIAGNOSIS — C61 PROSTATE CANCER (H): Primary | ICD-10-CM

## 2023-01-30 DIAGNOSIS — R97.20 ELEVATED PROSTATE SPECIFIC ANTIGEN (PSA): ICD-10-CM

## 2023-01-30 PROCEDURE — 76999 ECHO EXAMINATION PROCEDURE: CPT | Performed by: UROLOGY

## 2023-01-30 PROCEDURE — 88305 TISSUE EXAM BY PATHOLOGIST: CPT | Mod: TC | Performed by: UROLOGY

## 2023-01-30 PROCEDURE — 55700 PR BIOPSY OF PROSTATE,NEEDLE/PUNCH: CPT | Performed by: UROLOGY

## 2023-01-30 PROCEDURE — 76872 US TRANSRECTAL: CPT | Performed by: UROLOGY

## 2023-01-30 PROCEDURE — 88305 TISSUE EXAM BY PATHOLOGIST: CPT | Mod: 26 | Performed by: PATHOLOGY

## 2023-01-30 RX ORDER — GENTAMICIN 40 MG/ML
80 INJECTION, SOLUTION INTRAMUSCULAR; INTRAVENOUS ONCE
Status: COMPLETED | OUTPATIENT
Start: 2023-01-30 | End: 2023-01-30

## 2023-01-30 RX ADMIN — GENTAMICIN 80 MG: 40 INJECTION, SOLUTION INTRAMUSCULAR; INTRAVENOUS at 08:13

## 2023-01-30 ASSESSMENT — PAIN SCALES - GENERAL: PAINLEVEL: NO PAIN (0)

## 2023-01-30 NOTE — NURSING NOTE
"Chief Complaint   Patient presents with     Prostate Biopsy       Blood pressure (!) 154/77, pulse 68, height 1.892 m (6' 2.5\"), weight 83.9 kg (185 lb). Body mass index is 23.43 kg/m .    There is no problem list on file for this patient.      No Known Allergies    Current Outpatient Medications   Medication Sig Dispense Refill     atorvastatin (LIPITOR) 20 MG tablet Take 20 mg by mouth daily       Brimonidine Tartrate 0.025 % SOLN Apply 1 drop to eye as needed       Cholecalciferol (VITAMIN D PO) Take 1,000 mg by mouth every morning        ciprofloxacin (CIPRO) 500 MG tablet Take 1 tablet (500 mg) by mouth 2 times daily 6 tablet 0     doxycycline hyclate (VIBRA-TABS) 100 MG tablet Take 1 tablet (100 mg) by mouth 2 times daily 60 tablet 0     finasteride (PROSCAR) 5 MG tablet Take 1 tablet (5 mg) by mouth daily 90 tablet 3     finasteride (PROSCAR) 5 MG tablet Take 1 tablet (5 mg) by mouth daily 30 tablet 5     Levothyroxine Sodium (LEVOTHROID PO) Take 125 mcg by mouth every morning        tamsulosin (FLOMAX) 0.4 MG capsule          Social History     Tobacco Use     Smoking status: Never     Smokeless tobacco: Never   Substance Use Topics     Alcohol use: Yes     Alcohol/week: 7.0 standard drinks     Types: 7 Glasses of wine per week     Comment: wine daily     Drug use: Yes     Types: Marijuana       Invasive Procedure Safety Checklist:    Procedure: MRI fusion TRUS prostate biopsy    Action: Complete sections and checkboxes as appropriate.    Pre-procedure:  1. Patient ID Verified with 2 identifiers (Marly and  or MRN) : YES    2. Procedure and site verified with patient/designee (when able) : YES    3. Accurate consent documentation in medical record : YES    4. H&P (or appropriate assessment) documented in medical record : N/A  H&P must be up to 30 days prior to procedure an updated within 24 hours of                 Procedure as applicable.     5. Relevant diagnostic and radiology test results appropriately " labeled and displayed as applicable : YES    6. Blood products, implants, devices, and/or special equipment available for the procedure as applicable : YES    7. Procedure site(s) marked with provider initials [Exclusions: none] : NO    8. Marking not required. Reason : Yes  Procedure does not require site marking    Time Out:     Time-Out performed immediately prior to starting procedure, including verbal and active participation of all team members addressing: YES    1. Correct patient identity.  2. Confirmed that the correct side and site are marked.  3. An accurate procedure to be done.  4. Agreement on the procedure to be done.  5. Correct patient position.  6. Relevant images and results are properly labeled and appropriately displayed.  7. The need to administer antibiotics or fluids for irrigation purposes during the procedure as applicable.  8. Safety precautions based on patient history or medication use.    During Procedure: Verification of correct person, site, and procedure occurs any time the responsibility for care of the patient is transferred to another member of the care team.    The following medication was given:     MEDICATION:  Lidocaine without epinephrine 1%  ROUTE: administered by physician - prostate nerve block   SITE: administered by physician - prostate nerve block    DOSE: 10 mL  LOT #: [id number]  : [place]  EXPIRATION DATE: 02/26  NDC#: 31724-571-14   Was there drug waste? Yes  Amount of drug waste (mL): 20 mL.  Reason for waste:  Single use vial    Prior to injection, verified patient identity using patient's name and date of birth.  Due to injection administration, patient instructed to remain in clinic for 15 minutes  afterwards, and to report any adverse reaction to me immediately.    Drug Amount Wasted:  Yes: 20 mL (10 mg/ml )  Vial/Syringe: Single dose vial    Kristine Arevalo  1/30/2023  7:56 AM

## 2023-01-30 NOTE — LETTER
1/30/2023       RE: Filemon Modi  [address] [facility] [address]     Dear Colleague,    Thank you for referring your patient, Filemon Modi, to the [facility] UROLOGY CLINIC [place] at [facility]. Please see a copy of my visit note below.    Reason for visit: MRI ultrasound fusion prostate biopsy    Indications: Mr. Modi is a 77 year old -year-old gentleman followed in clinic for prostate cancer. He presents today for  MRI/ultrasound fusion prostate biopsy.    Procedure: After informed consent was obtained and after confirming the patient has been off aspirin or aspirin like products for a week, took his antibiotics and perform his enema, the patient was placed left side down on the procedure table. Digital rectal exam was performed revealing a normal feeling prostate. The ultrasound probe was lubricated and placed into the patient's rectum without difficulty. Inspection of the prostate revealed a few calcifications, but no obvious hypoechoic regions.   Next 5 ccs of lidocaine were injected at the junction of the prostate and the seminal vesicles bilaterally.  Measurement of the prostate were then obtained revealing a volume of 53 ccs.  We then performed an ultrasound sweep of the prostate. These post-processed images were then utilized by the UroNav software to create a 3-D prostate volume.  These images were then overlaid on the patient's MRI from 1/17/23 and MRI/ ultrasound fusion was performed. We then obtained 2 cores from target #1, a lesion in the R mid TZ 11 oclock, target #2, a lesion in the R apex TZ 11 oclock, target #3, a lesion in the R mid PZ 7 oclock. We then obtained another 12 cores in the standard sextant distribution with an additional core each from the left and right transition zones for a total of 20 cores obtained. The patient tolerated the procedure without difficulty.    The patient was counseled he could expect to  see blood in his urine, semen, and stool for the next several days and should contact us should he develop any fevers chills or sweats. We'll see the patient back in a week to go over the results of his biopsy.       Royal Escobedo MD

## 2023-01-30 NOTE — PROGRESS NOTES
Reason for visit: MRI ultrasound fusion prostate biopsy    Indications: Mr. Modi is a 77 year old -year-old gentleman followed in clinic for prostate cancer. He presents today for  MRI/ultrasound fusion prostate biopsy.    Procedure: After informed consent was obtained and after confirming the patient has been off aspirin or aspirin like products for a week, took his antibiotics and perform his enema, the patient was placed left side down on the procedure table. Digital rectal exam was performed revealing a normal feeling prostate. The ultrasound probe was lubricated and placed into the patient's rectum without difficulty. Inspection of the prostate revealed a few calcifications, but no obvious hypoechoic regions.   Next 5 ccs of lidocaine were injected at the junction of the prostate and the seminal vesicles bilaterally.  Measurement of the prostate were then obtained revealing a volume of 53 ccs.  We then performed an ultrasound sweep of the prostate. These post-processed images were then utilized by the UroNav software to create a 3-D prostate volume.  These images were then overlaid on the patient's MRI from 1/17/23 and MRI/ ultrasound fusion was performed. We then obtained 2 cores from target #1, a lesion in the R mid TZ 11 oclock, target #2, a lesion in the R apex TZ 11 oclock, target #3, a lesion in the R mid PZ 7 oclock. We then obtained another 12 cores in the standard sextant distribution with an additional core each from the left and right transition zones for a total of 20 cores obtained. The patient tolerated the procedure without difficulty.    The patient was counseled he could expect to see blood in his urine, semen, and stool for the next several days and should contact us should he develop any fevers chills or sweats. We'll see the patient back in a week to go over the results of his biopsy.

## 2023-01-30 NOTE — PATIENT INSTRUCTIONS
[place] [facility]  Precautions Following a Prostate Biopsy    There are four conditions that you should watch for after a prostate biopsy:    Excessive pain  Bleeding irregularities (passing numerous  dime sized  clots or if your urine looks like cranberry juice)  Fever of 100 degrees or more  If you are unable to urinate      If any of these occur, call the Urology Clinic during normal business hours (M-F, 8:00-4:30) at [phone].  If you experience a problem after normal business hours, call our 24-hour phone number at [phone] and ask for the Urology Resident on call to be paged.    If you experience any discomfort following the biopsy, you may take Tylenol.  DO NOT TAKE ASPIRIN unless specified by your physician.   If the discomfort becomes severe or uncontrolled by medication, contact the Urology Clinic or Urology Resident (after normal business hours).    Do not be alarmed if you have some blood in your stool, in your urine, or ejaculate (semen).  This occurrence is normal and may last up to three (3) or four (4) days, usually intermittently.  Blood in the ejaculate (semen) may last several weeks, up to about a dozen ejaculations.  The blood in your ejaculate may appear as brown streaks, blood tinged, and immediately following a biopsy, it may appear bright red.    If you run a fever above 100 degrees, call the Urology Clinic or Urology Resident (after normal business hours) immediately.  If you are unable to reach your physician or the Resident on call, go to the nearest emergency room.  Explain that you have had a transrectal biopsy of your prostate and what problems you are experiencing.      You should attempt to urinate following your biopsy before you leave the clinic.  If you are unable to urinate four (4) to six (6) hours after you leave the clinic, you will need to contact the Urology Clinic or the Resident on call.  If you are unable to reach your physician or the  Resident on call, go to the nearest emergency room.            If you have any questions or concerns after your biopsy, feel free to contact the Urology Clinic at [phone] during M-F, 8:00-5pm business hours.  If you need to speak with someone after normal business hours, call [phone] and ask for the Resident on call to be paged.

## 2023-01-30 NOTE — NURSING NOTE
Verified with pt that he has been off aspirin products and blood thinners, started Cipro 500 mg BID x 3 days 1/29/23, and did a fleets enema this morning.    The following medication was given:     MEDICATION:  Gentamicin  ROUTE: IM  SITE: Ventrogluteal - Right  DOSE: 80mg  LOT #: [id number]  : [employer]  EXPIRATION DATE: 12/23  NDC#: 93472-900-28   Was there drug waste? No      Pt tolerated injection well.      Johnny Tapia EMT-P  1/30/2023  8:00 AM

## 2023-01-31 LAB
PATH REPORT.COMMENTS IMP SPEC: ABNORMAL
PATH REPORT.COMMENTS IMP SPEC: ABNORMAL
PATH REPORT.COMMENTS IMP SPEC: YES
PATH REPORT.FINAL DX SPEC: ABNORMAL
PATH REPORT.GROSS SPEC: ABNORMAL
PATH REPORT.MICROSCOPIC SPEC OTHER STN: ABNORMAL
PATH REPORT.RELEVANT HX SPEC: ABNORMAL
PHOTO IMAGE: ABNORMAL

## 2023-02-13 ENCOUNTER — PREP FOR PROCEDURE (OUTPATIENT)
Dept: UROLOGY | Facility: CLINIC | Age: 78
End: 2023-02-13

## 2023-02-13 ENCOUNTER — VIRTUAL VISIT (OUTPATIENT)
Dept: UROLOGY | Facility: CLINIC | Age: 78
End: 2023-02-13
Payer: COMMERCIAL

## 2023-02-13 DIAGNOSIS — C61 PROSTATE CANCER (H): Primary | ICD-10-CM

## 2023-02-13 PROCEDURE — 99214 OFFICE O/P EST MOD 30 MIN: CPT | Mod: VID | Performed by: UROLOGY

## 2023-02-13 RX ORDER — CEFAZOLIN SODIUM 2 G/50ML
2 SOLUTION INTRAVENOUS SEE ADMIN INSTRUCTIONS
Status: CANCELLED | OUTPATIENT
Start: 2023-02-13

## 2023-02-13 RX ORDER — CEFAZOLIN SODIUM 2 G/50ML
2 SOLUTION INTRAVENOUS
Status: CANCELLED | OUTPATIENT
Start: 2023-02-13

## 2023-02-13 NOTE — LETTER
2/13/2023       RE: Filemon Modi  [address] [facility] [address]     Dear Colleague,    Thank you for referring your patient, Filemon Modi, to the [facility] [place] [facility]. Please see a copy of my visit note below.    Video-Visit Details    Type of service:  Video Visit    Video Start Time (time video started): 3:27pm    CC: prostate cancer     HPI: 78 yo male with elevated PSA who underwent prostate biopsy on 7/5/21 with Pathology from 7/5/21 showing Gl 3+4=7 in 20 and 30% of tissue from the right mid, 5% pattern 4, and Gl 3+3=6 in right base and left apex and chose observation.  Had recent surveillance biopsy.    OBJECTIVE:  Pathology from 1/30/23 shows:     A. Prostate, target 1 RM TZ 11:00, needle biopsy:  - Prostate adenocarcinoma, acinar type  - Orange score 7 (3+4, grade 4: 40%)  - Grade group 2  - Extent: 2/2 cores (2.5 mm, 20%; 3 mm, 30%)  - High-grade prostatic intraepithelial neoplasia (HGPIN)    B. Prostate, target 2 RA TZ 11:00, needle biopsy:  - Prostate adenocarcinoma, acinar type  - [place] score 6 (3+3)  - Grade group 1  - Extent: 2/2 cores (2 mm, 30%; 0.5 mm, 5%)    G. Prostate, right base, needle biopsy:  - Prostate adenocarcinoma, acinar type  - [place] score 6 (3+3)  - Grade group 1  - Extent: 1/2 cores (1 mm, 10%)    H. Prostate, right mid, needle biopsy:  - Prostate adenocarcinoma, acinar type  - [place] score 7 (3+4, grade 4: 20%)  - Grade group 2  - Extent: 1/2 cores (1 mm, 10%; )    I. Prostate, right apex, needle biopsy:  - Prostate adenocarcinoma, acinar type  - [place] score 6 (3+3)  - Grade group 1  - Extent: 1/2 cores (0.5 mm, 5%)    J. Prostate, right TZ, needle biopsy:  - Prostate adenocarcinoma, acinar type  - Orange score 7 (3+4, grade 4: 30%)  - Grade group 2  - Extent: 1/1 core (2 mm, 20%)    ASSESSMENT AND PLAN: Over half of today's 25-minute visit was spent reviewing the chart,  results and counseling the patient regarding his prostate cancer.  I counseled the patient that we do see some increase in the amount of pattern 4 seen on the specimens and the number of cores with pattern 4.  We again discussed treatment options including observation vs surgery, vs radiation.  The patient strongly favors surgery at this time.  We discussed an increased risk of incontinence and ED compared to younger men undergoing this surgery and he understands and wishes to proceed.  We will get him on the schedule in the near future.    Video End Time (time video stopped): 4:01pm    Originating Location (pt. Location): Home        Distant Location (provider location):  Off-site    Mode of Communication:  Video Conference via [employer]        Sincerely,    Royal Escobedo MD

## 2023-02-13 NOTE — PROGRESS NOTES
Video-Visit Details    Type of service:  Video Visit    Video Start Time (time video started): 3:27pm    CC: prostate cancer     HPI: 78 yo male with elevated PSA who underwent prostate biopsy on 7/5/21 with Pathology from 7/5/21 showing Gl 3+4=7 in 20 and 30% of tissue from the right mid, 5% pattern 4, and Gl 3+3=6 in right base and left apex and chose observation.  Had recent surveillance biopsy.    OBJECTIVE:  Pathology from 1/30/23 shows:     A. Prostate, target 1 RM TZ 11:00, needle biopsy:  - Prostate adenocarcinoma, acinar type  - [place] score 7 (3+4, grade 4: 40%)  - Grade group 2  - Extent: 2/2 cores (2.5 mm, 20%; 3 mm, 30%)  - High-grade prostatic intraepithelial neoplasia (HGPIN)    B. Prostate, target 2 RA TZ 11:00, needle biopsy:  - Prostate adenocarcinoma, acinar type  - Morgan score 6 (3+3)  - Grade group 1  - Extent: 2/2 cores (2 mm, 30%; 0.5 mm, 5%)    G. Prostate, right base, needle biopsy:  - Prostate adenocarcinoma, acinar type  - Morgan score 6 (3+3)  - Grade group 1  - Extent: 1/2 cores (1 mm, 10%)    H. Prostate, right mid, needle biopsy:  - Prostate adenocarcinoma, acinar type  - [place] score 7 (3+4, grade 4: 20%)  - Grade group 2  - Extent: 1/2 cores (1 mm, 10%; )    I. Prostate, right apex, needle biopsy:  - Prostate adenocarcinoma, acinar type  - [place] score 6 (3+3)  - Grade group 1  - Extent: 1/2 cores (0.5 mm, 5%)    J. Prostate, right TZ, needle biopsy:  - Prostate adenocarcinoma, acinar type  - [place] score 7 (3+4, grade 4: 30%)  - Grade group 2  - Extent: 1/1 core (2 mm, 20%)    ASSESSMENT AND PLAN: Over half of today's 25-minute visit was spent reviewing the chart, results and counseling the patient regarding his prostate cancer.  I counseled the patient that we do see some increase in the amount of pattern 4 seen on the specimens and the number of cores with pattern 4.  We again discussed treatment options including observation vs surgery, vs radiation.  The patient strongly  favors surgery at this time.  We discussed an increased risk of incontinence and ED compared to younger men undergoing this surgery and he understands and wishes to proceed.  We will get him on the schedule in the near future.    Video End Time (time video stopped): 4:01pm    Originating Location (pt. Location): Home        Distant Location (provider location):  Off-site    Mode of Communication:  Video Conference via [email or web address]

## 2023-02-13 NOTE — NURSING NOTE
Is the patient currently in the state of [place]? YES    Visit mode:VIDEO    If the visit is dropped, the patient can be reconnected by: VIDEO VISIT: Text to cell phone: [phone]    Will anyone else be joining the visit? NO      How would you like to obtain your AVS? MyChart    Are changes needed to the allergy or medication list? NO    Comments or concerns regarding today's visit:

## 2023-02-21 ENCOUNTER — MYC MEDICAL ADVICE (OUTPATIENT)
Dept: UROLOGY | Facility: CLINIC | Age: 78
End: 2023-02-21
Payer: COMMERCIAL

## 2023-02-21 ENCOUNTER — TELEPHONE (OUTPATIENT)
Dept: UROLOGY | Facility: CLINIC | Age: 78
End: 2023-02-21
Payer: COMMERCIAL

## 2023-02-21 DIAGNOSIS — C61 PROSTATE CANCER (H): Primary | ICD-10-CM

## 2023-02-21 NOTE — PROGRESS NOTES
Sent my chart with pre surgery teaching and reminder of upcoming appointments.  This author's direct line provided for future questions/concerns.    Rc Joe RN  RN Care Coordinator - Urology

## 2023-02-21 NOTE — TELEPHONE ENCOUNTER
Patient is scheduled for surgery with Dr. Escobedo     Spoke with: Patient via phone      Date of Surgery: Friday April 07, 2023    [facility]      Informed patient they will need an adult : Yes     Pre-op: Yes     PAC EVAL: Friday March 24, 2023    Additional imaging/appointments:     Post-op: Monday April 17, 2023     Additional comments:      Surgery packet: Sent via mail 2/21/23    Patient is aware that surgery time is tentative to change and to expect a call 3-1 business days from Pre Admission Nursing for instructions and arrival time

## 2023-02-22 NOTE — TELEPHONE ENCOUNTER
FUTURE VISIT INFORMATION      SURGERY INFORMATION:    Date: 4/7/23    Location: [facility] or    Surgeon:  Royal Escobedo MD    Anesthesia Type:  general    Procedure: PROSTATECTOMY, ROBOT-ASSISTED, USING DA CARMEN XI, WITH PELVIC LYMPHADENECTOMY    Consult: virtual visit 2/13    RECORDS REQUESTED FROM:       Primary Care Provider: Shane Butler MD- [facility]    Most recent EKG+ Tracing: 3/2/12- [facility]

## 2023-03-20 ENCOUNTER — TELEPHONE (OUTPATIENT)
Dept: UROLOGY | Facility: CLINIC | Age: 78
End: 2023-03-20
Payer: COMMERCIAL

## 2023-03-20 NOTE — CONFIDENTIAL NOTE
Spoke with patient and offered to move surgery up 1 week, but patient declined and preferred to remain with original surgery date.  This author's direct line provided for future questions/concerns.    Rc Joe, RN  RN Care Coordinator - Urology

## 2023-03-23 LAB
ABO/RH(D): NORMAL
ANTIBODY SCREEN: NEGATIVE
SPECIMEN EXPIRATION DATE: NORMAL

## 2023-03-24 ENCOUNTER — ANESTHESIA EVENT (OUTPATIENT)
Dept: SURGERY | Facility: CLINIC | Age: 78
End: 2023-03-24
Payer: COMMERCIAL

## 2023-03-24 ENCOUNTER — LAB (OUTPATIENT)
Dept: LAB | Facility: CLINIC | Age: 78
End: 2023-03-24
Payer: COMMERCIAL

## 2023-03-24 ENCOUNTER — PRE VISIT (OUTPATIENT)
Dept: SURGERY | Facility: CLINIC | Age: 78
End: 2023-03-24

## 2023-03-24 ENCOUNTER — OFFICE VISIT (OUTPATIENT)
Dept: SURGERY | Facility: CLINIC | Age: 78
End: 2023-03-24
Payer: COMMERCIAL

## 2023-03-24 VITALS
TEMPERATURE: 97.6 F | WEIGHT: 188.2 LBS | DIASTOLIC BLOOD PRESSURE: 68 MMHG | RESPIRATION RATE: 16 BRPM | HEART RATE: 75 BPM | OXYGEN SATURATION: 96 % | HEIGHT: 75 IN | BODY MASS INDEX: 23.4 KG/M2 | SYSTOLIC BLOOD PRESSURE: 138 MMHG

## 2023-03-24 DIAGNOSIS — C61 PROSTATE CANCER (H): ICD-10-CM

## 2023-03-24 DIAGNOSIS — Z01.818 PREOP EXAMINATION: Primary | ICD-10-CM

## 2023-03-24 DIAGNOSIS — Z01.818 PREOP EXAMINATION: ICD-10-CM

## 2023-03-24 DIAGNOSIS — R73.09 OTHER ABNORMAL GLUCOSE: ICD-10-CM

## 2023-03-24 LAB
ALBUMIN UR-MCNC: NEGATIVE MG/DL
ANION GAP SERPL CALCULATED.3IONS-SCNC: 8 MMOL/L (ref 7–15)
APPEARANCE UR: CLEAR
BILIRUB UR QL STRIP: NEGATIVE
BUN SERPL-MCNC: 13.7 MG/DL (ref 8–23)
CALCIUM SERPL-MCNC: 9.6 MG/DL (ref 8.8–10.2)
CHLORIDE SERPL-SCNC: 107 MMOL/L (ref 98–107)
COLOR UR AUTO: ABNORMAL
CREAT SERPL-MCNC: 0.77 MG/DL (ref 0.67–1.17)
DEPRECATED HCO3 PLAS-SCNC: 28 MMOL/L (ref 22–29)
ERYTHROCYTE [DISTWIDTH] IN BLOOD BY AUTOMATED COUNT: 12.2 % (ref 10–15)
GFR SERPL CREATININE-BSD FRML MDRD: >90 ML/MIN/1.73M2
GLUCOSE SERPL-MCNC: 119 MG/DL (ref 70–99)
GLUCOSE UR STRIP-MCNC: NEGATIVE MG/DL
HBA1C MFR BLD: 5.8 %
HCT VFR BLD AUTO: 48.6 % (ref 40–53)
HGB BLD-MCNC: 16.1 G/DL (ref 13.3–17.7)
HGB UR QL STRIP: ABNORMAL
KETONES UR STRIP-MCNC: NEGATIVE MG/DL
LEUKOCYTE ESTERASE UR QL STRIP: NEGATIVE
MCH RBC QN AUTO: 30 PG (ref 26.5–33)
MCHC RBC AUTO-ENTMCNC: 33.1 G/DL (ref 31.5–36.5)
MCV RBC AUTO: 91 FL (ref 78–100)
MUCOUS THREADS #/AREA URNS LPF: PRESENT /LPF
NITRATE UR QL: NEGATIVE
PH UR STRIP: 6.5 [PH] (ref 5–7)
PLATELET # BLD AUTO: 229 10E3/UL (ref 150–450)
POTASSIUM SERPL-SCNC: 4.2 MMOL/L (ref 3.4–5.3)
RBC # BLD AUTO: 5.36 10E6/UL (ref 4.4–5.9)
RBC URINE: 9 /HPF
SODIUM SERPL-SCNC: 143 MMOL/L (ref 136–145)
SP GR UR STRIP: 1.02 (ref 1–1.03)
UROBILINOGEN UR STRIP-MCNC: NORMAL MG/DL
WBC # BLD AUTO: 5.3 10E3/UL (ref 4–11)
WBC URINE: <1 /HPF

## 2023-03-24 PROCEDURE — 83036 HEMOGLOBIN GLYCOSYLATED A1C: CPT | Performed by: PHYSICIAN ASSISTANT

## 2023-03-24 PROCEDURE — 81001 URINALYSIS AUTO W/SCOPE: CPT | Performed by: PATHOLOGY

## 2023-03-24 PROCEDURE — 36415 COLL VENOUS BLD VENIPUNCTURE: CPT | Performed by: PATHOLOGY

## 2023-03-24 PROCEDURE — 80048 BASIC METABOLIC PNL TOTAL CA: CPT | Performed by: PATHOLOGY

## 2023-03-24 PROCEDURE — 86850 RBC ANTIBODY SCREEN: CPT | Performed by: PHYSICIAN ASSISTANT

## 2023-03-24 PROCEDURE — 85027 COMPLETE CBC AUTOMATED: CPT | Performed by: PATHOLOGY

## 2023-03-24 RX ORDER — DIPHENHYDRAMINE HCL 25 MG
25 CAPSULE ORAL EVERY 6 HOURS PRN
COMMUNITY

## 2023-03-24 ASSESSMENT — ENCOUNTER SYMPTOMS: SEIZURES: 0

## 2023-03-24 ASSESSMENT — PAIN SCALES - GENERAL: PAINLEVEL: NO PAIN (0)

## 2023-03-24 ASSESSMENT — COPD QUESTIONNAIRES: COPD: 0

## 2023-03-24 ASSESSMENT — LIFESTYLE VARIABLES: TOBACCO_USE: 0

## 2023-03-24 NOTE — H&P
Pre-Operative H & P     CC:  Preoperative exam to assess for increased cardiopulmonary risk while undergoing surgery and anesthesia.    Date of Encounter: 3/24/2023  Primary Care Physician:  Joaquim Velez     Reason for visit:   Encounter Diagnoses   Name Primary?     Preop examination Yes     Prostate cancer (H)        HPI  Filemon Modi is a 77 year old male who presents for pre-operative H & P in preparation for  Procedure Information     Case: [id number] Date/Time: 04/07/23 1435    Procedure: PROSTATECTOMY, ROBOT-ASSISTED, USING DA CARMEN XI, WITH PELVIC LYMPHADENECTOMY (Pelvis)    Anesthesia type: General    Diagnosis: Prostate cancer (H) [C61]    Pre-op diagnosis: Prostate cancer (H) [C61]    Location:  OR 01 / [facility]    Providers: Royal Escobedo MD          Filemon Modi is a 76 y/o male with a PMH significant for HLD, hypothyroidism, and history of colon cancer s/p bowel resection who has prostate cancer. He initially had an elevated PSA and underwent a biopsy on 7/5/21. He consulted with Dr. Escobedo and the above surgery has been recommended for further management.     History is obtained from the patient and chart review    Hx of abnormal bleeding or anti-platelet use: None.      Past Medical History  Past Medical History:   Diagnosis Date     Bilateral cataracts      Malignant neoplasm (H)     colon     Prostate cancer (H)      Thyroid disease        Past Surgical History  Past Surgical History:   Procedure Laterality Date     ABDOMEN SURGERY      bowel resection     APPENDECTOMY       COLONOSCOPY  01/04/2018     EYE SURGERY  6/01/2011    retinal detachment repair with scleral buckle     EYE SURGERY  2002    retinal reattachment for LE     HERNIA REPAIR      x2     PHACOEMULSIFICATION WITH STANDARD INTRAOCULAR LENS IMPLANT Left 1/18/2019    Procedure: Left Eye Cataract Removal with Intraocular Lens Implant;  Surgeon: Ale Herndon MD;  Location:  OR     PHACOEMULSIFICATION WITH STANDARD  INTRAOCULAR LENS IMPLANT Right 1/25/2019    Procedure: Right Eye Cataract Removal with Intraocular Lens Implant;  Surgeon: Ale Herndon MD;  Location: [facility]     RETINAL REATTACHMENT      x2       Prior to Admission Medications  Current Outpatient Medications   Medication Sig Dispense Refill     atorvastatin (LIPITOR) 20 MG tablet Take 20 mg by mouth At Bedtime       Brimonidine Tartrate 0.025 % SOLN Apply 1 drop to eye as needed       Cholecalciferol (VITAMIN D PO) Take 1,000 mg by mouth every morning        diphenhydrAMINE (BENADRYL) 25 MG capsule Take 25 mg by mouth every 6 hours as needed for itching or allergies       finasteride (PROSCAR) 5 MG tablet Take 1 tablet (5 mg) by mouth daily (Patient taking differently: Take 5 mg by mouth every evening) 90 tablet 3     finasteride (PROSCAR) 5 MG tablet Take 1 tablet (5 mg) by mouth daily (Patient taking differently: Take 5 mg by mouth every evening) 30 tablet 5     Levothyroxine Sodium (LEVOTHROID PO) Take 125 mcg by mouth every morning        tamsulosin (FLOMAX) 0.4 MG capsule Take 0.4 mg by mouth every evening         Allergies  No Known Allergies    Social History  Social History     Socioeconomic History     Marital status: Single     Spouse name: Not on file     Number of children: 0     Years of education: Not on file     Highest education level: Not on file   Occupational History     Occupation: retired   Tobacco Use     Smoking status: Never     Smokeless tobacco: Never   Substance and Sexual Activity     Alcohol use: Yes     Alcohol/week: 7.0 standard drinks     Types: 7 Glasses of wine per week     Comment: wine daily     Drug use: Yes     Types: Marijuana     Comment: Daily     Sexual activity: Not on file   Other Topics Concern     Not on file   Social History Narrative     Not on file     Social Determinants of Health     Financial Resource Strain: Not on file   Food Insecurity: Not on file   Transportation Needs: Not on file   Physical Activity:  Not on file   Stress: Not on file   Social Connections: Not on file   Intimate Partner Violence: Not on file   Housing Stability: Not on file       Family History  Family History   Problem Relation Age of Onset     Chronic Obstructive Pulmonary Disease Mother      Alzheimer Disease Mother      Hypertension Mother      Alcoholism Father      Back Pain Father      Cerebrovascular Disease Father      Hyperlipidemia Brother      Thyroid Disease Brother      Suicide Brother      Other - See Comments Brother          from hyperthermia in a sauna     Lymphoma Brother      Cerebrovascular Disease Maternal Grandfather      Myocardial Infarction Maternal Grandfather      Unknown/Adopted Paternal Grandfather      Glaucoma No family hx of      Macular Degeneration No family hx of      Anesthesia Reaction No family hx of      Bleeding Disorder No family hx of      Venous thrombosis No family hx of        Review of Systems  The complete review of systems is negative other than noted in the HPI or here.   Anesthesia Evaluation   Pt has had prior anesthetic. Type: MAC and General.        ROS/MED HX  ENT/Pulmonary:  - neg pulmonary ROS  (-) tobacco use, asthma, COPD, sleep apnea and JONO risk factors   Neurologic:  - neg neurologic ROS  (-) no seizures and no CVA   Cardiovascular:     (+) Dyslipidemia -----Previous cardiac testing   Echo: Date: Results:    Stress Test: Date: Results:    ECG Reviewed: Date: 11 Results:  SB  Cath: Date: Results:   (-) hypertension, CAD, CHF and irregular heartbeat/palpitations   METS/Exercise Tolerance: >4 METS Comment: Exercises 5 days per week with indoor bike (x 40 mins), stretching, and hand weights without exertional symptoms.    Hematologic:  - neg hematologic  ROS   (+) history of blood transfusion, no previous transfusion reaction, - prior to colon resection ,  (-) history of blood clots   Musculoskeletal:   (+) arthritis (in hands bilaterally),     GI/Hepatic:  - neg GI/hepatic ROS   "(-) GERD, inflamatory bowel disease and liver disease   Renal/Genitourinary: Comment: Prostate cancer - neg Renal ROS  (-) renal disease   Endo:     (+) thyroid problem, hypothyroidism,     Psychiatric/Substance Use:  - neg psychiatric ROS   (+) Recreational drug usage: Cannabis. (-) psychiatric history   Infectious Disease:  - neg infectious disease ROS  (-) Recent Fever   Malignancy:   (+) Malignancy, History of Other and Prostate.Prostate CA Active status post.  Other CA Colon cancer Remission status post Surgery.    Other:            /68   Pulse 75   Temp 97.6  F (36.4  C) (Oral)   Resp 16   Ht 1.892 m (6' 2.5\")   Wt 85.4 kg (188 lb 3.2 oz)   SpO2 96%   BMI 23.84 kg/m      Physical Exam   Constitutional: Awake, alert, cooperative, no apparent distress, and appears stated age.  Eyes: Pupils equal, round and reactive to light, extra ocular muscles intact, sclera clear, conjunctiva normal.  HENT: Normocephalic, oral pharynx with moist mucus membranes, good dentition. No goiter appreciated.   Respiratory: Clear to auscultation bilaterally, no crackles or wheezing.  Cardiovascular: Regular rate and rhythm, normal S1 and S2, and no murmur noted.  Carotids +2, no bruits. No edema. Palpable pulses to radial  and PT arteries.   GI: Normal bowel sounds, soft, non-distended, non-tender, no masses palpated, no hepatosplenomegaly.    Lymph/Hematologic: No cervical lymphadenopathy and no supraclavicular lymphadenopathy.  Genitourinary:  Deferred.   Skin: Warm and dry.   Musculoskeletal: Full ROM of neck. There is no redness, warmth, or swelling of the joints. Gross motor strength is normal.    Neurologic: Awake, alert, oriented to name, place and time. Cranial nerves II-XII are grossly intact. Gait is normal.   Neuropsychiatric: Calm, cooperative. Normal affect.     Prior Labs/Diagnostic Studies   All labs and imaging personally reviewed       EKG/ stress test - if available please see in ROS above     The " patient's records and results personally reviewed by this provider.     Outside records reviewed from: Care Everywhere    LAB/DIAGNOSTIC STUDIES TODAY:   Component      Latest Ref Rng & Units 3/24/2023   Sodium      136 - 145 mmol/L 143   Potassium      3.4 - 5.3 mmol/L 4.2   Chloride      98 - 107 mmol/L 107   Carbon Dioxide (CO2)      22 - 29 mmol/L 28   Anion Gap      7 - 15 mmol/L 8   Urea Nitrogen      8.0 - 23.0 mg/dL 13.7   Creatinine      0.67 - 1.17 mg/dL 0.77   Calcium      8.8 - 10.2 mg/dL 9.6   Glucose      70 - 99 mg/dL 119 (H)   GFR Estimate      >60 mL/min/1.73m2 >90   WBC      4.0 - 11.0 10e3/uL 5.3   RBC Count      4.40 - 5.90 10e6/uL 5.36   Hemoglobin      13.3 - 17.7 g/dL 16.1   Hematocrit      40.0 - 53.0 % 48.6   MCV      78 - 100 fL 91   MCH      26.5 - 33.0 pg 30.0   MCHC      31.5 - 36.5 g/dL 33.1   RDW      10.0 - 15.0 % 12.2   Platelet Count      150 - 450 10e3/uL 229     Component      Latest Ref Rng & Units 3/24/2023   Hemoglobin A1C      <5.7 % 5.8 (H)       Assessment      Filemon Modi is a 77 year old male seen as a PAC referral for risk assessment and optimization for anesthesia.    Plan/Recommendations  Pt will be optimized for the proposed procedure.  See below for details on the assessment, risk, and preoperative recommendations    NEUROLOGY  - No history of TIA, CVA or seizure  -Post Op delirium risk factors:  Age    ENT  - No current airway concerns.  Will need to be reassessed day of surgery.  Mallampati: I  TM: > 3     CARDIAC  - No history of CAD, Hypertension and Afib.  - Hyperlipidemia  Continue atorvastatin on DOS.    - METS (Metabolic Equivalents)  Patient performs 4 or more METS exercise without symptoms            Total Score: 0      RCRI-Very low risk: Class 1 0.4% complication rate            Total Score: 0        PULMONARY  JONO Low Risk            Total Score: 2    JONO: Over 50 ys old    JONO: Male      - Denies asthma or inhaler use  - Tobacco History      History  "  Smoking Status     Never   Smokeless Tobacco     Never       GI  PONV Medium Risk  Total Score: 2           1 AN PONV: Patient is not a current smoker    1 AN PONV: Intended Post Op Opioids        /RENAL  - Baseline Creatinine  0.74      ENDOCRINE    - BMI: Estimated body mass index is 23.84 kg/m  as calculated from the following:    Height as of this encounter: 1.892 m (6' 2.5\").    Weight as of this encounter: 85.4 kg (188 lb 3.2 oz).  Healthy Weight (BMI 18.5-24.9)  - No history of Diabetes Mellitus. Glucose elevated today at 119. A1C ordered.   - Thyroid disorder  Continue home replacement while hospitalized.    HEME  VTE High Risk 3%            Total Score: 8    VTE: Greater than 59 yrs old    VTE: Male    VTE: Current cancer      - No history of abnormal bleeding or antiplatelet use.     OTHER  - Smokes marijuana daily  Instructed patient to hold x 24-48 hours prior surgery and patient agreed.       Different anesthesia methods/types have been discussed with the patient, but they are aware that the final plan will be decided by the assigned anesthesia provider on the date of service.    The patient is optimized for their procedure. AVS with information on surgery time/arrival time, meds and NPO status given by nursing staff. No further diagnostic testing indicated.      On the day of service:     Prep time: 15 minutes  Visit time: 21 minutes  Documentation time: 10 minutes  ------------------------------------------  Total time: 46 minutes      WYATT Barahona CNP (Supervised by WYATT Martinez CNP)  Preoperative Assessment Center  [facility]  Clinic and Surgery Center  Phone: [phone]  Fax: [phone]  "

## 2023-03-24 NOTE — PATIENT INSTRUCTIONS
Preparing for Your Surgery      Name:  Filemon Modi   MRN:  [id number]   :  1945   Today's Date:  3/24/2023       Arriving for surgery:  Surgery date:  2023  Arrival time:  12:30 pm     Surgeries and procedures: Adult patients can have 2 visitors all through the surgery process.     Visiting hours: 8 a.m. to 8:30 p.m.     Hospital: Adult patients and children under age 18 can have 4 visitor at a time     No visitors under the age of 5 are allowed for hospital patients.  Double occupancy rooms: Patients can have only two visitors at a time.     Patients with disabilities: Can have a support person with them (family member, service provider     Or someone well informed about their needs) plus the allowed number of visitors     Patients confirmed or suspected to have symptoms of COVID 19 or flu:     No visitors allowed for adult patients.   Children (under age 18) can have 1 named visitor.     People who are sick or showing symptoms of COVID 19 or flu:    Are not allowed to visit patients--we can only make exceptions in special situations.       Please follow these guidelines for your visit:   Arrive wearing a mask over your mouth and nose; we will give you a medical mask to wear    If you arrive wearing a cloth mask.   Keep it on during your entire visit, even when in patient's room.   If you don't wear a mask we'll ask you to leave.     Clean your hands with alcohol hand . Do this when you arrive at and leave the building and patient room,    And again after you touch your mask or anything in the room.     You can t visit if you have a fever, cough, shortness of breath, muscle aches, headaches, sore throat    Or diarrhea      Stay 6 feet away from others during your visit and between visits     Go directly to and from the room you are visiting.     Stay in the patient s room during your visit. Limit going to other places in the hospital as much as possible     Leave bags and jackets at home or  in the car.     For everyone s health, please don t come and go during your visit. That includes for smoking   during your visit.     Please come to:     [facility] [address]  [address] [place]  [address]    - parking is available in front of [facility] from 5:15AM to 8:00PM. If you prefer, park your car in the Green Lot.    -Proceed to the 3rd floor, check in at the Adult Surgery Waiting Lounge. [phone]    If an escort is needed stop at the Information Desk in the lobby.     What can I eat or drink?  -  You may eat and drink normally up to 8 hours prior to arrival time. (Until 4 am)  -  You may have clear liquids until 2 hours prior to arrival time. (Until 10:30 am)    Examples of clear liquids:  Water  Clear broth  Juices (apple, white grape, white cranberry  and cider) without pulp  Noncarbonated, powder based beverages  (lemonade and Agustin-Aid)  Sodas (Sprite, 7-Up, ginger ale and seltzer)  Coffee or tea (without milk or cream)  Gatorade    -  No Alcohol for at least 24 hours before surgery.     Which medicines can I take?    Hold Aspirin for 7 days before surgery.   Hold Multivitamins for 7 days before surgery.  Hold Supplements for 7 days before surgery.  Hold Ibuprofen (Advil, Motrin) for 1 day(s) before surgery--unless otherwise directed by surgeon.  Hold Naproxen (Aleve) for 4 days before surgery.    -  DO NOT take these medications the day of surgery:  Vitamin D  Diphenhydramine       -  PLEASE TAKE these medications the day of surgery:  Levothyroxine   Eye drops per your routine       How do I prepare myself?  - Please take 2 showers (one the night prior to surgery and one the morning of surgery) using Scrubcare or Hibiclens soap.    Use this soap only from the neck to your toes.     Leave the soap on your skin for one minute--then rinse thoroughly.      You may use your own shampoo and conditioner. No other hair  products.   - Please remove all jewelry and body piercings.  - No lotions, deodorants or fragrance.  - No makeup or fingernail polish.   - Bring your ID and insurance card.    -If you have a Deep Brain Stimulator, Spinal Cord Stimulator, or any Neuro Stimulator device---you must bring the remote control to the hospital.      ALL PATIENTS GOING HOME THE SAME DAY OF SURGERY ARE REQUIRED TO HAVE A RESPONSIBLE ADULT TO DRIVE AND BE IN ATTENDANCE WITH THEM FOR 24 HOURS FOLLOWING SURGERY.    Covid testing policy as of 12/06/2022  Your surgeon will notify and schedule you for a COVID test if one is needed before surgery--please direct any questions or COVID symptoms to your surgeon      Questions or Concerns:    - For any questions regarding the day of surgery or your hospital stay, please contact the Pre Admission Nursing Office at [phone].       - If you have health changes between today and your surgery, please call your surgeon.       - For questions after surgery, please call your surgeons office.

## 2023-03-29 ENCOUNTER — TELEPHONE (OUTPATIENT)
Dept: SURGERY | Facility: CLINIC | Age: 78
End: 2023-03-29
Payer: COMMERCIAL

## 2023-03-29 NOTE — TELEPHONE ENCOUNTER
Spoke with Filemon's brother, Jeronimo.  Clarified with Jeronimo that Filemon's blood type came back negative for antibodies.  This doesn't mean there was screening for infection, but it's just part of the blood matching process.  Jeronimo expressed understanding and stated he will pass the message onto his brother.  Maritza Reagan RN

## 2023-04-07 ENCOUNTER — ANESTHESIA (OUTPATIENT)
Dept: SURGERY | Facility: CLINIC | Age: 78
End: 2023-04-07
Payer: COMMERCIAL

## 2023-04-07 ENCOUNTER — HOSPITAL ENCOUNTER (OUTPATIENT)
Facility: CLINIC | Age: 78
Discharge: HOME | End: 2023-04-08
Attending: UROLOGY | Admitting: UROLOGY
Payer: COMMERCIAL

## 2023-04-07 DIAGNOSIS — C61 PROSTATE CANCER (H): Primary | ICD-10-CM

## 2023-04-07 LAB
CREAT SERPL-MCNC: 0.76 MG/DL (ref 0.67–1.17)
GFR SERPL CREATININE-BSD FRML MDRD: >90 ML/MIN/1.73M2
GLUCOSE BLDC GLUCOMTR-MCNC: 112 MG/DL (ref 70–99)
HGB BLD-MCNC: 14.6 G/DL (ref 13.3–17.7)

## 2023-04-07 PROCEDURE — 85018 HEMOGLOBIN: CPT | Performed by: STUDENT IN AN ORGANIZED HEALTH CARE EDUCATION/TRAINING PROGRAM

## 2023-04-07 PROCEDURE — 250N000009 HC RX 250

## 2023-04-07 PROCEDURE — 82565 ASSAY OF CREATININE: CPT | Performed by: STUDENT IN AN ORGANIZED HEALTH CARE EDUCATION/TRAINING PROGRAM

## 2023-04-07 PROCEDURE — 250N000011 HC RX IP 250 OP 636

## 2023-04-07 PROCEDURE — 250N000011 HC RX IP 250 OP 636: Performed by: UROLOGY

## 2023-04-07 PROCEDURE — 250N000011 HC RX IP 250 OP 636: Performed by: NURSE ANESTHETIST, CERTIFIED REGISTERED

## 2023-04-07 PROCEDURE — 999N000141 HC STATISTIC PRE-PROCEDURE NURSING ASSESSMENT: Performed by: UROLOGY

## 2023-04-07 PROCEDURE — 88307 TISSUE EXAM BY PATHOLOGIST: CPT | Mod: 26 | Performed by: PATHOLOGY

## 2023-04-07 PROCEDURE — 250N000013 HC RX MED GY IP 250 OP 250 PS 637: Performed by: STUDENT IN AN ORGANIZED HEALTH CARE EDUCATION/TRAINING PROGRAM

## 2023-04-07 PROCEDURE — 88309 TISSUE EXAM BY PATHOLOGIST: CPT | Mod: 26 | Performed by: PATHOLOGY

## 2023-04-07 PROCEDURE — 360N000080 HC SURGERY LEVEL 7, PER MIN: Performed by: UROLOGY

## 2023-04-07 PROCEDURE — 55866 LAPS SURG PRST8ECT RPBIC RAD: CPT | Mod: 22 | Performed by: UROLOGY

## 2023-04-07 PROCEDURE — 272N000001 HC OR GENERAL SUPPLY STERILE: Performed by: UROLOGY

## 2023-04-07 PROCEDURE — 250N000009 HC RX 250: Performed by: NURSE ANESTHETIST, CERTIFIED REGISTERED

## 2023-04-07 PROCEDURE — 250N000025 HC SEVOFLURANE, PER MIN: Performed by: UROLOGY

## 2023-04-07 PROCEDURE — 710N000010 HC RECOVERY PHASE 1, LEVEL 2, PER MIN: Performed by: UROLOGY

## 2023-04-07 PROCEDURE — 258N000003 HC RX IP 258 OP 636: Performed by: NURSE ANESTHETIST, CERTIFIED REGISTERED

## 2023-04-07 PROCEDURE — 88307 TISSUE EXAM BY PATHOLOGIST: CPT | Mod: TC | Performed by: UROLOGY

## 2023-04-07 PROCEDURE — 258N000003 HC RX IP 258 OP 636

## 2023-04-07 PROCEDURE — 370N000017 HC ANESTHESIA TECHNICAL FEE, PER MIN: Performed by: UROLOGY

## 2023-04-07 PROCEDURE — 38571 LAPAROSCOPY LYMPHADENECTOMY: CPT | Mod: GC | Performed by: UROLOGY

## 2023-04-07 PROCEDURE — 82365 CALCULUS SPECTROSCOPY: CPT | Performed by: UROLOGY

## 2023-04-07 PROCEDURE — 82962 GLUCOSE BLOOD TEST: CPT

## 2023-04-07 RX ORDER — BUPIVACAINE HYDROCHLORIDE 2.5 MG/ML
INJECTION, SOLUTION INFILTRATION; PERINEURAL PRN
Status: DISCONTINUED | OUTPATIENT
Start: 2023-04-07 | End: 2023-04-07 | Stop reason: HOSPADM

## 2023-04-07 RX ORDER — LIDOCAINE HYDROCHLORIDE 20 MG/ML
INJECTION, SOLUTION INFILTRATION; PERINEURAL PRN
Status: DISCONTINUED | OUTPATIENT
Start: 2023-04-07 | End: 2023-04-07

## 2023-04-07 RX ORDER — ONDANSETRON 4 MG/1
4 TABLET, ORALLY DISINTEGRATING ORAL EVERY 30 MIN PRN
Status: DISCONTINUED | OUTPATIENT
Start: 2023-04-07 | End: 2023-04-07 | Stop reason: HOSPADM

## 2023-04-07 RX ORDER — LIDOCAINE 40 MG/G
CREAM TOPICAL
Status: DISCONTINUED | OUTPATIENT
Start: 2023-04-07 | End: 2023-04-08 | Stop reason: HOSPADM

## 2023-04-07 RX ORDER — ACETAMINOPHEN 325 MG/1
650 TABLET ORAL EVERY 4 HOURS PRN
Status: DISCONTINUED | OUTPATIENT
Start: 2023-04-10 | End: 2023-04-08 | Stop reason: HOSPADM

## 2023-04-07 RX ORDER — SODIUM CHLORIDE, SODIUM LACTATE, POTASSIUM CHLORIDE, CALCIUM CHLORIDE 600; 310; 30; 20 MG/100ML; MG/100ML; MG/100ML; MG/100ML
INJECTION, SOLUTION INTRAVENOUS CONTINUOUS
Status: DISCONTINUED | OUTPATIENT
Start: 2023-04-07 | End: 2023-04-07 | Stop reason: HOSPADM

## 2023-04-07 RX ORDER — OXYCODONE HYDROCHLORIDE 10 MG/1
10 TABLET ORAL
Status: DISCONTINUED | OUTPATIENT
Start: 2023-04-07 | End: 2023-04-07 | Stop reason: HOSPADM

## 2023-04-07 RX ORDER — FENTANYL CITRATE 50 UG/ML
25 INJECTION, SOLUTION INTRAMUSCULAR; INTRAVENOUS EVERY 5 MIN PRN
Status: DISCONTINUED | OUTPATIENT
Start: 2023-04-07 | End: 2023-04-07 | Stop reason: HOSPADM

## 2023-04-07 RX ORDER — ONDANSETRON 2 MG/ML
INJECTION INTRAMUSCULAR; INTRAVENOUS PRN
Status: DISCONTINUED | OUTPATIENT
Start: 2023-04-07 | End: 2023-04-07

## 2023-04-07 RX ORDER — HYDROMORPHONE HYDROCHLORIDE 1 MG/ML
0.4 INJECTION, SOLUTION INTRAMUSCULAR; INTRAVENOUS; SUBCUTANEOUS
Status: DISCONTINUED | OUTPATIENT
Start: 2023-04-07 | End: 2023-04-08 | Stop reason: HOSPADM

## 2023-04-07 RX ORDER — SIMETHICONE 80 MG
80 TABLET,CHEWABLE ORAL EVERY 6 HOURS PRN
Status: DISCONTINUED | OUTPATIENT
Start: 2023-04-07 | End: 2023-04-08 | Stop reason: HOSPADM

## 2023-04-07 RX ORDER — HYDROXYZINE HYDROCHLORIDE 10 MG/1
10 TABLET, FILM COATED ORAL EVERY 6 HOURS PRN
Status: DISCONTINUED | OUTPATIENT
Start: 2023-04-07 | End: 2023-04-08 | Stop reason: HOSPADM

## 2023-04-07 RX ORDER — NALOXONE HYDROCHLORIDE 0.4 MG/ML
0.2 INJECTION, SOLUTION INTRAMUSCULAR; INTRAVENOUS; SUBCUTANEOUS
Status: DISCONTINUED | OUTPATIENT
Start: 2023-04-07 | End: 2023-04-08 | Stop reason: HOSPADM

## 2023-04-07 RX ORDER — PROPOFOL 10 MG/ML
INJECTION, EMULSION INTRAVENOUS PRN
Status: DISCONTINUED | OUTPATIENT
Start: 2023-04-07 | End: 2023-04-07

## 2023-04-07 RX ORDER — HYDROMORPHONE HYDROCHLORIDE 1 MG/ML
0.2 INJECTION, SOLUTION INTRAMUSCULAR; INTRAVENOUS; SUBCUTANEOUS EVERY 5 MIN PRN
Status: DISCONTINUED | OUTPATIENT
Start: 2023-04-07 | End: 2023-04-07 | Stop reason: HOSPADM

## 2023-04-07 RX ORDER — CALCIUM CARBONATE 500 MG/1
500 TABLET, CHEWABLE ORAL DAILY PRN
Status: DISCONTINUED | OUTPATIENT
Start: 2023-04-07 | End: 2023-04-08 | Stop reason: HOSPADM

## 2023-04-07 RX ORDER — PROCHLORPERAZINE MALEATE 5 MG
5 TABLET ORAL EVERY 6 HOURS PRN
Status: DISCONTINUED | OUTPATIENT
Start: 2023-04-07 | End: 2023-04-08 | Stop reason: HOSPADM

## 2023-04-07 RX ORDER — NALOXONE HYDROCHLORIDE 0.4 MG/ML
0.4 INJECTION, SOLUTION INTRAMUSCULAR; INTRAVENOUS; SUBCUTANEOUS
Status: DISCONTINUED | OUTPATIENT
Start: 2023-04-07 | End: 2023-04-08 | Stop reason: HOSPADM

## 2023-04-07 RX ORDER — SODIUM CHLORIDE, SODIUM LACTATE, POTASSIUM CHLORIDE, CALCIUM CHLORIDE 600; 310; 30; 20 MG/100ML; MG/100ML; MG/100ML; MG/100ML
INJECTION, SOLUTION INTRAVENOUS CONTINUOUS PRN
Status: DISCONTINUED | OUTPATIENT
Start: 2023-04-07 | End: 2023-04-07

## 2023-04-07 RX ORDER — HYDROMORPHONE HYDROCHLORIDE 1 MG/ML
0.2 INJECTION, SOLUTION INTRAMUSCULAR; INTRAVENOUS; SUBCUTANEOUS
Status: DISCONTINUED | OUTPATIENT
Start: 2023-04-07 | End: 2023-04-08 | Stop reason: HOSPADM

## 2023-04-07 RX ORDER — CEFAZOLIN SODIUM 1 G/3ML
INJECTION, POWDER, FOR SOLUTION INTRAMUSCULAR; INTRAVENOUS PRN
Status: DISCONTINUED | OUTPATIENT
Start: 2023-04-07 | End: 2023-04-07

## 2023-04-07 RX ORDER — ONDANSETRON 4 MG/1
4 TABLET, ORALLY DISINTEGRATING ORAL EVERY 6 HOURS PRN
Status: DISCONTINUED | OUTPATIENT
Start: 2023-04-07 | End: 2023-04-08 | Stop reason: HOSPADM

## 2023-04-07 RX ORDER — OXYCODONE HYDROCHLORIDE 5 MG/1
5 TABLET ORAL EVERY 6 HOURS PRN
Qty: 12 TABLET | Refills: 0 | Status: SHIPPED | OUTPATIENT
Start: 2023-04-07 | End: 2023-04-10

## 2023-04-07 RX ORDER — ONDANSETRON 2 MG/ML
4 INJECTION INTRAMUSCULAR; INTRAVENOUS EVERY 30 MIN PRN
Status: DISCONTINUED | OUTPATIENT
Start: 2023-04-07 | End: 2023-04-07 | Stop reason: HOSPADM

## 2023-04-07 RX ORDER — KETAMINE HYDROCHLORIDE 10 MG/ML
INJECTION INTRAMUSCULAR; INTRAVENOUS PRN
Status: DISCONTINUED | OUTPATIENT
Start: 2023-04-07 | End: 2023-04-07

## 2023-04-07 RX ORDER — FENTANYL CITRATE 50 UG/ML
INJECTION, SOLUTION INTRAMUSCULAR; INTRAVENOUS PRN
Status: DISCONTINUED | OUTPATIENT
Start: 2023-04-07 | End: 2023-04-07

## 2023-04-07 RX ORDER — DEXAMETHASONE SODIUM PHOSPHATE 4 MG/ML
INJECTION, SOLUTION INTRA-ARTICULAR; INTRALESIONAL; INTRAMUSCULAR; INTRAVENOUS; SOFT TISSUE PRN
Status: DISCONTINUED | OUTPATIENT
Start: 2023-04-07 | End: 2023-04-07

## 2023-04-07 RX ORDER — HYDROMORPHONE HYDROCHLORIDE 1 MG/ML
0.4 INJECTION, SOLUTION INTRAMUSCULAR; INTRAVENOUS; SUBCUTANEOUS EVERY 5 MIN PRN
Status: DISCONTINUED | OUTPATIENT
Start: 2023-04-07 | End: 2023-04-07 | Stop reason: HOSPADM

## 2023-04-07 RX ORDER — CEFAZOLIN SODIUM/WATER 2 G/20 ML
2 SYRINGE (ML) INTRAVENOUS SEE ADMIN INSTRUCTIONS
Status: DISCONTINUED | OUTPATIENT
Start: 2023-04-07 | End: 2023-04-07 | Stop reason: HOSPADM

## 2023-04-07 RX ORDER — FENTANYL CITRATE 50 UG/ML
50 INJECTION, SOLUTION INTRAMUSCULAR; INTRAVENOUS EVERY 5 MIN PRN
Status: DISCONTINUED | OUTPATIENT
Start: 2023-04-07 | End: 2023-04-07 | Stop reason: HOSPADM

## 2023-04-07 RX ORDER — OXYCODONE HYDROCHLORIDE 10 MG/1
10 TABLET ORAL EVERY 4 HOURS PRN
Status: DISCONTINUED | OUTPATIENT
Start: 2023-04-07 | End: 2023-04-08 | Stop reason: HOSPADM

## 2023-04-07 RX ORDER — CEFAZOLIN SODIUM/WATER 2 G/20 ML
2 SYRINGE (ML) INTRAVENOUS
Status: COMPLETED | OUTPATIENT
Start: 2023-04-07 | End: 2023-04-07

## 2023-04-07 RX ORDER — BISACODYL 10 MG
10 SUPPOSITORY, RECTAL RECTAL DAILY PRN
Status: DISCONTINUED | OUTPATIENT
Start: 2023-04-07 | End: 2023-04-08 | Stop reason: HOSPADM

## 2023-04-07 RX ORDER — ACETAMINOPHEN 325 MG/1
975 TABLET ORAL EVERY 8 HOURS
Status: DISCONTINUED | OUTPATIENT
Start: 2023-04-07 | End: 2023-04-08 | Stop reason: HOSPADM

## 2023-04-07 RX ORDER — OXYCODONE HYDROCHLORIDE 5 MG/1
5 TABLET ORAL
Status: DISCONTINUED | OUTPATIENT
Start: 2023-04-07 | End: 2023-04-07 | Stop reason: HOSPADM

## 2023-04-07 RX ORDER — MAGNESIUM CARB/ALUMINUM HYDROX 105-160MG
30 TABLET,CHEWABLE ORAL DAILY PRN
Status: DISCONTINUED | OUTPATIENT
Start: 2023-04-07 | End: 2023-04-08 | Stop reason: HOSPADM

## 2023-04-07 RX ORDER — AMOXICILLIN 250 MG
2 CAPSULE ORAL 2 TIMES DAILY
Qty: 40 TABLET | Refills: 0 | Status: SHIPPED | OUTPATIENT
Start: 2023-04-07 | End: 2023-04-17

## 2023-04-07 RX ORDER — OXYCODONE HYDROCHLORIDE 5 MG/1
5 TABLET ORAL EVERY 4 HOURS PRN
Status: DISCONTINUED | OUTPATIENT
Start: 2023-04-07 | End: 2023-04-08 | Stop reason: HOSPADM

## 2023-04-07 RX ORDER — EPHEDRINE SULFATE 50 MG/ML
INJECTION, SOLUTION INTRAMUSCULAR; INTRAVENOUS; SUBCUTANEOUS PRN
Status: DISCONTINUED | OUTPATIENT
Start: 2023-04-07 | End: 2023-04-07

## 2023-04-07 RX ORDER — ATORVASTATIN CALCIUM 20 MG/1
20 TABLET, FILM COATED ORAL AT BEDTIME
Status: DISCONTINUED | OUTPATIENT
Start: 2023-04-07 | End: 2023-04-08 | Stop reason: HOSPADM

## 2023-04-07 RX ORDER — NEOMYCIN/BACITRACIN/POLYMYXINB 3.5-400-5K
OINTMENT (GRAM) TOPICAL 4 TIMES DAILY PRN
Status: DISCONTINUED | OUTPATIENT
Start: 2023-04-07 | End: 2023-04-08 | Stop reason: HOSPADM

## 2023-04-07 RX ORDER — ONDANSETRON 2 MG/ML
4 INJECTION INTRAMUSCULAR; INTRAVENOUS EVERY 6 HOURS PRN
Status: DISCONTINUED | OUTPATIENT
Start: 2023-04-07 | End: 2023-04-08 | Stop reason: HOSPADM

## 2023-04-07 RX ORDER — KETOROLAC TROMETHAMINE 30 MG/ML
15 INJECTION, SOLUTION INTRAMUSCULAR; INTRAVENOUS
Status: DISCONTINUED | OUTPATIENT
Start: 2023-04-07 | End: 2023-04-07 | Stop reason: HOSPADM

## 2023-04-07 RX ORDER — ACETAMINOPHEN 325 MG/1
325-650 TABLET ORAL EVERY 6 HOURS PRN
Qty: 100 TABLET | Refills: 0 | Status: SHIPPED | OUTPATIENT
Start: 2023-04-07

## 2023-04-07 RX ORDER — SODIUM CHLORIDE 9 MG/ML
INJECTION, SOLUTION INTRAVENOUS CONTINUOUS
Status: ACTIVE | OUTPATIENT
Start: 2023-04-07 | End: 2023-04-08

## 2023-04-07 RX ADMIN — CEFAZOLIN 2 G: 1 INJECTION, POWDER, FOR SOLUTION INTRAMUSCULAR; INTRAVENOUS at 18:26

## 2023-04-07 RX ADMIN — HYDROMORPHONE HYDROCHLORIDE 0.5 MG: 1 INJECTION, SOLUTION INTRAMUSCULAR; INTRAVENOUS; SUBCUTANEOUS at 17:29

## 2023-04-07 RX ADMIN — Medication 20 MG: at 16:30

## 2023-04-07 RX ADMIN — ONDANSETRON 4 MG: 2 INJECTION INTRAMUSCULAR; INTRAVENOUS at 20:10

## 2023-04-07 RX ADMIN — Medication 10 MG: at 16:31

## 2023-04-07 RX ADMIN — Medication 10 MG: at 18:55

## 2023-04-07 RX ADMIN — Medication 50 MG: at 14:13

## 2023-04-07 RX ADMIN — Medication 10 MG: at 18:25

## 2023-04-07 RX ADMIN — FENTANYL CITRATE 25 MCG: 50 INJECTION, SOLUTION INTRAMUSCULAR; INTRAVENOUS at 15:10

## 2023-04-07 RX ADMIN — Medication 10 MG: at 16:03

## 2023-04-07 RX ADMIN — Medication 2 G: at 14:30

## 2023-04-07 RX ADMIN — HYDROMORPHONE HYDROCHLORIDE 0.25 MG: 1 INJECTION, SOLUTION INTRAMUSCULAR; INTRAVENOUS; SUBCUTANEOUS at 19:44

## 2023-04-07 RX ADMIN — FENTANYL CITRATE 25 MCG: 50 INJECTION, SOLUTION INTRAMUSCULAR; INTRAVENOUS at 20:30

## 2023-04-07 RX ADMIN — FENTANYL CITRATE 25 MCG: 50 INJECTION, SOLUTION INTRAMUSCULAR; INTRAVENOUS at 19:30

## 2023-04-07 RX ADMIN — HYDROMORPHONE HYDROCHLORIDE 0.25 MG: 1 INJECTION, SOLUTION INTRAMUSCULAR; INTRAVENOUS; SUBCUTANEOUS at 18:51

## 2023-04-07 RX ADMIN — PHENYLEPHRINE HYDROCHLORIDE 100 MCG: 10 INJECTION INTRAVENOUS at 15:03

## 2023-04-07 RX ADMIN — Medication 10 MG: at 17:55

## 2023-04-07 RX ADMIN — LIDOCAINE HYDROCHLORIDE 40 MG: 20 INJECTION, SOLUTION INFILTRATION; PERINEURAL at 14:13

## 2023-04-07 RX ADMIN — SODIUM CHLORIDE, POTASSIUM CHLORIDE, SODIUM LACTATE AND CALCIUM CHLORIDE: 600; 310; 30; 20 INJECTION, SOLUTION INTRAVENOUS at 14:09

## 2023-04-07 RX ADMIN — Medication 30 MG: at 14:13

## 2023-04-07 RX ADMIN — Medication 20 MG: at 15:32

## 2023-04-07 RX ADMIN — Medication 10 MG: at 14:53

## 2023-04-07 RX ADMIN — Medication 10 MG: at 15:01

## 2023-04-07 RX ADMIN — ACETAMINOPHEN 975 MG: 325 TABLET, FILM COATED ORAL at 21:28

## 2023-04-07 RX ADMIN — SODIUM CHLORIDE, POTASSIUM CHLORIDE, SODIUM LACTATE AND CALCIUM CHLORIDE: 600; 310; 30; 20 INJECTION, SOLUTION INTRAVENOUS at 15:36

## 2023-04-07 RX ADMIN — SUGAMMADEX 400 MG: 100 INJECTION, SOLUTION INTRAVENOUS at 20:39

## 2023-04-07 RX ADMIN — Medication 10 MG: at 15:32

## 2023-04-07 RX ADMIN — Medication 20 MG: at 17:10

## 2023-04-07 RX ADMIN — OXYCODONE HYDROCHLORIDE 5 MG: 5 TABLET ORAL at 21:28

## 2023-04-07 RX ADMIN — FENTANYL CITRATE 50 MCG: 50 INJECTION, SOLUTION INTRAMUSCULAR; INTRAVENOUS at 14:09

## 2023-04-07 RX ADMIN — Medication 10 MG: at 19:30

## 2023-04-07 RX ADMIN — DEXAMETHASONE SODIUM PHOSPHATE 8 MG: 4 INJECTION, SOLUTION INTRA-ARTICULAR; INTRALESIONAL; INTRAMUSCULAR; INTRAVENOUS; SOFT TISSUE at 15:15

## 2023-04-07 RX ADMIN — SODIUM CHLORIDE, POTASSIUM CHLORIDE, SODIUM LACTATE AND CALCIUM CHLORIDE: 600; 310; 30; 20 INJECTION, SOLUTION INTRAVENOUS at 20:40

## 2023-04-07 RX ADMIN — PROPOFOL 120 MG: 10 INJECTION, EMULSION INTRAVENOUS at 14:13

## 2023-04-07 ASSESSMENT — ACTIVITIES OF DAILY LIVING (ADL)
ADLS_ACUITY_SCORE: 20
ADLS_ACUITY_SCORE: 35
ADLS_ACUITY_SCORE: 20

## 2023-04-07 NOTE — ANESTHESIA PREPROCEDURE EVALUATION
Anesthesia Pre-Procedure Evaluation    Patient: Filemon Modi   MRN: [id number] : 1945        Procedure : Procedure(s):  PROSTATECTOMY, ROBOT-ASSISTED, USING DA CARMEN XI, WITH PELVIC LYMPHADENECTOMY          Past Medical History:   Diagnosis Date     Bilateral cataracts      Malignant neoplasm (H)     colon     Prostate cancer (H)      Thyroid disease       Past Surgical History:   Procedure Laterality Date     ABDOMEN SURGERY      bowel resection     APPENDECTOMY       COLONOSCOPY  2018     EYE SURGERY  2011    retinal detachment repair with scleral buckle     EYE SURGERY  2002    retinal reattachment for LE     HERNIA REPAIR      x2     PHACOEMULSIFICATION WITH STANDARD INTRAOCULAR LENS IMPLANT Left 2019    Procedure: Left Eye Cataract Removal with Intraocular Lens Implant;  Surgeon: Ale Herndon MD;  Location: [facility] OR     PHACOEMULSIFICATION WITH STANDARD INTRAOCULAR LENS IMPLANT Right 2019    Procedure: Right Eye Cataract Removal with Intraocular Lens Implant;  Surgeon: Ale Herndon MD;  Location: [facility] OR     RETINAL REATTACHMENT      x2      No Known Allergies   Social History     Tobacco Use     Smoking status: Never     Smokeless tobacco: Never   Vaping Use     Vaping status: Not on file   Substance Use Topics     Alcohol use: Yes     Alcohol/week: 7.0 standard drinks of alcohol     Types: 7 Glasses of wine per week     Comment: wine daily      Wt Readings from Last 1 Encounters:   23 83.7 kg (184 lb 8.4 oz)        Anesthesia Evaluation   Pt has had prior anesthetic. Type: General.        ROS/MED HX  ENT/Pulmonary:  - neg pulmonary ROS     Neurologic:  - neg neurologic ROS     Cardiovascular:  - neg cardiovascular ROS     METS/Exercise Tolerance:     Hematologic:  - neg hematologic  ROS     Musculoskeletal:  - neg musculoskeletal ROS     GI/Hepatic:  - neg GI/hepatic ROS     Renal/Genitourinary:       Endo:     (+) thyroid problem,     Psychiatric/Substance Use:     (+)  Recreational drug usage: Cannabis.    Infectious Disease:       Malignancy:   (+) Malignancy,     Other:            Physical Exam    Airway  airway exam normal           Respiratory Devices and Support         Dental       (+) Modest Abnormalities - crowns, retainers, 1 or 2 missing teeth      Cardiovascular   cardiovascular exam normal          Pulmonary   pulmonary exam normal                OUTSIDE LABS:  CBC:   Lab Results   Component Value Date    WBC 5.3 03/24/2023    WBC 6.5 05/31/2011    HGB 16.1 03/24/2023    HGB 16.0 01/17/2019    HCT 48.6 03/24/2023    HCT 47.2 05/31/2011     03/24/2023     05/31/2011     BMP:   Lab Results   Component Value Date     03/24/2023     04/20/2006    POTASSIUM 4.2 03/24/2023    POTASSIUM 4.3 01/17/2019    CHLORIDE 107 03/24/2023    CHLORIDE 102 04/20/2006    CO2 28 03/24/2023    CO2 28 04/20/2006    BUN 13.7 03/24/2023    BUN 15 04/20/2006    CR 0.77 03/24/2023    CR 0.74 01/17/2019     (H) 04/07/2023     (H) 03/24/2023     COAGS:   Lab Results   Component Value Date    PTT 24 04/20/2006    INR 0.98 04/20/2006     POC: No results found for: BGM, HCG, HCGS  HEPATIC:   Lab Results   Component Value Date    ALBUMIN 4.4 04/20/2006    PROTTOTAL 7.0 04/20/2006    ALT 24 04/20/2006    AST 21 04/20/2006    ALKPHOS 74 04/20/2006    BILITOTAL 0.5 04/20/2006     OTHER:   Lab Results   Component Value Date    A1C 5.8 (H) 03/24/2023    BRYCE 9.6 03/24/2023    LIPASE 64 04/20/2006    AMYLASE 60 04/20/2006    SED 1 04/20/2006       Anesthesia Plan    ASA Status:  2   NPO Status:  NPO Appropriate    Anesthesia Type: General.     - Airway: ETT   Induction: Intravenous.   Maintenance: Balanced.   Techniques and Equipment:     - Lines/Monitors: 2nd IV     Consents    Anesthesia Plan(s) and associated risks, benefits, and realistic alternatives discussed. Questions answered and patient/representative(s) expressed understanding.     - Discussed: Risks,  Benefits and Alternatives for BOTH SEDATION and the PROCEDURE were discussed     - Discussed with:  Patient         Postoperative Care    Pain management: Oral pain medications, Multi-modal analgesia.   PONV prophylaxis: Ondansetron (or other 5HT-3), Dexamethasone or Solumedrol     Comments:                Stiven Orellana MD, MD

## 2023-04-07 NOTE — ANESTHESIA PROCEDURE NOTES
Airway       Patient location during procedure: OR       Procedure Start/Stop Times: 4/7/2023 2:17 PM  Staff -        CRNA: Michelle Addison APRN CRNA       Performed By: CRNA  Consent for Airway        Urgency: elective  Indications and Patient Condition       Indications for airway management: naga-procedural       Induction type:intravenous       Mask difficulty assessment: 1 - vent by mask    Final Airway Details       Final airway type: endotracheal airway       Successful airway: ETT - single  Endotracheal Airway Details        ETT size (mm): 7.5       Cuffed: yes       Successful intubation technique: direct laryngoscopy       DL Blade Type: Haddad 2       Grade View of Cords: 1       Adjucts: stylet       Position: Right       Measured from: gums/teeth       Secured at (cm): 24    Post intubation assessment        Placement verified by: capnometry, equal breath sounds and chest rise        Number of attempts at approach: 1       Secured with: silk tape       Ease of procedure: easy       Dentition: Intact and Unchanged    Medication(s) Administered   Medication Administration Time: 4/7/2023 2:17 PM

## 2023-04-08 VITALS
RESPIRATION RATE: 16 BRPM | HEIGHT: 75 IN | BODY MASS INDEX: 22.94 KG/M2 | HEART RATE: 69 BPM | TEMPERATURE: 98.8 F | OXYGEN SATURATION: 94 % | DIASTOLIC BLOOD PRESSURE: 69 MMHG | SYSTOLIC BLOOD PRESSURE: 116 MMHG | WEIGHT: 184.53 LBS

## 2023-04-08 LAB
ANION GAP SERPL CALCULATED.3IONS-SCNC: 8 MMOL/L (ref 7–15)
BUN SERPL-MCNC: 14.1 MG/DL (ref 8–23)
CALCIUM SERPL-MCNC: 8.6 MG/DL (ref 8.8–10.2)
CHLORIDE SERPL-SCNC: 104 MMOL/L (ref 98–107)
CREAT FLD-MCNC: 0.9 MG/DL
CREAT SERPL-MCNC: 0.83 MG/DL (ref 0.67–1.17)
CREATININE BODY FLUID SOURCE: NORMAL
DEPRECATED HCO3 PLAS-SCNC: 27 MMOL/L (ref 22–29)
ERYTHROCYTE [DISTWIDTH] IN BLOOD BY AUTOMATED COUNT: 12.2 % (ref 10–15)
GFR SERPL CREATININE-BSD FRML MDRD: 90 ML/MIN/1.73M2
GLUCOSE BLDC GLUCOMTR-MCNC: 144 MG/DL (ref 70–99)
GLUCOSE SERPL-MCNC: 140 MG/DL (ref 70–99)
HCT VFR BLD AUTO: 40.2 % (ref 40–53)
HGB BLD-MCNC: 13.2 G/DL (ref 13.3–17.7)
MCH RBC QN AUTO: 29.7 PG (ref 26.5–33)
MCHC RBC AUTO-ENTMCNC: 32.8 G/DL (ref 31.5–36.5)
MCV RBC AUTO: 91 FL (ref 78–100)
PLATELET # BLD AUTO: 210 10E3/UL (ref 150–450)
POTASSIUM SERPL-SCNC: 4.7 MMOL/L (ref 3.4–5.3)
RBC # BLD AUTO: 4.44 10E6/UL (ref 4.4–5.9)
SODIUM SERPL-SCNC: 139 MMOL/L (ref 136–145)
WBC # BLD AUTO: 10.3 10E3/UL (ref 4–11)

## 2023-04-08 PROCEDURE — 250N000013 HC RX MED GY IP 250 OP 250 PS 637: Performed by: STUDENT IN AN ORGANIZED HEALTH CARE EDUCATION/TRAINING PROGRAM

## 2023-04-08 PROCEDURE — 36415 COLL VENOUS BLD VENIPUNCTURE: CPT | Performed by: STUDENT IN AN ORGANIZED HEALTH CARE EDUCATION/TRAINING PROGRAM

## 2023-04-08 PROCEDURE — 80048 BASIC METABOLIC PNL TOTAL CA: CPT | Performed by: STUDENT IN AN ORGANIZED HEALTH CARE EDUCATION/TRAINING PROGRAM

## 2023-04-08 PROCEDURE — 82570 ASSAY OF URINE CREATININE: CPT | Performed by: STUDENT IN AN ORGANIZED HEALTH CARE EDUCATION/TRAINING PROGRAM

## 2023-04-08 PROCEDURE — 258N000003 HC RX IP 258 OP 636: Performed by: STUDENT IN AN ORGANIZED HEALTH CARE EDUCATION/TRAINING PROGRAM

## 2023-04-08 PROCEDURE — 82962 GLUCOSE BLOOD TEST: CPT

## 2023-04-08 PROCEDURE — 85027 COMPLETE CBC AUTOMATED: CPT | Performed by: STUDENT IN AN ORGANIZED HEALTH CARE EDUCATION/TRAINING PROGRAM

## 2023-04-08 RX ADMIN — SODIUM CHLORIDE: 9 INJECTION, SOLUTION INTRAVENOUS at 01:14

## 2023-04-08 RX ADMIN — OXYCODONE HYDROCHLORIDE 10 MG: 10 TABLET ORAL at 13:25

## 2023-04-08 RX ADMIN — OXYCODONE HYDROCHLORIDE 10 MG: 10 TABLET ORAL at 09:11

## 2023-04-08 RX ADMIN — SIMETHICONE 80 MG: 80 TABLET, CHEWABLE ORAL at 01:08

## 2023-04-08 RX ADMIN — ACETAMINOPHEN 975 MG: 325 TABLET, FILM COATED ORAL at 12:53

## 2023-04-08 RX ADMIN — OXYCODONE HYDROCHLORIDE 5 MG: 5 TABLET ORAL at 01:05

## 2023-04-08 RX ADMIN — LEVOTHYROXINE SODIUM 125 MCG: 25 TABLET ORAL at 08:07

## 2023-04-08 RX ADMIN — OXYCODONE HYDROCHLORIDE 10 MG: 10 TABLET ORAL at 04:50

## 2023-04-08 RX ADMIN — ATORVASTATIN CALCIUM 20 MG: 20 TABLET, FILM COATED ORAL at 00:22

## 2023-04-08 RX ADMIN — ACETAMINOPHEN 975 MG: 325 TABLET, FILM COATED ORAL at 05:25

## 2023-04-08 ASSESSMENT — ACTIVITIES OF DAILY LIVING (ADL)
ADLS_ACUITY_SCORE: 26
ADLS_ACUITY_SCORE: 20
ADLS_ACUITY_SCORE: 20
ADLS_ACUITY_SCORE: 26
ADLS_ACUITY_SCORE: 20
ADLS_ACUITY_SCORE: 26

## 2023-04-08 NOTE — OR NURSING
PACU to Inpatient Nursing Handoff    Patient Filemon Modi is a 77 year old male who speaks English.   Procedure Procedure(s):  Adhesiolysis, Robotic Assisted Prostatectomy with Pelvic Lymphadenectomy, Bilateral, and Robotic Cystolithotomy   Surgeon(s) Primary: Royal Escobedo MD  Resident - Assisting: Stiven Brar MD; Gene Kerr MD; Adalberto Hernandez MD     No Known Allergies    Isolation  [unfilled]     Past Medical History   has a past medical history of Bilateral cataracts, Malignant neoplasm (H), Prostate cancer (H), and Thyroid disease.    Anesthesia General   Dermatome Level     Preop Meds Not applicable   Nerve block Not applicable   Intraop Meds dexamethasone (Decadron)  fentanyl (Sublimaze): 125 mcg total  hydromorphone (Dilaudid): 1 mg total  ketamine (Ketalar): 50 mg given  ondansetron (Zofran): last given at 2010   Local Meds Yes   Antibiotics cefazolin (Ancef) - last given at 1826     Pain Patient Currently in Pain: yes   PACU meds  acetaminophen (Tylenol): 975 mg (total dose) last given at 0928   oxycodone (Roxicodone): 5 mg (total dose) last given at 0928    PCA / epidural No   Capnography     Telemetry ECG Rhythm: Sinus rhythm   Inpatient Telemetry Monitor Ordered? No        Labs Glucose Lab Results   Component Value Date     04/07/2023     04/20/2006       Hgb Lab Results   Component Value Date    HGB 14.6 04/07/2023    HGB 16.0 01/17/2019       INR Lab Results   Component Value Date    INR 0.98 04/20/2006      PACU Imaging Not applicable     Wound/Incision Incision/Surgical Site 04/07/23 Mid;Upper Abdomen (Active)   Incision Assessment WDL 04/07/23 2049   Closure Liquid bandage 04/07/23 2049   Incision Drainage Amount None 04/07/23 2049   Dressing Intervention Open to air / No Dressing 04/07/23 2049   Number of days: 0       Incision/Surgical Site 04/07/23 Right;Upper Abdomen (Active)   Incision Assessment WDL 04/07/23 2049   Closure Liquid bandage 04/07/23 2049    Incision Drainage Amount None 04/07/23 2049   Dressing Intervention Open to air / No Dressing 04/07/23 2049   Number of days: 0       Incision/Surgical Site 04/07/23 Lateral;Right Abdomen (Active)   Incision Assessment UTV 04/07/23 2049   Closure Sutures;Liquid bandage 04/07/23 2049   Incision Drainage Amount UTV 04/07/23 2049   Dressing Intervention Clean, dry, intact 04/07/23 2049   Number of days: 0       Incision/Surgical Site 04/07/23 Left;Lateral Abdomen (Active)   Incision Assessment L 04/07/23 2049   Closure Liquid bandage 04/07/23 2049   Incision Drainage Amount None 04/07/23 2049   Dressing Intervention Open to air / No Dressing 04/07/23 2049   Number of days: 0       Incision/Surgical Site 04/07/23 Left;Upper;Quadrant Abdomen (Active)   Incision Assessment [facility] 04/07/23 2049   Closure Liquid bandage 04/07/23 2049   Incision Drainage Amount None 04/07/23 2049   Dressing Intervention Open to air / No Dressing 04/07/23 2049   Number of days: 0      CMS        Equipment Not applicable   Other LDA       IV Access Peripheral IV 01/17/23 Anterior;Left Upper forearm (Active)   Number of days: 80       Peripheral IV 04/07/23 Left Lower forearm (Active)   Site Assessment [facility] 04/07/23 2049   Line Status Saline locked 04/07/23 2049   Dressing Transparent 04/07/23 2049   Dressing Status clean;dry;intact 04/07/23 2049   Dressing Intervention New dressing  04/07/23 1324   Line Intervention Flushed 04/07/23 1324   Phlebitis Scale 0-->no symptoms 04/07/23 2049   Number of days: 0       Peripheral IV 04/07/23 Right Lower forearm (Active)   Site Assessment [facility] 04/07/23 2049   Line Status Saline locked 04/07/23 2049   Dressing Transparent 04/07/23 2049   Dressing Status clean;dry;intact 04/07/23 2049   Phlebitis Scale 0-->no symptoms 04/07/23 2049   Number of days: 0      Blood Products Not applicable    mL   Intake/Output Date 04/07/23 0700 - 04/08/23 0659   Shift 6213-6554 9091-0860 2307-5914 24 Hour Total    INTAKE   I.V. 500 2300  2800   IV Piggyback  20  20   Shift Total(mL/kg) 500(5.97) 2320(27.72)  2820(33.69)   OUTPUT   Urine  300  300   Drains  175  175   Blood  110  110   Shift Total(mL/kg)  585(6.99)  585(6.99)   Weight (kg) 83.7 83.7 83.7 83.7      Drains / Morales Closed/Suction Drain 1 Right;Lateral Abdomen Bulb 19 Maltese (Active)   Site Description UTV 04/07/23 2049   Dressing Status Normal: Clean, Dry & Intact 04/07/23 2049   Drainage Appearance Bloody/Bright Red 04/07/23 2049   Status To bulb suction 04/07/23 2049   Output (ml) 175 ml 04/07/23 2120   Number of days: 0       Urethral Catheter 04/07/23 Non-latex;Straight-tip 20 fr (Active)   Collection Container Standard 04/07/23 2049   Securement Method Tape 04/07/23 2049   Rationale for Continued Use /GI/GYN Pelvic Procedure 04/07/23 2049   Number of days: 0      Time of void PreOp Time of Void Prior to Procedure: 1315 (04/07/23 1340)    PostOp      Diapered? No   Bladder Scan     PO    tolerating sips     Vitals    B/P: 106/72  T: 97.7  F (36.5  C)    Temp src: Axillary  P:  Pulse: 73 (04/07/23 2130)          R: 13  O2:  SpO2: 95 %    O2 Device: None (Room air) (04/07/23 2115)    Oxygen Delivery: 6 LPM (04/07/23 2100)         Family/support present Brother- Roland   Patient belongings     Patient transported on cart and air mat   DC meds/scripts (obs/outpt) Yes, meds   Inpatient Pain Meds Released? Yes       Special needs/considerations Tacho CBC and BMP in PACU   Tasks needing completion None          Daniella Jimenes, RN  [facility] [id number]

## 2023-04-08 NOTE — PLAN OF CARE
Goal Outcome Evaluation:         Pt A&Ox4 this shift. Pt denies chest pain, SOB, N/V, N/T. Pt states pain 6-7/10. Manged with Oxy, and tylenol. Pt states pain in right shoulder, applied heat packs, pt states relief. Pt stated gas pains, gave PRN simethicone, pt states some relief. BONILLA had 30 mL output this shift. Pt plaza in place with 550 mL. Pt has some drainage on dressing. Pt able to make needs known. Continue POC.

## 2023-04-08 NOTE — OP NOTE
PREOPERATIVE DIAGNOSIS:  Prostate cancer.     POSTOPERATIVE DIAGNOSIS:  Prostate cancer.     PROCEDURE:  Lysis of adhesions, Robotic-assisted laparoscopic radical prostatectomy, bilateral pelvic lymphadenectomy, robotic cystolithotomy.  Modifier 22 due to previous surgery and median lobe     INDICATIONS FOR PROCEDURE:  Mr. Modi is a 77-year-old gentleman followed in our clinic for a history of prostate cancer.  The patient presented today for robotic-assisted laparoscopic radical prostatectomy and pelvic lymphadenectomy, robotic cystolithotomy.     DESCRIPTION OF PROCEDURE:  After informed consent was obtained, the patient was brought to the operating room where he was administered general endotracheal tube anesthesia.  After suitable level of anesthesia was obtained, he was placed in supine position with all pressure points padded.  He was administered preoperative antibiotics.  He was prepped and draped in standard sterile fashion.  Next, the patient's abdomen was entered superior to the umbilicus with a Veress needle.  After confirmatory drop test, the patient's abdomen was insufflated to 15 cm with a water pressure of CO2.  We then placed our first port in the supraumbilical location and subsequent inspection demonstrated no injury from placement of Veress needle or the port, however significant adhesions were noted from the patient's previous surgery.  Using laparoscopic instruments, we took down the adhesions.  This required about 45 minutes of time.  We then placed our remaining ports under direct vision in an inverted umbrella in standard fashion.  Next, the patient was placed in Trendelenburg and the robot was docked.  We took down adhesions of the left colon from the sidewall.  We then incised the posterior peritoneum and identified seminal vesicles and vasa deferentia bilaterally.  These were sequentially dissected out using clips and cutting.  We then opened up the Denonvilliers fascia between the  prostate and the rectum, developed the space between the prostate and the rectum down to the apex.  Two Surgicels were left in this location.  We then dropped the bladder by making parallel incisions lateral to the medial umbilical ligaments and connecting these cephalad.  We then developed the space of Retzius down to the pubic symphysis.  The prostate was then defatted.  The endopelvic fascia was then incised bilaterally and the pelvic floor muscles were carefully dissected off of the prostate.  The dorsal vein was controlled with a stitch of 0 Vicryl.  We then transected the anterior bladder neck until we entered the bladder.  Inspection demonstrated a large median lobe which required significant attention to separate the bladder from the prostate.  We then transected the posterior bladder neck and developed the plane between the prostate and the bladder until we came back through the defect in Denonvilliers fascia.  The seminal vesicles and vasa deferentia were then brought out through this location.  The Surgicels were then removed.  We then controlled the pedicles using clips and cutting and performed modified nerve sparing bilaterally.  Posterior attachments were taken down sharply.  We then transected the dorsal vein down to the level of the urethra, which was then largely cold cut.  We then transected the posterior striated sphincter fibers, resulting in the prostate being free and placed in left lower quadrant.  We then performed a bilateral pelvic lymphadenectomy in standard fashion.  We then established excellent hemostasis.  We then removed the bladder stone from the bladder.  We performed a bladder neck repair with interrupted stitches of 3-0 vicryl.  The anastomosis was then completed using 2 stitches of 3-0 Monocryl tied together in a standard running fashion.  A 20-St Lucian Morales catheter was placed into the patient's bladder and the balloon inflated with 13 mL of water.  Subsequent irrigation  demonstrated no visible leak.  We then placed the prostate in an EndoCatch bag and a 19-Chinese Fito drain was placed in the right lateral robotic port site and stitched into place with 2-0 nylon.  Next, the robot was undocked and the patient was brought out of Trendelenburg.  The supraumbilical port site was enlarged and the prostate brought out of this location.  We then closed the fascia here with interrupted stitches of 0 Vicryl in figure-of-eight fashion.  All skin was then closed with 4-0 Monocryl in a subcuticular fashion and dressed with skin glue.  The drain was connected to bulb suction.  The Morales catheter was secured to the patient's leg with heavy cloth tape and connected to gravity drainage.  The patient was then awakened, extubated, and brought to recovery room in stable condition.     SURGEON:  Royal Escobedo MD     ASSISTANT:  Dr. Stiven Brar, Dr. Gene Kerr     ESTIMATED BLOOD LOSS:  100 mL.     SPECIMENS:  Includes prostate, seminal vesicles and vasa deferentia, bilateral pelvic lymph nodes and bladder stone.     COMPLICATIONS:  There were no immediate complications noted.

## 2023-04-08 NOTE — PROGRESS NOTES
Pt. discharged at 1615 to home, and left with personal belongings. Pt. received complete discharge paperwork and oxycodone medications as filled by discharge pharmacy. Pt received and signed for the narcotic medication oxycodone. Pt. was given times of last dose for all discharge medications in writing on discharge medication sheets. Discharge teaching included medication, pain management, activity restrictions, dressing changes, and signs and symptoms of infection. Dressing supplies sent home. Tape education completed by resident. Pt. to follow up with urology. Pt. had no further questions at the time of discharge and no unmet needs were identified.

## 2023-04-08 NOTE — PROGRESS NOTES
"Urology  Progress Note    NAEO  Pain well controlled with oral and IV prns  Tolerating solid food - no n/v  Not passing gas  Not ambulating  Plaza in place    Exam  BP (!) 152/72   Pulse 68   Temp 98  F (36.7  C) (Oral)   Resp 16   Ht 1.892 m (6' 2.5\")   Wt 83.7 kg (184 lb 8.4 oz)   SpO2 90%   BMI 23.37 kg/m    No acute distress  Unlabored breathing  Abdomen soft,  appropriately tender, nondistended. Incisions c/d/i with glue  BONILLA serosanguinous  Plaza with clear yellow urine in tubing      /550  BONILLA --/30    Labs  Recent Labs   Lab Test 04/07/23  2104 03/24/23  0811 01/17/19  1556   WBC  --  5.3  --    HGB 14.6 16.1 16.0   CR 0.76 0.77 0.74        AM labs pending    Assessment/Plan  78 year old y/o male POD#1 s/p RALP for prostate cancer.     Neuro: Tylenol, prn oxy/dilaudid for pain control  CV: PTA statin  Pulm: IS while awake  FEN/GI: clear liquid diet, MIVF @ 100/hr. Bowel regimen.  Endo: RENNY  : plaza in place and taped to leg (to remain at discharge): BONILLA in place, BONILLA creatinine pending   Heme/ID: Hgb pending  Activity: Up ad eli  PPx: SCDs.   Dispo: med/surg; discharge today pending tolerating regular diet, ambulating, pain controlled on orals, BONILLA creatinine results return    Seen and examined with the chief resident and Dr. Ricci. Will discuss with Dr. Escobedo.    Gene Kerr MD  Urology Resident     Contacting the Urology Team     Please use the following job codes to reach the Urology Team. Note that you must use an in house phone and that job codes cannot receive text pages.     On weekdays, dial [phone] (or star-star-star 777 on the new [employer] telephones) then [phone] to reach the Adult Urology resident or PA on call    On weekdays, dial [phone] (or star-star-star 777 on the new [employer] telephones) then [phone] to reach the Pediatric Urology resident    On weeknights and weekends, dial [phone] (or star-star-star 777 on the new [employer] telephones) then [phone] to reach the Urology resident on call (for both Adult " and Pediatrics)

## 2023-04-08 NOTE — PLAN OF CARE
Patient arrived to unit from PACU around 2230. Patient resting comfortably in bed.   On capno. Bed alarm on for safety.     Report given to oncoming nurse. Continue with POC.

## 2023-04-08 NOTE — OR NURSING
Discharge medications, including oxycodone, sent with patient to the floor with the chart. Chain of custody form filled out and NA FlyerDavid, instructed to give them to Phoebe on arrival. Patient's brother transferring with him to the floor. He was brought to the bedside at 9:35.

## 2023-04-08 NOTE — PLAN OF CARE
Vital signs:  Temp: 98.8  F (37.1  C) Temp src: Oral BP: 116/69 Pulse: 69   Resp: 16 SpO2: 94 % Room Air      Neuro/CMS: A&Ox4, VSS, strong motor strength, denies N/T, N/V, chest pain and SOB.  Respiratory: Clear, equal bilaterally  Gastro/Genito: LBM prior to surgery, abdomen distended, audible bowl sounds- normoactive  Activity: Independent, walks in room frequently and walked hallway once this shift.  Skin/Dressings: Scab of forehead, 5 abdominal incisions [two bilateral, one about umbilicus], BONILLA drain removed today [covered w/ primapore dressing],plaza catheter in place by 2 in. Silk tape- Warlick tape method.  Lines/Drains/Airways: 2 PIV SL, Right and Left  Pain: Pain in bilateral shoulders, providers aware. Otherwise incisional pain and abdominal discomfort managed w/ scheduled Tylenol and PRN oxycodone.  Diet: Regular, pt tolerated it well  Plan: Plan to discharge today.

## 2023-04-08 NOTE — ANESTHESIA POSTPROCEDURE EVALUATION
Patient: Filemon Modi    Procedure: Procedure(s):  Adhesiolysis, Robotic Assisted Prostatectomy with Pelvic Lymphadenectomy, Bilateral, and Robotic Cystolithotomy       Anesthesia Type:  General    Note:  Disposition: Admission   Postop Pain Control: Uneventful            Sign Out: Well controlled pain   PONV: No   Neuro/Psych: Uneventful            Sign Out: Acceptable/Baseline neuro status   Airway/Respiratory: Uneventful            Sign Out: Acceptable/Baseline resp. status   CV/Hemodynamics: Uneventful            Sign Out: Acceptable CV status; No obvious hypovolemia; No obvious fluid overload   Other NRE:    DID A NON-ROUTINE EVENT OCCUR?     Event details/Postop Comments:  Doing well. Alert, oriented. No sore throat, nausea, or problems with pain control.  Patient denies any concerns.              Last vitals:  Vitals Value Taken Time   /72 04/07/23 2130   Temp 36.5  C (97.7  F) 04/07/23 2130   Pulse 76 04/07/23 2140   Resp 11 04/07/23 2140   SpO2 94 % 04/07/23 2140   Vitals shown include unvalidated device data.    Electronically Signed By: Marla Mir MD  April 7, 2023  9:41 PM

## 2023-04-08 NOTE — ANESTHESIA CARE TRANSFER NOTE
Patient: Filemon Modi    Procedure: Procedure(s):  Adhesiolysis, Robotic Assisted Prostatectomy with Pelvic Lymphadenectomy, Bilateral, and Robotic Cystolithotomy       Diagnosis: Prostate cancer (H) [C61]  Diagnosis Additional Information: No value filed.    Anesthesia Type:   General     Note:    Oropharynx: oropharynx clear of all foreign objects  Level of Consciousness: drowsy  Oxygen Supplementation: face mask  Level of Supplemental Oxygen (L/min / FiO2): 6  Independent Airway: airway patency satisfactory and stable  Dentition: dentition unchanged  Vital Signs Stable: post-procedure vital signs reviewed and stable  Report to RN Given: handoff report given  Patient transferred to: PACU    Handoff Report: Identifed the Patient, Identified the Reponsible Provider, Reviewed the pertinent medical history, Discussed the surgical course, Reviewed Intra-OP anesthesia mangement and issues during anesthesia, Set expectations for post-procedure period and Allowed opportunity for questions and acknowledgement of understanding      Vitals:  Vitals Value Taken Time   /81 04/07/23 2049   Temp     Pulse 90 04/07/23 2049   Resp     SpO2 95 % 04/07/23 2052   Vitals shown include unvalidated device data.    Electronically Signed By: WYATT Pulliam CRNA  April 7, 2023  8:53 PM

## 2023-04-09 NOTE — DISCHARGE SUMMARY
Discharge Summary     Filemon Modi MRN# [id number]   YOB: 1945 Age: 78 year old     Date of Admission:  4/7/2023  Date of Discharge::  4/8/2023  4:04 PM  Admitting Physician:  Royal Escobedo MD  Discharge Physician:  Gene Kerr MD  Primary Care Physician:         Joaquim Velez          Admission Diagnoses:   Prostate cancer (H) [C61]            Discharge Diagnosis:   Same as above           Procedures:   : Procedure(s):  Adhesiolysis, Robotic Assisted Prostatectomy with Pelvic Lymphadenectomy, Bilateral, and Robotic Cystolithotomy        Non-operative procedures:   None performed          Consultations:   None           Imaging Studies:     Results for orders placed or performed in visit on 01/17/23   MR Prostate wo & w Contrast    Addendum: 1/25/2023    Using an image analysis software package (Makara), three-dimensional  (3D) volumetric images of the prostate were reconstructed by a  radiologist and archived to PACS for prostate lesion analysis and  biopsy planning.  Additionally, the software package was utilized for  contrast kinetic analysis.    I have personally reviewed the examination and initial interpretation  and I agree with the findings.    BRODIE SYKES MD         SYSTEM ID:  [id number]      Narrative    MRI PROSTATE: 1/17/2023 12:48 PM    CLINICAL HISTORY: Prostate cancer (H)    Most Recent PSA: 7.33?High? ug/L 1/17/2023    Comparison: 6/15/2021.    TECHNIQUE:  The following sequences were obtained: High-resolution axial  T2-weighted, coronal T2-weighted, 3D volumetric T2-weighted, axial  pre-contrast T1, axial diffusion-weighted, axial apparent diffusion  coefficient and axial dynamic contrast-enhanced T1. Postcontrast  images were evaluated on a separate workstation to evaluate dynamic  contrast enhancement. The technique of this exam is PI-RADS v2.1  compliant. Contrast dose: 9 ML Gadavist    FINDINGS:    Size: 5.0 x 3.8 x 5.5 cm, 54  grams  Hemorrhage: Absent  Peripheral zone: Heterogeneous on T2-weighted images. Suspicious  lesions as detailed below.  Transition zone: Enlarged with BPH changes. Suspicious lesions as  detailed below.    Lesion(s) in rank order of severity (highest score- to lowest score,  then by size)     Lesion 1:    Location: Right mid gland peripheral and transition zone at the 10-12  o'clock position relative to the urethra. Series 7 image 55 and series  8, image 23.   Additional prostate regions involved: Anterior fibromuscular stroma  Size: 21 mm  T2 description: Homogenously T2 hypointense, no capsule  T2 numerical assessment: 5  DWI description: Focal markedly diffusion restricting/ADC hypointense  DWI numerical assessment: 5  DCE assessment: Negative, No enhancement above background    Prostate margin: Capsular abutment>15 mm with focal bulging  Lesion overall PI-RADS category: 5    Lesion 2:    Location: Right apex peripheral and transition zone at the 10 o'clock  position relative to the urethra. Series 7 image 68 and series 8,  image 24.   Additional prostate regions involved: None  Size: 11 x 8 x 7 mm  T2 description: Moderately T2 hypointense  T2 numerical assessment: 4  DWI description: Focal markedly diffusion restricting/ADC hypointense  DWI numerical assessment: 4  DCE assessment: Negative, No enhancement above background    Prostate margin: Capsular abutment 6-15 mm with smooth contour  Lesion overall PI-RADS category: 4    Lesion 3:  Location: Right mid gland peripheral zone at the 7 o'clock position  relative to the urethra. Series 7 image 57 and series 9, image 24 and  series 14, image 19.  Additional prostate regions involved: None  Size: 6 x 3 x 5 mm  T2 description: Moderately T2 hypointense  T2 numerical assessment: 4  DWI description: Focal diffusion restricting and ADC hypointense  DWI numerical assessment: 4  DCE assessment: Positive    Prostate margin: Capsular abutment<6 mm with smooth  contour  Lesion overall PI-RADS category: 4    Neurovascular bundles: No neurovascular bundle involvement by  malignancy.  Seminal vesicles: No seminal vesicle involvement by malignancy.  Lymph nodes: No lymph node involvement  Bones: Indeterminate lesions as follows: 11 mm T2 hypointense (series  7, image 24 and heterogenous enhancing observation in the left ischium  (series 19, image 30)  Other pelvic organs: Increased size of markedly T2 hypointense filling  defect in the right posterior dependent urinary bladder measuring 2.1  x 1.8 cm, previously 1.3 x 0.8 cm (series 6, image 12)        Impression    IMPRESSION:  1.  Based on the most suspicious abnormality, this exam is  characterized as PIRADS 5 - Clinically significant cancer is highly  likely to be present.  The most suspicious abnormality is stable  compared to 6/13/2021 and is located at the right mid gland peripheral  and transition zone and there is high suspicion of extraprostatic  extension.  2.  Additional lesion in the right apex peripheral and transition zone  characterized as PIRADS 4 - Clinically significant cancer is likely to  be present.  There is short segment capsular abutment with low  likelihood of minimal extraprostatic extension.  3.  Additional lesion in the right mid gland peripheral zone  characterized PIRADS 4 - Clinically significant cancer is likely to be  present. There minimal capsular abutment with no convincing evidence  of extraprostatic extension.  4.  No suspicious adenopathy.  5.  Focal observation in the left ischium, indeterminate, consider  bone scan for further evaluation.  6.  Increased size of now 2.1 cm right urinary bladder stone.      PIRADS? v2.1 Assessment Categories   PIRADS 1: Very low (clinically significant cancer is highly unlikely  to be present)   PIRADS 2: Low (clinically significant cancer is unlikely to be  present)   PIRADS 3: Intermediate (the presence of clinically significant cancer  is equivocal)    PIRADS 4: High (clinically significant cancer is likely to be present)    PIRADS 5: Very high (clinically significant cancer is highly likely to  be present)          I have personally reviewed the examination and initial interpretation  and I agree with the findings.    BRODIE SYKES MD         SYSTEM ID:  [id number]            Medications Prior to Admission:     No medications prior to admission.            Discharge Medications:     Discharge Medication List as of 4/8/2023  3:06 PM      START taking these medications    Details   acetaminophen (TYLENOL) 325 MG tablet Take 1-2 tablets (325-650 mg) by mouth every 6 hours as needed for mild pain, Disp-100 tablet, R-0, E-Prescribe      oxyCODONE (ROXICODONE) 5 MG tablet Take 1 tablet (5 mg) by mouth every 6 hours as needed for pain, Disp-12 tablet, R-0, Local Print      senna-docusate (SENOKOT-S/PERICOLACE) 8.6-50 MG tablet Take 2 tablets by mouth 2 times daily for 10 days, Disp-40 tablet, R-0, E-Prescribe         CONTINUE these medications which have NOT CHANGED    Details   atorvastatin (LIPITOR) 20 MG tablet Take 20 mg by mouth At Bedtime, Historical      Brimonidine Tartrate 0.025 % SOLN Apply 1 drop to eye as needed, Historical      Cholecalciferol (VITAMIN D PO) Take 1,000 mg by mouth every morning , Historical      diphenhydrAMINE (BENADRYL) 25 MG capsule Take 25 mg by mouth every 6 hours as needed for itching or allergies, Historical      Levothyroxine Sodium (LEVOTHROID PO) Take 125 mcg by mouth every morning , Historical         STOP taking these medications       finasteride (PROSCAR) 5 MG tablet Comments:   Reason for Stopping:         finasteride (PROSCAR) 5 MG tablet Comments:   Reason for Stopping:         tamsulosin (FLOMAX) 0.4 MG capsule Comments:   Reason for Stopping:                      Brief History of Illness:   Reason for admission requiring a surgical or invasive procedure:   Prostate cancer (H) [C61]   The patient underwent the following  procedure(s):   See above   There were no immediate complications during this procedure.    Please refer to the full operative summary for details.           Hospital Course:   The patient's hospital course was unremarkable.  Filemon Modi recovered as anticipated and experienced no post-operative complications.      On POD#1 patient was ambulating without assitance, tolerating a regular diet, had pain controlled with PO medications to go home with, and requiring no IV medications or fluids. Patient was discharged home with appropriate contact information, follow-up and instructions as seen below in the discharge paperwork.         Final Pathology Result:   Pending at time of discharge         Discharge Instructions and Follow-Up:     Discharge Procedure Orders   Reason for your hospital stay   Order Comments: Robotic prostatectomy     Activity   Order Comments: Your activity upon discharge: please see instructions below     Order Specific Question Answer Comments   Is discharge order? Yes      Discharge Instructions   Order Comments: Robotic Assisted Laparoscopic Prostatectomy (RALP)    Activity  - No strenuous exercise for 6 weeks.  - No lifting, pushing, pulling more than 10 pounds for 6 weeks.   - Do not strain with bowel movements.  - Do not drive until you can press the brake pedal quickly and fully without pain.   - Do not operate a motor vehicle while taking narcotic pain medications.     Diet  You may resume your regular post-procedure diet  Many patients do not regain their full appetite for several weeks after surgery  Take it slow, eating small meals frequently  If you notice you are passing less gas or feeling bloated, stop eating solid foods and stick to liquids for the next several hours until you begin to pass gas again.  You are not required to have a bowel movement prior to leaving the hospital. Some patients take several days for bowel movements to return due to anesthesia and pain medications.  "    Incisions  - You may shower and get incisions wet starting 48 hrs after surgery.  - Do not scrub incisions or submerge wounds for 2 weeks or until seen in follow-up.   - Remove wound dressing 48 hours after surgery.   - If purple dermabond glue was used, avoid applying any lotions or ointments.   - If steri-strips were used, they will fall off on their own.   - Leave incision open to air. Cover with gauze only if needed for comfort or to protect clothing from drainage.   - The stitches do not need to be removed, they will dissolve on their own.    Catheter Care:  You will be discharged home with a urinary catheter in place. Your nurse will instruct you on how to care for this at home.  Empty the bag into the toilet several times daily   Keep the catheter secured to your thigh using the securement device. Ensure the tubing doesn't become snagged or tugged.  You may shower normally and allow soapy water to run over the catheter  Sometimes people notice build-up at the insertion site of the catheter to the urethra. You may gently wipe this away with a towelette or washcloth.     Common catheter issues:  Urine leaking around the catheter. This is a common finding in patients with a catheter and is usually no cause for alarm. Typically, it is due to bladder spasms. Bladder spasms occur because your bladder is not used to a forgein object and tries to \"squeeze\" this out, releasing urine around the catheter. Sometimes patients can experience abdominal cramping. Typically, bladder spasms resolve after a few days. If you find bladder spasms particularly bothersome, ask your doctor about medication to calm these.   Occasionally, urine leaking from around that catheter can be due to a blockage of the catheter- especially if your urine appears bloody. If the urine does not appear to be draining through the tube and your bladder feels full, please contact our department.  The feeling of needing to urinate. Some patients feel " "the persistent need to urinate with the catheter in place. As long as the catheter is still draining, this is likely due to a \"forgein object sensation\" in your bladder. Even though your bladder is empty of urine, the catheter balloon gives a false sensation that your bladder is full. This typically subsides after 24-48 hours but there are medications that may be able to help with this if the feeling is too bothersome.   Catheter sliding in and out. There is a balloon filled with water inside your bladder to prevent the catheter from slipping out. The tubing is quite long and may slide back and forth freely. If the catheter does slide completely out, save the catheter and call our department immediately.   You may use Vaseline at the tip of the penis to prevent chafing  If you need extra catheter supplies (leg bags, securement devices), call the clinic during business hours to arrange for this.     Medications  - Transition from narcotic pain medications to tylenol (acetaminophen) as you are able.  Wean yourself off all pain medications as you are able.  - Some pain medications contain both tylenol (acetaminophen) and a narcotic (Norco, vicodin, percocet), do not take more than 4,000mg of Tylenol (acetaminophen) from all sources in any 24 hour period.  - Narcotics can make you constipated.  Take over the counter fiber (metamucil or benefiber) and stool softeners (miralax, docusate or senna) while taking narcotic pain medications, but stop if you develop diarrhea.  - No driving or operating machinery while taking narcotic pain medications     Follow-Up:  - Follow up with Dr. Escobedo as scheduled on 4/14/2023.  - Call or return sooner than your regularly scheduled visit if you develop any of the following: fever (greater than 101.5), uncontrolled pain, uncontrolled nausea or vomiting, or inability to urinate.    Phone numbers:   - Monday through Friday 8am to 4:30pm: Call [phone] with questions, requests for " "medication refills, or to schedule or confirm an appointment.  - Nights or weekends: call the after hours emergency pager - [phone] and tell the  \"I would like to page the Urology Resident on call.\" Please note, due to prescribing laws, resident physicians are unable to prescribe narcotics after-hours. If you feel as though you will need a refill of a narcotic pain medication, you will need to call the clinic during business hours OR seek emergency care.  - For emergencies, call 911     Diet   Order Comments: Follow this diet upon discharge: regular     Order Specific Question Answer Comments   Is discharge order? Yes             Discharge Disposition:     Discharged to Home      Condition at discharge: Good    --    Gene Kerr MD  Urology Resident    7:07 AM, 4/9/2023    "

## 2023-04-10 ENCOUNTER — TELEPHONE (OUTPATIENT)
Dept: UROLOGY | Facility: CLINIC | Age: 78
End: 2023-04-10
Payer: COMMERCIAL

## 2023-04-10 NOTE — TELEPHONE ENCOUNTER
Health Call Center    Phone Message    May a detailed message be left on voicemail: yes     Reason for Call: Medication Refill Request    Has the patient contacted the pharmacy for the refill? Yes   Name of medication being requested: oxyCODONE (ROXICODONE) 5 MG tablet  Provider who prescribed the medication: Dr. Escobedo  Pharmacy: [id number] - [place], [place] - [address]  Date medication is needed: today    Please let patient know when refill has been sent to pharmacy. Thank you.    Action Taken: Message routed to:  [facility] ([facility] Urology    Travel Screening: Not Applicable

## 2023-04-10 NOTE — TELEPHONE ENCOUNTER
Spoke to pt. He reports that pain was good on Saturday and Sunday. Catheter is working well. No BM yet, but is passing gas. Will note from urinary leakage and worsening pain when he is pushing to have a BM. Last oxycodone taken around midnight last night. Pain got really bad last night. He states that it was a sudden pain. Tylenol and oxycodone are helping to calm it down. Noting some blood in urine intermittently. No clots. No fever. Able to eat and drink ok. Taking senokot daily. Has 6 tablets left. He requests for refill.     Carmela Lord, MSN RN

## 2023-04-11 ENCOUNTER — TELEPHONE (OUTPATIENT)
Dept: UROLOGY | Facility: CLINIC | Age: 78
End: 2023-04-11
Payer: COMMERCIAL

## 2023-04-11 DIAGNOSIS — C61 PROSTATE CANCER (H): Primary | ICD-10-CM

## 2023-04-12 ENCOUNTER — PRE VISIT (OUTPATIENT)
Dept: UROLOGY | Facility: CLINIC | Age: 78
End: 2023-04-12
Payer: COMMERCIAL

## 2023-04-12 LAB
APPEARANCE STONE: NORMAL
COMPN STONE: NORMAL
PATH REPORT.COMMENTS IMP SPEC: NORMAL
PATH REPORT.COMMENTS IMP SPEC: NORMAL
PATH REPORT.FINAL DX SPEC: NORMAL
PATH REPORT.GROSS SPEC: NORMAL
PATH REPORT.MICROSCOPIC SPEC OTHER STN: NORMAL
PATH REPORT.RELEVANT HX SPEC: NORMAL
PATHOLOGY SYNOPTIC REPORT: NORMAL
PHOTO IMAGE: NORMAL
SPECIMEN WT: 4717 MG

## 2023-04-12 NOTE — TELEPHONE ENCOUNTER
Reason for visit: post op      Dx/Hx/Sx: prostate cancer     Records/imaging/labs/orders: cystogram scheduled 4/14, pathology in epic    At Rooming: standard if TOV was successful on 4/14

## 2023-04-14 ENCOUNTER — ALLIED HEALTH/NURSE VISIT (OUTPATIENT)
Dept: UROLOGY | Facility: CLINIC | Age: 78
End: 2023-04-14
Payer: COMMERCIAL

## 2023-04-14 ENCOUNTER — ANCILLARY PROCEDURE (OUTPATIENT)
Dept: GENERAL RADIOLOGY | Facility: CLINIC | Age: 78
End: 2023-04-14
Attending: UROLOGY
Payer: COMMERCIAL

## 2023-04-14 DIAGNOSIS — C61 PROSTATE CANCER (H): ICD-10-CM

## 2023-04-14 DIAGNOSIS — C61 PROSTATE CANCER (H): Primary | ICD-10-CM

## 2023-04-14 PROCEDURE — 74430 CONTRAST X-RAY BLADDER: CPT | Mod: GC | Performed by: RADIOLOGY

## 2023-04-14 PROCEDURE — 51600 INJECTION FOR BLADDER X-RAY: CPT | Mod: GC | Performed by: RADIOLOGY

## 2023-04-14 PROCEDURE — 99024 POSTOP FOLLOW-UP VISIT: CPT

## 2023-04-14 NOTE — PROGRESS NOTES
No chief complaint on file.      Patient Active Problem List   Diagnosis     Prostate cancer (H)       No Known Allergies    Current Outpatient Medications   Medication Sig Dispense Refill     acetaminophen (TYLENOL) 325 MG tablet Take 1-2 tablets (325-650 mg) by mouth every 6 hours as needed for mild pain 100 tablet 0     atorvastatin (LIPITOR) 20 MG tablet Take 20 mg by mouth At Bedtime       Brimonidine Tartrate 0.025 % SOLN Apply 1 drop to eye as needed       Cholecalciferol (VITAMIN D PO) Take 1,000 mg by mouth every morning        diphenhydrAMINE (BENADRYL) 25 MG capsule Take 25 mg by mouth every 6 hours as needed for itching or allergies       Levothyroxine Sodium (LEVOTHROID PO) Take 125 mcg by mouth every morning        senna-docusate (SENOKOT-S/PERICOLACE) 8.6-50 MG tablet Take 2 tablets by mouth 2 times daily for 10 days 40 tablet 0       Social History     Tobacco Use     Smoking status: Never     Smokeless tobacco: Never   Substance Use Topics     Alcohol use: Yes     Alcohol/week: 7.0 standard drinks of alcohol     Types: 7 Glasses of wine per week     Comment: wine daily     Drug use: Yes     Types: Marijuana     Comment: Daily       Filemon Modi comes into clinic today at the request of No ref. provider found for TOV / catheter removal.    Patient diagnosis: s/p prostatectomy    This service provided today was under the direct supervision of Dr. Patel, who was available if needed.    Filemon Modi presented today for a trial of void.  Approximately 150 mL of normal saline instilled into bladder via catheter.  Patient stated he had urge to urinate and catheter was removed without difficulty.  Patient was given a urinal to measure urine output.  Patient voided approximately 200 mL of yellow urine.  PVR 0 mL.    Cipro 500 given per protocol: Yes  The following medication was given:     MEDICATION:  Cipro  ROUTE: PO  SITE: Medication was given orally   DOSE: 500 mg  LOT #: [id number]  : [employer]  EXPIRATION DATE: 05/2024  NDC#: 9410-7491-41   Was there drug waste? No    Prior to medication administration, verified patient identity using patient's name and date of birth.  Due to medication administration, patient instructed to remain in clinic for 15 minutes  afterwards, and to report any adverse reaction to me immediately.    Drug Amount Wasted:  None.  Vial/Syringe: Single dose vial    Patient did tolerate procedure well.    Teaching done with patient verbally as where to call or go if pain, fever, or unable to urinate post catheter removal.    Rc Joe RN  4/14/2023  4:41 PM

## 2023-04-17 ENCOUNTER — OFFICE VISIT (OUTPATIENT)
Dept: UROLOGY | Facility: CLINIC | Age: 78
End: 2023-04-17
Payer: COMMERCIAL

## 2023-04-17 VITALS
BODY MASS INDEX: 23.49 KG/M2 | SYSTOLIC BLOOD PRESSURE: 171 MMHG | HEART RATE: 84 BPM | WEIGHT: 183 LBS | HEIGHT: 74 IN | DIASTOLIC BLOOD PRESSURE: 115 MMHG

## 2023-04-17 DIAGNOSIS — N52.31 ERECTILE DYSFUNCTION AFTER RADICAL PROSTATECTOMY: Primary | ICD-10-CM

## 2023-04-17 DIAGNOSIS — C61 PROSTATE CANCER (H): ICD-10-CM

## 2023-04-17 PROCEDURE — 99024 POSTOP FOLLOW-UP VISIT: CPT | Performed by: UROLOGY

## 2023-04-17 RX ORDER — SILDENAFIL CITRATE 20 MG/1
TABLET ORAL
Qty: 90 TABLET | Refills: 11 | Status: SHIPPED | OUTPATIENT
Start: 2023-04-17 | End: 2024-07-30

## 2023-04-17 ASSESSMENT — PAIN SCALES - GENERAL: PAINLEVEL: NO PAIN (0)

## 2023-04-17 NOTE — NURSING NOTE
"Chief Complaint   Patient presents with     Surgical Followup       Blood pressure (!) 171/115, pulse 84, height 1.88 m (6' 2\"), weight 83 kg (183 lb). Body mass index is 23.5 kg/m .    Patient Active Problem List   Diagnosis     Prostate cancer (H)       No Known Allergies    Current Outpatient Medications   Medication Sig Dispense Refill     acetaminophen (TYLENOL) 325 MG tablet Take 1-2 tablets (325-650 mg) by mouth every 6 hours as needed for mild pain 100 tablet 0     atorvastatin (LIPITOR) 20 MG tablet Take 20 mg by mouth At Bedtime       Brimonidine Tartrate 0.025 % SOLN Apply 1 drop to eye as needed       Cholecalciferol (VITAMIN D PO) Take 1,000 mg by mouth every morning        diphenhydrAMINE (BENADRYL) 25 MG capsule Take 25 mg by mouth every 6 hours as needed for itching or allergies       Levothyroxine Sodium (LEVOTHROID PO) Take 125 mcg by mouth every morning        senna-docusate (SENOKOT-S/PERICOLACE) 8.6-50 MG tablet Take 2 tablets by mouth 2 times daily for 10 days 40 tablet 0       Social History     Tobacco Use     Smoking status: Never     Smokeless tobacco: Never   Substance Use Topics     Alcohol use: Yes     Alcohol/week: 7.0 standard drinks of alcohol     Types: 7 Glasses of wine per week     Comment: wine daily     Drug use: Yes     Types: Marijuana     Comment: Daily       Kristine Arevalo  4/17/2023  9:56 AM  "

## 2023-04-17 NOTE — PROGRESS NOTES
CC: 79 yo male with prostate cancer s/p RALP with bladder stone removal 4/7/23.  Doing well.  Had catheter removed on 4/14/23.  Has noticed some improvement in urinary control with each day.    OBJECTIVE:  PAthology from 4/7/23 shows Gl 3+4=7 [id number] focal pos mar    Stone analysis is 90% calcium oxalate, 10% calcium phosphate    ASSESSMENT AND PLAN: Over half of today's 25-minute visit was spent reviewing the chart, results and counseling the patient regarding his prostate cancer.  We will check a PSA at 3 months.  Patient will follow up with PT for pelvic floor strengthening exercises and he was given a script fro sildenafil 20 mg po daily.

## 2023-04-17 NOTE — PATIENT INSTRUCTIONS
Please follow up with YEMI Goldstein in 5 weeks    Please follow up with Dr. Escobedo in 3 months with a PSA prior      It was a pleasure meeting with you today.  Thank you for allowing me and my team the privilege of caring for you today.  YOU are the reason we are here, and I truly hope we provided you with the excellent service you deserve.  Please let us know if there is anything else we can do for you so that we can be sure you are leaving completely satisfied with your care experience.

## 2023-04-17 NOTE — LETTER
4/17/2023       RE: Filemon Modi  [address] [facility] [id number]     Dear Colleague,    Thank you for referring your patient, Filemon Modi, to the [facility] [place] [facility]. Please see a copy of my visit note below.    CC: 77 yo male with prostate cancer s/p RALP with bladder stone removal 4/7/23.  Doing well.  Had catheter removed on 4/14/23.  Has noticed some improvement in urinary control with each day.    OBJECTIVE:  PAthology from 4/7/23 shows Gl 3+4=7 [id number] focal pos mar    Stone analysis is 90% calcium oxalate, 10% calcium phosphate    ASSESSMENT AND PLAN: Over half of today's 25-minute visit was spent reviewing the chart, results and counseling the patient regarding his prostate cancer.  We will check a PSA at 3 months.  Patient will follow up with PT for pelvic floor strengthening exercises and he was given a script fro sildenafil 20 mg po daily.      Again, thank you for allowing me to participate in the care of your patient.      Sincerely,    Royal Escobedo MD

## 2023-04-18 ENCOUNTER — DOCUMENTATION ONLY (OUTPATIENT)
Dept: OTHER | Facility: CLINIC | Age: 78
End: 2023-04-18
Payer: COMMERCIAL

## 2023-04-21 ENCOUNTER — THERAPY VISIT (OUTPATIENT)
Dept: PHYSICAL THERAPY | Facility: CLINIC | Age: 78
End: 2023-04-21
Attending: UROLOGY
Payer: COMMERCIAL

## 2023-04-21 DIAGNOSIS — N81.89 PELVIC FLOOR WEAKNESS: Primary | ICD-10-CM

## 2023-04-21 PROCEDURE — 97161 PT EVAL LOW COMPLEX 20 MIN: CPT | Mod: GP | Performed by: PHYSICAL THERAPIST

## 2023-04-21 PROCEDURE — 97110 THERAPEUTIC EXERCISES: CPT | Mod: GP | Performed by: PHYSICAL THERAPIST

## 2023-04-21 PROCEDURE — 97530 THERAPEUTIC ACTIVITIES: CPT | Mod: GP | Performed by: PHYSICAL THERAPIST

## 2023-04-21 NOTE — PROGRESS NOTES
Physical Therapy Initial Evaluation  Subjective:  The history is provided by the patient. No  was used.   Patient Health History  Filemon Modi being seen for Pelvic Floor PT.     Problem began: 4/7/2023.   Problem occurred: After effect of surgery   Pain is reported as 1/10 on pain scale.  General health as reported by patient is good.  Pertinent medical history includes: cancer and thyroid problems.   Red flags:  None as reported by patient.  Medical allergies: none.   Surgeries include:  Cancer surgery and other. Other surgery history details: Appendectomy, Left & Right Inguinal hernia repair,  Right hemicolectomy (colon cancer), Radical prostatectomy.    Current medications:  Thyroid medication and other. Other medications details: statin, ED medication.    Current occupation is Retired.   Primary job tasks include:  Driving, lifting/carrying, operating a machine/assembly and pushing/pulling.                  Therapist Generated HPI Evaluation  Problem details: He feels as though he is recovering really well. He feels that his appetatie and bowel movements are back to normal. Initially he had some perineal soreness in sitting, but that is minimal now. Since he got his catheter out he is starting to have more control of his bladder. He has not leaking when sleeping and sitting. He is voiding about 2-3x at night, waking up feeling the urge to urinate. Filemon is using about 2 depends a day.    Before his prostatectomy he had BPH for 20 years.    Before surgery prior exercise program: 5x a week, exercise bike 40 minutes for variable resistance, weights for upper body.    He started walking 30-35 minutes brisk walking since Tuesday..         Type of problem:  Other.      Condition occurred with:  After surgery.  Where condition occurred: for unknown reasons.  Patient reports pain:  N/a.      Since onset symptoms are rapidly improving.  Symptoms are exacerbated by sneezing, stress, coughing and  walking  and relieved by rest.      Work activity restrictions: retired.  Barriers include:  None as reported by patient.    Urination   Do you leak on the way to the bathroom or with a strong urge to void? no  Do you leak with cough, sneeze, jumping, running? yes  Any other activities that cause leaking? Passing gas  Do you have triggers that make you feel you can't wait to go to the bathroom? no What are they? n/a  Type of pad and number used per day? 2 depends  When you leak what is the amount? Small drops  How long can you delay the need to urinate? Several minutes  How many times do you get up to urinate at night? 2-3x  How many times do you urinate during the day? Not sure  Can you stop the flow of urine when on the toilet? yes  Is the volume of urine passed usually: medium  Do you strain to pass urine? no  Do you have a slow or hesitant urinary stream? no  Do you have difficulty initiating the urine stream? no  How many bladder infections have you had in the last 12 months? 0  What is you fluid intake per day? Water (8oz) 45oz  Caffeine 28oz  Alcohol 5oz  Do you feel like you empty completely when voiding? yes    Bowel Habits  Frequency of bowel movements? 1-2x a day  Consistency of stool? Soft formed  Do you ignore the urge to defecate? no  Do you strain to pass stool? no  Do you feel that you empty completely? yes    Pelvic Pain  When do you have pelvic pain? no    Objective:    POSTURE: rounded shoulders    EXTERNAL ASSESSMENT:  Skin condition:normal  Bearing down/coughing:NT  Muscle contraction/perineal mobility: elevation and urogenital triangle descent  No tenderness to palpation or tenderness of bulbocavernosus, ischiocavernosus, superficial transverse perineal, and levator ani bilaterally  Pt able to isolate pelvic floor muscles in hook lying position after cueing to keep legs relaxed.      Assessment/Plan:    Patient is a 78 year old male with pelvic complaints.    Patient has the following  significant findings with corresponding treatment plan.                Diagnosis 1:  S/p prostatectomy performed 4/7/23  Pain -  hot/cold therapy, US, electric stimulation, mechanical traction, manual therapy, STS, splint/taping/bracing/orthotics, self management, education, directional preference exercise and home program  Decreased strength - therapeutic exercise, therapeutic activities and home program  Impaired muscle performance - biofeedback, electric stimulation, neuro re-education and home program  Decreased function - therapeutic activities, home program and functional performance testing    Therapy Evaluation Codes:   Cumulative Therapy Evaluation is: Low complexity.    Previous and current functional limitations:  (See Goal Flow Sheet for this information)    Short term and Long term goals: (See Goal Flow Sheet for this information)     Communication ability:  Patient appears to be able to clearly communicate and understand verbal and written communication and follow directions correctly.  Treatment Explanation - The following has been discussed with the patient:   RX ordered/plan of care  Anticipated outcomes  Possible risks and side effects  This patient would benefit from PT intervention to resume normal activities.   Rehab potential is good.    Frequency:  1 X week, once daily  Duration:  for 4 weeks tapering to 2 X a month over 8 weeks  Discharge Plan:  Achieve all LTG.  Independent in home treatment program.  Reach maximal therapeutic benefit.    Please refer to the daily flowsheet for treatment today, total treatment time and time spent performing 1:1 timed codes.

## 2023-04-21 NOTE — PROGRESS NOTES
[facility]    OUTPATIENT Physical Therapy ORTHOPEDIC EVALUATION  PLAN OF TREATMENT FOR OUTPATIENT REHABILITATION  (COMPLETE FOR INITIAL CLAIMS ONLY)  Patient's Last Name, First Name, M.I.  YOB: 1945  Filemon Modi    Provider s Name:  [facility]   Medical Record No.  [id number]   Start of Care Date:  04/21/23   Onset Date:  04/07/23    Treatment Diagnosis:  pelvic floor dysfunction Medical Diagnosis:  Data Unavailable       Goals:     04/21/23 0700   Pad/Pantiliner usage   Current Functional Level Number of pull ups used in a day/night   Performance Level 2   STG Target Performance Number of pull ups used in a day/night   Performance Level 1   Rationale continence throughout the day   Due Date 06/02/23   LTGTarget Performance Number of pads used in a day/night   Performance Level 2   Rationale continence throughout the day;for healthy hygiene and to prevent skin breakdown   Due Date 07/14/23         Therapy Frequency:  1x a week for the first 4 weeks followed by 2x a month for the following 8 weeks  Predicted Duration of Therapy Intervention:  12 weeks    Jacklyn Juan, PT                 I CERTIFY THE NEED FOR THESE SERVICES FURNISHED UNDER        THIS PLAN OF TREATMENT AND WHILE UNDER MY CARE     (Physician attestation of this document indicates review and certification of the therapy plan).                     Certification Date From:  04/21/23   Certification Date To:  07/14/23    Referring Provider:  Royal Anthony*    Initial Assessment        See Epic Evaluation SOC Date: 04/21/23

## 2023-04-28 ENCOUNTER — THERAPY VISIT (OUTPATIENT)
Dept: PHYSICAL THERAPY | Facility: CLINIC | Age: 78
End: 2023-04-28
Attending: UROLOGY
Payer: COMMERCIAL

## 2023-04-28 DIAGNOSIS — M62.89 PELVIC FLOOR DYSFUNCTION: Primary | ICD-10-CM

## 2023-04-28 PROCEDURE — 97530 THERAPEUTIC ACTIVITIES: CPT | Mod: GP | Performed by: PHYSICAL THERAPIST

## 2023-04-28 PROCEDURE — 97112 NEUROMUSCULAR REEDUCATION: CPT | Mod: GP | Performed by: PHYSICAL THERAPIST

## 2023-05-05 ENCOUNTER — THERAPY VISIT (OUTPATIENT)
Dept: PHYSICAL THERAPY | Facility: CLINIC | Age: 78
End: 2023-05-05
Payer: COMMERCIAL

## 2023-05-05 DIAGNOSIS — N81.89 PELVIC FLOOR WEAKNESS: Primary | ICD-10-CM

## 2023-05-05 PROCEDURE — 97530 THERAPEUTIC ACTIVITIES: CPT | Mod: GP | Performed by: PHYSICAL THERAPIST

## 2023-05-05 PROCEDURE — 97112 NEUROMUSCULAR REEDUCATION: CPT | Mod: 59 | Performed by: PHYSICAL THERAPIST

## 2023-05-15 ENCOUNTER — THERAPY VISIT (OUTPATIENT)
Dept: PHYSICAL THERAPY | Facility: CLINIC | Age: 78
End: 2023-05-15
Payer: COMMERCIAL

## 2023-05-15 DIAGNOSIS — M62.89 PELVIC FLOOR DYSFUNCTION: Primary | ICD-10-CM

## 2023-05-15 PROCEDURE — 97530 THERAPEUTIC ACTIVITIES: CPT | Mod: GP | Performed by: PHYSICAL THERAPIST

## 2023-05-18 ENCOUNTER — PRE VISIT (OUTPATIENT)
Dept: UROLOGY | Facility: CLINIC | Age: 78
End: 2023-05-18
Payer: COMMERCIAL

## 2023-05-18 NOTE — TELEPHONE ENCOUNTER
December 20, 2022      Harsh Marley  [address] [place] [address]        Dear ,    We are writing to inform you of your test results.    A1c is up slightly but still in pre-diabetic range.       Resulted Orders   Comprehensive metabolic panel   Result Value Ref Range    Sodium 141 136 - 145 mmol/L    Potassium 4.3 3.4 - 5.3 mmol/L    Chloride 106 98 - 107 mmol/L    Carbon Dioxide (CO2) 25 22 - 29 mmol/L    Anion Gap 10 7 - 15 mmol/L    Urea Nitrogen 15.7 6.0 - 20.0 mg/dL    Creatinine 0.88 0.67 - 1.17 mg/dL    Calcium 9.4 8.6 - 10.0 mg/dL    Glucose 131 (H) 70 - 99 mg/dL    Alkaline Phosphatase 93 40 - 129 U/L    AST 17 10 - 50 U/L    ALT 23 10 - 50 U/L    Protein Total 7.5 6.4 - 8.3 g/dL    Albumin 4.5 3.5 - 5.2 g/dL    Bilirubin Total 0.3 <=1.2 mg/dL    GFR Estimate >90 >60 mL/min/1.73m2      Comment:      Effective December 21, 2021 eGFRcr in adults is calculated using the 2021 CKD-EPI creatinine equation which includes age and gender (Jorge et al., NEJ, DOI: 10.1056/FHMCmj8008455)   Lipid Profile (Chol, Trig, HDL, LDL calc)   Result Value Ref Range    Cholesterol 167 <200 mg/dL    Triglycerides 128 <150 mg/dL    Direct Measure HDL 40 >=40 mg/dL    LDL Cholesterol Calculated 101 (H) <=100 mg/dL    Non HDL Cholesterol 127 <130 mg/dL    Narrative    Cholesterol  Desirable:  <200 mg/dL    Triglycerides  Normal:  Less than 150 mg/dL  Borderline High:  150-199 mg/dL  High:  200-499 mg/dL  Very High:  Greater than or equal to 500 mg/dL    Direct Measure HDL  Female:  Greater than or equal to 50 mg/dL   Male:  Greater than or equal to 40 mg/dL    LDL Cholesterol  Desirable:  <100mg/dL  Above Desirable:  100-129 mg/dL   Borderline High:  130-159 mg/dL   High:  160-189 mg/dL   Very High:  >= 190 mg/dL    Non HDL Cholesterol  Desirable:  130 mg/dL  Above Desirable:  130-159 mg/dL  Borderline High:  160-189 mg/dL  High:  190-219 mg/dL  Very High:  Greater than or equal to 220 mg/dL   Hemoglobin A1c  Reason for visit: 5 week follow up    Relevant information: Procedure done 4/14/2023    Records/imaging/labs/orders: Epic    At Rooming:Chapin Kitchen  5/18/2023  2:35 PM     Result Value Ref Range    Estimated Average Glucose 134 mg/dL    Hemoglobin A1C 6.3 (H) <5.7 %      Comment:      Normal <5.7%   Prediabetes 5.7-6.4%    Diabetes 6.5% or higher     Note: Adopted from ADA consensus guidelines.       If you have any questions or concerns, please call the clinic at the number listed above.       Sincerely,      Jennifer Herbert MD

## 2023-05-19 NOTE — PROGRESS NOTES
Subjective     REASON FOR VISIT  Prostate cancer follow up.    HISTORY OF PRESENT ILLNESS  Mr. Filemon Thornton is a pleasant 78 year old gentleman with history of [place] 3+4 prostate cancer status post RALP on 4/7/23 with final pathology showing [id number] and focal positive margins. Presents today for 6 week follow-up.      Today:    Stress incontinence is still a problem - 4 visits done, 2 to go  o Flow of urine is much better  o Able to start and stop stream well  o Feels he empties his bladder well  o Nocturia x2  o 2 pads per day  o Does note some improvement, mostly in the first week after catheter, no seems to have plateaued    o Pelvic floor is a bit sore from working out with pelvic floor physical therapy   o Leakage is very much related to level of activity    Feels incisions have healed well    Bowel routine is back to normal, but having more gas than usual    Able to make it about 1 hour walking at a time, 3 miles    Taking sildenafil 20 mg daily  o 7/10 erection with this dose and penile ring   o Plans to continue daily dose until 3 months    Overall very happy with the situation    Objective     PHYSICAL EXAM  Physical Exam  Constitutional:       Appearance: Normal appearance.   HENT:      Head: Normocephalic and atraumatic.      Right Ear: External ear normal.      Left Ear: External ear normal.      Nose: Nose normal.   Eyes:      General:         Right eye: No discharge.         Left eye: No discharge.   Pulmonary:      Effort: Pulmonary effort is normal. No respiratory distress.   Abdominal:      General: There is no distension.   Musculoskeletal:         General: No deformity.   Neurological:      Mental Status: He is alert and oriented to person, place, and time.   Psychiatric:         Mood and Affect: Mood normal.         IMPRESSION  1. [place] 3+4 prostate cancer, [id number], positive margins   2. Erectile dysfunction after radical prostatectomy  3. Stress incontinence after radical prostatectomy    It  was a pleasure to see Mr. Filemon Modi in clinic today in follow-up for his history of [place] 3+4 prostate cancer status post RALP on 4/7/23 with final pathology showing [id number] and focal positive margins.  Overall, I am very glad to hear that both his urinary incontinence and erections are heading in the right direction.  I encouraged him to continue working with the pelvic floor physical therapists to get better control of his urination, and stated that he will have a slightly longer road and others given the level of obstruction his prostate was causing in the years leading up to his prostatectomy.  In regards to erections, I am very happy to hear that he is already able to get a 7 out of 10 erection 6 weeks out from surgery, and anticipate that this would continue to improve to get him close to wherever he was at baseline prior to this surgery.  He will continue to take the daily dose of sildenafil but encouraged him to trial taking higher as needed doses up to 100 mg maximum daily dose.     In regards to activity restrictions, he can start adding in more regular activity including weightlifting and more intensive walking, but I did encourage him to avoid any biking exercises until at least his 3-month follow-up with Dr. Escobedo in July.  Mostly I told him to continue listening to his body as he starts to add activity back in.    In regards to follow-up, he already has his next visit scheduled with Dr. Escobedo alongside his first PSA draw.  I will plan to see him back sometime afterwards and follow-up    Mr. Modi expressed understanding and agreement with the above discussion and plan and all of his questions were answered to his satisfaction.     PLAN  -- Keep scheduled appointment with Dr. Escobedo in 6 weeks  -- Increase activity level as tolerated, but continue to avoid biking  -- Continue on daily sildenafil with increased as needed dosing    Signed by:    James Sifuentes PA-C    I spent a total of 33  minutes spent on the date of the encounter doing chart review, history and exam, documentation, and further activities as noted above.

## 2023-05-22 ENCOUNTER — OFFICE VISIT (OUTPATIENT)
Dept: UROLOGY | Facility: CLINIC | Age: 78
End: 2023-05-22
Payer: COMMERCIAL

## 2023-05-22 VITALS — BODY MASS INDEX: 22.75 KG/M2 | WEIGHT: 183 LBS | HEIGHT: 75 IN

## 2023-05-22 DIAGNOSIS — N99.89 STRESS INCONTINENCE AFTER SURGICAL PROCEDURE: ICD-10-CM

## 2023-05-22 DIAGNOSIS — N39.3 STRESS INCONTINENCE AFTER SURGICAL PROCEDURE: ICD-10-CM

## 2023-05-22 DIAGNOSIS — C61 PROSTATE CANCER (H): Primary | ICD-10-CM

## 2023-05-22 DIAGNOSIS — N52.31 ERECTILE DYSFUNCTION AFTER RADICAL PROSTATECTOMY: ICD-10-CM

## 2023-05-22 PROCEDURE — 99024 POSTOP FOLLOW-UP VISIT: CPT | Performed by: STUDENT IN AN ORGANIZED HEALTH CARE EDUCATION/TRAINING PROGRAM

## 2023-05-22 ASSESSMENT — PAIN SCALES - GENERAL: PAINLEVEL: NO PAIN (0)

## 2023-05-22 NOTE — NURSING NOTE
Chief Complaint   Patient presents with     Follow Up     Radical prostatectomy         There were no vitals taken for this visit. There is no height or weight on file to calculate BMI.    Patient Active Problem List   Diagnosis     Prostate cancer (H)       No Known Allergies    Current Outpatient Medications   Medication Sig Dispense Refill     atorvastatin (LIPITOR) 20 MG tablet Take 20 mg by mouth At Bedtime       Brimonidine Tartrate 0.025 % SOLN Apply 1 drop to eye as needed       Cholecalciferol (VITAMIN D PO) Take 1,000 mg by mouth every morning        diphenhydrAMINE (BENADRYL) 25 MG capsule Take 25 mg by mouth every 6 hours as needed for itching or allergies       Levothyroxine Sodium (LEVOTHROID PO) Take 125 mcg by mouth every morning        sildenafil (REVATIO) 20 MG tablet Take 1 tab daily 90 tablet 11     acetaminophen (TYLENOL) 325 MG tablet Take 1-2 tablets (325-650 mg) by mouth every 6 hours as needed for mild pain 100 tablet 0       Social History     Tobacco Use     Smoking status: Never     Smokeless tobacco: Never   Substance Use Topics     Alcohol use: Yes     Alcohol/week: 7.0 standard drinks of alcohol     Types: 7 Glasses of wine per week     Comment: wine daily     Drug use: Yes     Types: Marijuana     Comment: Daily       Jessenia Haines  5/22/2023  10:44 AM

## 2023-05-22 NOTE — LETTER
5/22/2023       RE: Filemon Modi  [address] [facility] [address]     Dear Colleague,    Thank you for referring your patient, Filemon Modi, to the [facility]OLOG[facility] [place] at [facility]. Please see a copy of my visit note below.    Subjective     REASON FOR VISIT  Prostate cancer follow up.    HISTORY OF PRESENT ILLNESS  Mr. Filemon Thornton is a pleasant 78 year old gentleman with history of [place] 3+4 prostate cancer status post RALP on 4/7/23 with final pathology showing [id number] and focal positive margins. Presents today for 6 week follow-up.      Today:  Stress incontinence is still a problem - 4 visits done, 2 to go  Flow of urine is much better  Able to start and stop stream well  Feels he empties his bladder well  Nocturia x2  2 pads per day  Does note some improvement, mostly in the first week after catheter, no seems to have plateaued    Pelvic floor is a bit sore from working out with pelvic floor physical therapy   Leakage is very much related to level of activity  Feels incisions have healed well  Bowel routine is back to normal, but having more gas than usual  Able to make it about 1 hour walking at a time, 3 miles  Taking sildenafil 20 mg daily  7/10 erection with this dose and penile ring   Plans to continue daily dose until 3 months  Overall very happy with the situation    Objective     PHYSICAL EXAM  Physical Exam  Constitutional:       Appearance: Normal appearance.   HENT:      Head: Normocephalic and atraumatic.      Right Ear: External ear normal.      Left Ear: External ear normal.      Nose: Nose normal.   Eyes:      General:         Right eye: No discharge.         Left eye: No discharge.   Pulmonary:      Effort: Pulmonary effort is normal. No respiratory distress.   Abdominal:      General: There is no distension.   Musculoskeletal:         General: No deformity.   Neurological:      Mental Status: He is alert  and oriented to person, place, and time.   Psychiatric:         Mood and Affect: Mood normal.         IMPRESSION  [place] 3+4 prostate cancer, [id number], positive margins   Erectile dysfunction after radical prostatectomy  Stress incontinence after radical prostatectomy    It was a pleasure to see Mr. Filemon Modi in clinic today in follow-up for his history of [place] 3+4 prostate cancer status post RALP on 4/7/23 with final pathology showing [id number] and focal positive margins.  Overall, I am very glad to hear that both his urinary incontinence and erections are heading in the right direction.  I encouraged him to continue working with the pelvic floor physical therapists to get better control of his urination, and stated that he will have a slightly longer road and others given the level of obstruction his prostate was causing in the years leading up to his prostatectomy.  In regards to erections, I am very happy to hear that he is already able to get a 7 out of 10 erection 6 weeks out from surgery, and anticipate that this would continue to improve to get him close to wherever he was at baseline prior to this surgery.  He will continue to take the daily dose of sildenafil but encouraged him to trial taking higher as needed doses up to 100 mg maximum daily dose.     In regards to activity restrictions, he can start adding in more regular activity including weightlifting and more intensive walking, but I did encourage him to avoid any biking exercises until at least his 3-month follow-up with Dr. Escobedo in July.  Mostly I told him to continue listening to his body as he starts to add activity back in.    In regards to follow-up, he already has his next visit scheduled with Dr. Escobedo alongside his first PSA draw.  I will plan to see him back sometime afterwards and follow-up    Mr. Modi expressed understanding and agreement with the above discussion and plan and all of his questions were answered to his  satisfaction.     PLAN  -- Keep scheduled appointment with Dr. Escobedo in 6 weeks  -- Increase activity level as tolerated, but continue to avoid biking  -- Continue on daily sildenafil with increased as needed dosing    Signed by:    James Sifuentes PA-C    I spent a total of 33 minutes spent on the date of the encounter doing chart review, history and exam, documentation, and further activities as noted above.

## 2023-06-01 ENCOUNTER — HEALTH MAINTENANCE LETTER (OUTPATIENT)
Age: 78
End: 2023-06-01

## 2023-06-09 ENCOUNTER — THERAPY VISIT (OUTPATIENT)
Dept: PHYSICAL THERAPY | Facility: CLINIC | Age: 78
End: 2023-06-09
Payer: COMMERCIAL

## 2023-06-09 DIAGNOSIS — M62.89 PELVIC FLOOR DYSFUNCTION: Primary | ICD-10-CM

## 2023-06-09 PROCEDURE — 97110 THERAPEUTIC EXERCISES: CPT | Mod: GP | Performed by: PHYSICAL THERAPIST

## 2023-06-09 PROCEDURE — 97530 THERAPEUTIC ACTIVITIES: CPT | Mod: GP | Performed by: PHYSICAL THERAPIST

## 2023-06-30 ENCOUNTER — THERAPY VISIT (OUTPATIENT)
Dept: PHYSICAL THERAPY | Facility: CLINIC | Age: 78
End: 2023-06-30
Payer: COMMERCIAL

## 2023-06-30 DIAGNOSIS — M62.89 PELVIC FLOOR WEAKNESS, MALE: Primary | ICD-10-CM

## 2023-06-30 PROCEDURE — 97530 THERAPEUTIC ACTIVITIES: CPT | Mod: GP | Performed by: PHYSICAL THERAPIST

## 2023-07-12 ENCOUNTER — LAB (OUTPATIENT)
Dept: LAB | Facility: CLINIC | Age: 78
End: 2023-07-12
Payer: COMMERCIAL

## 2023-07-12 DIAGNOSIS — C61 PROSTATE CANCER (H): ICD-10-CM

## 2023-07-12 LAB — PSA SERPL DL<=0.01 NG/ML-MCNC: 0.08 NG/ML (ref 0–6.5)

## 2023-07-12 PROCEDURE — 84153 ASSAY OF PSA TOTAL: CPT | Performed by: PATHOLOGY

## 2023-07-12 PROCEDURE — 36415 COLL VENOUS BLD VENIPUNCTURE: CPT | Performed by: PATHOLOGY

## 2023-07-13 ENCOUNTER — PRE VISIT (OUTPATIENT)
Dept: UROLOGY | Facility: CLINIC | Age: 78
End: 2023-07-13
Payer: COMMERCIAL

## 2023-07-13 NOTE — TELEPHONE ENCOUNTER
Reason for Visit: PSA review    Diagnosis: prostate cancer    Orders/Procedures/Records: in system    Contact Patient: n/a    Rooming Requirements: normal      Michelle Garcia LPN  07/13/23  9:31 AM

## 2023-07-17 ENCOUNTER — VIRTUAL VISIT (OUTPATIENT)
Dept: UROLOGY | Facility: CLINIC | Age: 78
End: 2023-07-17
Payer: COMMERCIAL

## 2023-07-17 DIAGNOSIS — C61 PROSTATE CANCER (H): Primary | ICD-10-CM

## 2023-07-17 PROCEDURE — 99214 OFFICE O/P EST MOD 30 MIN: CPT | Mod: VID | Performed by: UROLOGY

## 2023-07-17 NOTE — LETTER
7/17/2023       RE: Filemon Modi  [address] [facility] [address]     Dear Colleague,    Thank you for referring your patient, Filemon Modi, to the [facility] UROLOGY CL[facility]IC [place] at [facility]. Please see a copy of my visit note below.    Virtual Visit Details    Type of service:  Video Visit   Video Start Time: 12:35pm    CC: 77 yo male with prostate cancer s/p RALP with bladder stone removal 4/7/23.  PAthology from 4/7/23 shows Gl 3+4=7 [id number] focal pos mar.  Notes he continues to have some incontinence but it is improving.  Other aspects of urinatin have improved.  He is wearing 1-2 pads/day.  Has some improving ED.     OBJECTIVE:  PSA from 7/12/23 is 0.08 ng/mL      ASSESSMENT AND PLAN: Over half of today's 25-minute visit was spent reviewing the chart, results and counseling the patient regarding his prostate cancer.  PSA is very low but detectable.  Will repeat PSA in 3 months.  He will continue with pelvic floor exercises.      Video End Time:12:55pm    Originating Location (pt. Location): Home    Distant Location (provider location):  On-site  Platform used for Video Visit: Kushal Escobedo MD

## 2023-07-17 NOTE — NURSING NOTE
Is the patient currently in the state of [place]? YES    Visit mode:VIDEO    If the visit is dropped, the patient can be reconnected by: VIDEO VISIT: Text to cell phone: [phone]    Will anyone else be joining the visit? NO      How would you like to obtain your AVS? MyChart    Are changes needed to the allergy or medication list? YES: Pt has medications flagged for removal     Reason for visit: RECHECK (3 month follow up with PSA prior )    Stephanie Clifford on 7/17/2023 at 11:55 AM

## 2023-07-17 NOTE — PROGRESS NOTES
Virtual Visit Details    Type of service:  Video Visit   Video Start Time: 12:35pm    CC: 79 yo male with prostate cancer s/p RALP with bladder stone removal 4/7/23.  PAthology from 4/7/23 shows Gl 3+4=7 [id number] focal pos mar.  Notes he continues to have some incontinence but it is improving.  Other aspects of urinatin have improved.  He is wearing 1-2 pads/day.  Has some improving ED.     OBJECTIVE:  PSA from 7/12/23 is 0.08 ng/mL          ASSESSMENT AND PLAN: Over half of today's 25-minute visit was spent reviewing the chart, results and counseling the patient regarding his prostate cancer.  PSA is very low but detectable.  Will repeat PSA in 3 months.  He will continue with pelvic floor exercises.      Video End Time:12:55pm    Originating Location (pt. Location): Home    Distant Location (provider location):  On-site  Platform used for Video Visit: Kushal

## 2023-07-28 ENCOUNTER — THERAPY VISIT (OUTPATIENT)
Dept: PHYSICAL THERAPY | Facility: CLINIC | Age: 78
End: 2023-07-28
Payer: COMMERCIAL

## 2023-07-28 DIAGNOSIS — N81.89 PELVIC FLOOR WEAKNESS: Primary | ICD-10-CM

## 2023-07-28 PROCEDURE — 97530 THERAPEUTIC ACTIVITIES: CPT | Mod: GP | Performed by: PHYSICAL THERAPIST

## 2023-07-28 PROCEDURE — 97110 THERAPEUTIC EXERCISES: CPT | Mod: GP | Performed by: PHYSICAL THERAPIST

## 2023-07-28 NOTE — PROGRESS NOTES
07/28/23 0500   Appointment Info   Signing clinician's name / credentials Jacklyn Juan DPT   Total/Authorized Visits 8 per PT   Visits Used 7   Medical Diagnosis pelvic floor dysfunction   PT Tx Diagnosis pelvic floor dysfunction   Quick Adds Certification   Progress Note/Certification   Start of Care Date 04/21/23   Onset of illness/injury or Date of Surgery 04/21/23   Therapy Frequency 2x a month   Predicted Duration 12 weeks   Certification date from 07/15/23   Certification date to 10/28/23   PT Goal 1   Goal Description Pt will wear no more than 1 pad in a 24 hour period.   Rationale to maximize safety and independence with performance of ADLs and functional tasks  (reduce incontinence)   Goal Progress able to do one brief in 24 hour periord or 2 guards/pads   Target Date 10/28/23   Subjective Report   Subjective Report He had his first PSA test which returned back with good results. He continues to get leaking when he is standing and moving about. Typically he will go walking without any leaking and then afterwards he will get some leaking. He does not leak on the bike but he will get a little bit afterward. At night he has hardly if any leaking. There were a few days where he was able to use one pad in 24 hour period. Daily exercise routine: kegels standing, elastic band exercises, stretching, arm weights with dumbbells, go on the bike (back to 28 minutes, adding a minute a day, was going for 40 minutes prior to surgery), then he will do another round of kegels and squats. He does not get any leaking with coughing/sneezing. He only gets leaking with standing and moving.   Objective Measures   Objective Measures Objective Measure 1;Objective Measure 2;Objective Measure 3;Objective Measure 4   Objective Measure 1   Objective Measure pad usage in 24 hour period   Details 1 brief, 1 guard pad, with decreased activity 1 pad, with more activity 3 pads   Objective Measure 2   Objective Measure nocturia    Details past 4-5 days: 0-1x   Objective Measure 3   Objective Measure frequency of voids during the day   Details every 2-4 hours during the day   Objective Measure 4   Objective Measure fluid intake   Details 4-5 cups of coffee, 50oz of water a day   Treatment Interventions (PT)   Interventions Therapeutic Activity;Therapeutic Procedure/Exercise   Therapeutic Procedure/Exercise   Therapeutic Procedures: strength, endurance, ROM, flexibillity minutes (54898) 15   Therapeutic Procedures Ther Proc 2;Ther Proc 3   Ther Proc 1 kegels   Ther Proc 1 - Details supine 5x5 second holds and 2x10 quick flicks, cues to reduce leg engagement   Ther Proc 2 standing kegels   Ther Proc 2 - Details 3x10 cues for kegel with exhlation, increased time due to difficulty coordinating   Ther Proc 3 paloff press blue TB   Ther Proc 3 - Details 2x15 reps cues for tra activaiton   PTRx Ther Proc 1 plank on knees   PTRx Ther Proc 1 - Details cues posterior pelvic tilt and tra activaiton 3x40 seconds   Skilled Intervention cueing   Patient Response/Progress demonstrated well   Therapeutic Activity   Therapeutic Activities: dynamic activities to improve functional performance minutes (61380) 25   Ther Act 1 Reviewed purpose of PT HEP and POC. Increased time educating pt on why certain exercises are helpful to reduce incontince. Discussed pt's progress in PT   Ther Act 1 - Details education   Skilled Intervention education/instruction   Patient Response/Progress verbalized understanding   Education   Learner/Method Patient   Plan   Home program ptrx   Updates to plan of care plank   Plan for next session assess   Total Session Time   Timed Code Treatment Minutes 40   Total Treatment Time (sum of timed and untimed services) 40         [facility]                                                                                   OUTPATIENT PHYSICAL THERAPY    PLAN OF TREATMENT FOR OUTPATIENT REHABILITATION    Patient's Last Name, First Name, Filemon Anguiano YOB: 1945   Provider's Name   [facility]   Medical Record No.  [id number]     Onset Date: 04/21/23  Start of Care Date: 04/21/23     Medical Diagnosis:  pelvic floor dysfunction      PT Treatment Diagnosis:  pelvic floor dysfunction Plan of Treatment  Frequency/Duration: 2x a month/ 12 weeks    Certification date from 07/15/23 to 10/28/23         See note for plan of treatment details and functional goals     Jacklyn Juan, PT                         I CERTIFY THE NEED FOR THESE SERVICES FURNISHED UNDER        THIS PLAN OF TREATMENT AND WHILE UNDER MY CARE     (Physician attestation of this document indicates review and certification of the therapy plan).                Referring Provider:  Royal Anthony*      Initial Assessment  See Epic Evaluation- Start of Care Date: 04/21/23

## 2023-10-11 ENCOUNTER — LAB (OUTPATIENT)
Dept: LAB | Facility: CLINIC | Age: 78
End: 2023-10-11
Payer: COMMERCIAL

## 2023-10-11 DIAGNOSIS — C61 PROSTATE CANCER (H): ICD-10-CM

## 2023-10-11 LAB — PSA SERPL DL<=0.01 NG/ML-MCNC: 0.03 NG/ML (ref 0–6.5)

## 2023-10-11 PROCEDURE — 36415 COLL VENOUS BLD VENIPUNCTURE: CPT | Performed by: PATHOLOGY

## 2023-10-11 PROCEDURE — 84153 ASSAY OF PSA TOTAL: CPT | Performed by: PATHOLOGY

## 2023-10-16 ENCOUNTER — PRE VISIT (OUTPATIENT)
Dept: UROLOGY | Facility: CLINIC | Age: 78
End: 2023-10-16
Payer: COMMERCIAL

## 2023-10-16 NOTE — PROGRESS NOTES
Subjective     REASON FOR VISIT  Prostate cancer follow up.    HISTORY OF PRESENT ILLNESS  Mr. Filemon Modi is a pleasant 78 year old gentleman with history of [place] 3+4 prostate cancer status post radical prostatectomy and cystolitholapaxy on 4/7/2023 with final pathology showing [id number] with focal positive margins.      Since his surgery, he has noted some stress incontinence that had been improving, as well as some post prostatectomy stress incontinence that had also been improving.  Unfortunately his PSA was detectable following prostatectomy but it was low at 0.08. Last seen by Dr. Escobedo virtually on 7/17/2023, at which time it was recommended he repeat a PSA in 3 months.    Today:  Erectile dysfunction has stayed stable, if not gotten a bit worse   Viagra does not work at all  Still uses a silicone ring  4-5/10 is best erection now with ring, 3-4/10 without  No problems with orgasm  Does endorse retrograde ejaculation   Post-prostatectomy stress incontinence is still problematic, 2 pads per day   Continues to do pelvic floor physical therapy exercises, but also very active with squats and biking and planks   Overall is not unhappy     Objective     PHYSICAL EXAM  Physical Exam  Constitutional:       Appearance: Normal appearance.   HENT:      Head: Normocephalic and atraumatic.      Nose: No congestion.      Mouth/Throat:      Mouth: Mucous membranes are moist.   Eyes:      General:         Right eye: No discharge.         Left eye: No discharge.   Pulmonary:      Effort: No respiratory distress.   Abdominal:      General: There is no distension.   Musculoskeletal:         General: No deformity.   Skin:     General: Skin is warm and dry.   Neurological:      Mental Status: He is alert and oriented to person, place, and time.   Psychiatric:         Mood and Affect: Mood normal.         Behavior: Behavior normal.       LABORATORY  Lab Results   Component Value Date    PSA 0.03 10/11/2023    PSA 0.08  07/12/2023    PSA 7.33 (H) 01/17/2023    PSA 8.53 (H) 05/04/2022    PSA 14.10 (H) 10/26/2021    PSA 14.50 (H) 04/21/2021     Final Diagnosis   A. PROSTATE, RADICAL RESECTION:  -Prostatic adenocarcinoma, grade group 2 ([place] score 3+4=7)  -Surgical margin is focally positive (right posterior), other margins are all negative  -Focal extraprostatic spread extension is present (right anterior)  -Perineural invasion is present, no evidence of lymphovascular spread    B. RIGHT PELVIC LYMPH NODES, DISSECTION:  -Five benign lymph nodes, negative for metastatic carcinoma (0/5)    C. LEFT PELVIC LYMPH NODES, DISSECTION:  -Two benign lymph nodes, negative for metastatic carcinoma (0/2)     IMAGING  Prostate MRI from 1/17/23    IMPRESSION:  1.  Based on the most suspicious abnormality, this exam is  characterized as PIRADS 5 - Clinically significant cancer is highly  likely to be present.  The most suspicious abnormality is stable  compared to 6/13/2021 and is located at the right mid gland peripheral  and transition zone and there is high suspicion of extraprostatic  extension.  2.  Additional lesion in the right apex peripheral and transition zone  characterized as PIRADS 4 - Clinically significant cancer is likely to  be present.  There is short segment capsular abutment with low  likelihood of minimal extraprostatic extension.  3.  Additional lesion in the right mid gland peripheral zone  characterized PIRADS 4 - Clinically significant cancer is likely to be  present. There minimal capsular abutment with no convincing evidence  of extraprostatic extension.  4.  No suspicious adenopathy.  5.  Focal observation in the left ischium, indeterminate, consider  bone scan for further evaluation.  6.  Increased size of now 2.1 cm right urinary bladder stone.    IMPRESSION  [place] 3+4 prostate cancer, [id number], positive margins with detectable PSA following RALP that does not meet criteria for PSA persistence or  recurrence  Erectile dysfunction after radical prostatectomy  Stress incontinence after radical prostatectomy    It was a pleasure to see Mr. Filemon Modi in clinic today in follow-up for his history of [place] 3+4 prostate cancer status-post radical prostatectomy with Dr. Escobedo on 4/7/2023 I am happy to let Filemon know that. After reviewing his most recent PSA, I am happy to let Filemon know that while his PSA is still technically detectable, the value has dropped from 0.08-0.03.  This represents quite a bit of stability for this detectable PSA, and with this and would recommend that he repeat a PSA in 3 months per guidelines with a repeat visit with me shortly afterwards.    In regards to his post prostatectomy erectile dysfunction and stress incontinence, I am glad to hear that overall he is not miserable with his current symptoms.  From an erection standpoint, I believe it is very reasonable for him to continue trialing the Viagra and seeing if there is any continued improvement over the next 6 months.  From a leakage standpoint, I strongly encouraged him to continue with the exercises provided by the pelvic floor physical therapy department as it is very possible he will see drastic improvement in the next 6 months as he continues to improve the strength of his external urinary sphincter.    Mr. Modi expressed understanding and agreement with the above discussion and plan and all of his questions were answered to his satisfaction.     PLAN  -- Repeat PSA in 3 months with office visit with me shortly afterwards  -- Continue on Viagra as needed for erections  -- Continue palpable physical therapy exercises    Signed by:    James Sifuentes PA-C    I spent a total of 30 minutes spent on the date of the encounter doing chart review, history and exam, documentation, and further activities as noted above.

## 2023-10-16 NOTE — TELEPHONE ENCOUNTER
Reason for visit: Prostate Cancer (H) Follow up      Records/imaging/labs/orders: 10/11 PSA, 07/12 PSA    Pt called: No need for a call    At Rooming: Renée Hodges MA  10/16/2023  8:06 AM

## 2023-10-17 ENCOUNTER — OFFICE VISIT (OUTPATIENT)
Dept: UROLOGY | Facility: CLINIC | Age: 78
End: 2023-10-17
Payer: COMMERCIAL

## 2023-10-17 VITALS — SYSTOLIC BLOOD PRESSURE: 168 MMHG | HEART RATE: 62 BPM | DIASTOLIC BLOOD PRESSURE: 84 MMHG

## 2023-10-17 DIAGNOSIS — C61 PROSTATE CANCER (H): Primary | ICD-10-CM

## 2023-10-17 PROCEDURE — 99214 OFFICE O/P EST MOD 30 MIN: CPT | Performed by: STUDENT IN AN ORGANIZED HEALTH CARE EDUCATION/TRAINING PROGRAM

## 2023-10-17 ASSESSMENT — PAIN SCALES - GENERAL: PAINLEVEL: NO PAIN (0)

## 2023-10-17 NOTE — NURSING NOTE
Chief Complaint   Patient presents with    RECHECK     3 month follow up        Blood pressure (!) 168/84, pulse 62. There is no height or weight on file to calculate BMI.    Patient Active Problem List   Diagnosis    Prostate cancer (H)    Stress incontinence after surgical procedure    Erectile dysfunction after radical prostatectomy       No Known Allergies    Current Outpatient Medications   Medication Sig Dispense Refill    acetaminophen (TYLENOL) 325 MG tablet Take 1-2 tablets (325-650 mg) by mouth every 6 hours as needed for mild pain 100 tablet 0    atorvastatin (LIPITOR) 20 MG tablet Take 20 mg by mouth At Bedtime      Brimonidine Tartrate 0.025 % SOLN Apply 1 drop to eye as needed      Cholecalciferol (VITAMIN D PO) Take 1,000 mg by mouth every morning       diphenhydrAMINE (BENADRYL) 25 MG capsule Take 25 mg by mouth every 6 hours as needed for itching or allergies      Levothyroxine Sodium (LEVOTHROID PO) Take 125 mcg by mouth every morning       sildenafil (REVATIO) 20 MG tablet Take 1 tab daily (Patient not taking: Reported on 10/17/2023) 90 tablet 11       Social History     Tobacco Use    Smoking status: Never    Smokeless tobacco: Never   Substance Use Topics    Alcohol use: Yes     Alcohol/week: 7.0 standard drinks of alcohol     Types: 7 Glasses of wine per week     Comment: wine daily    Drug use: Yes     Types: Marijuana     Comment: Daily       Priscilla Agarwal CMA  10/17/2023  7:33 AM

## 2023-10-17 NOTE — LETTER
10/17/2023       RE: Filemon Modi  [address] [facility] [address]     Dear Colleague,    Thank you for referring your patient, Filemon Modi, to the [facility] [place] [facility]. Please see a copy of my visit note below.    Subjective    REASON FOR VISIT  Prostate cancer follow up.    HISTORY OF PRESENT ILLNESS  Mr. Filemon Modi is a pleasant 78 year old gentleman with history of [place] 3+4 prostate cancer status post radical prostatectomy and cystolitholapaxy on 7/4/2023 with final pathology showing [id number] with focal positive margins.      Since his surgery, he has noted some stress incontinence that had been improving, as well as some post prostatectomy stress incontinence that had also been improving.  Unfortunately his PSA was detectable following prostatectomy but it was low at 0.08. Last seen by Dr. Escobedo virtually on 7/17/2023, at which time it was recommended he repeat a PSA in 3 months.    Today:  Erectile dysfunction has stayed stable, if not gotten a bit worse   Viagra does not work at all  Still uses a silicone ring  4-5/10 is best erection now with ring, 3-4/10 without  No problems with orgasm  Does endorse retrograde ejaculation   Post-prostatectomy stress incontinence is still problematic, 2 pads per day   Continues to do pelvic floor physical therapy exercises, but also very active with squats and biking and planks   Overall is not unhappy     Objective    PHYSICAL EXAM  Physical Exam  Constitutional:       Appearance: Normal appearance.   HENT:      Head: Normocephalic and atraumatic.      Nose: No congestion.      Mouth/Throat:      Mouth: Mucous membranes are moist.   Eyes:      General:         Right eye: No discharge.         Left eye: No discharge.   Pulmonary:      Effort: No respiratory distress.   Abdominal:      General: There is no distension.   Musculoskeletal:         General: No deformity.    Skin:     General: Skin is warm and dry.   Neurological:      Mental Status: He is alert and oriented to person, place, and time.   Psychiatric:         Mood and Affect: Mood normal.         Behavior: Behavior normal.       LABORATORY  Lab Results   Component Value Date    PSA 0.03 10/11/2023    PSA 0.08 07/12/2023    PSA 7.33 (H) 01/17/2023    PSA 8.53 (H) 05/04/2022    PSA 14.10 (H) 10/26/2021    PSA 14.50 (H) 04/21/2021     Final Diagnosis   A. PROSTATE, RADICAL RESECTION:  -Prostatic adenocarcinoma, grade group 2 ([place] score 3+4=7)  -Surgical margin is focally positive (right posterior), other margins are all negative  -Focal extraprostatic spread extension is present (right anterior)  -Perineural invasion is present, no evidence of lymphovascular spread    B. RIGHT PELVIC LYMPH NODES, DISSECTION:  -Five benign lymph nodes, negative for metastatic carcinoma (0/5)    C. LEFT PELVIC LYMPH NODES, DISSECTION:  -Two benign lymph nodes, negative for metastatic carcinoma (0/2)     IMAGING  Prostate MRI from 1/17/23    IMPRESSION:  1.  Based on the most suspicious abnormality, this exam is  characterized as PIRADS 5 - Clinically significant cancer is highly  likely to be present.  The most suspicious abnormality is stable  compared to 6/13/2021 and is located at the right mid gland peripheral  and transition zone and there is high suspicion of extraprostatic  extension.  2.  Additional lesion in the right apex peripheral and transition zone  characterized as PIRADS 4 - Clinically significant cancer is likely to  be present.  There is short segment capsular abutment with low  likelihood of minimal extraprostatic extension.  3.  Additional lesion in the right mid gland peripheral zone  characterized PIRADS 4 - Clinically significant cancer is likely to be  present. There minimal capsular abutment with no convincing evidence  of extraprostatic extension.  4.  No suspicious adenopathy.  5.  Focal observation in the  left ischium, indeterminate, consider  bone scan for further evaluation.  6.  Increased size of now 2.1 cm right urinary bladder stone.    IMPRESSION  [place] 3+4 prostate cancer, [id number], positive margins with detectable PSA following RALP that does not meet criteria for PSA persistence or recurrence  Erectile dysfunction after radical prostatectomy  Stress incontinence after radical prostatectomy    It was a pleasure to see . Filemon Modi in clinic today in follow-up for his history of [place] 3+4 prostate cancer status-post radical prostatectomy with Dr. Escobedo on 7/4/2023 I am happy to let Filemon know that. After reviewing his most recent PSA, I am happy to let Filemon know that while his PSA is still technically detectable, the value has dropped from 0.08-0.03.  This represents quite a bit of stability for this detectable PSA, and with this and would recommend that he repeat a PSA in 3 months per guidelines with a repeat visit with me shortly afterwards.    In regards to his post prostatectomy erectile dysfunction and stress incontinence, I am glad to hear that overall he is not miserable with his current symptoms.  From an erection standpoint, I believe it is very reasonable for him to continue trialing the Viagra and seeing if there is any continued improvement over the next 6 months.  From a leakage standpoint, I strongly encouraged him to continue with the exercises provided by the pelvic floor physical therapy department as it is very possible he will see drastic improvement in the next 6 months as he continues to improve the strength of his external urinary sphincter.    Mr. Modi expressed understanding and agreement with the above discussion and plan and all of his questions were answered to his satisfaction.     PLAN  -- Repeat PSA in 3 months with office visit with me shortly afterwards  -- Continue on Viagra as needed for erections  -- Continue palpable physical therapy exercises    Signed  by:    James Sifuentes PA-C    I spent a total of 30 minutes spent on the date of the encounter doing chart review, history and exam, documentation, and further activities as noted above.

## 2024-01-10 DIAGNOSIS — H35.373 EPIRETINAL MEMBRANE (ERM) OF BOTH EYES: Primary | ICD-10-CM

## 2024-01-16 ENCOUNTER — LAB (OUTPATIENT)
Dept: LAB | Facility: CLINIC | Age: 79
End: 2024-01-16
Payer: COMMERCIAL

## 2024-01-16 DIAGNOSIS — C61 PROSTATE CANCER (H): ICD-10-CM

## 2024-01-16 LAB — PSA SERPL DL<=0.01 NG/ML-MCNC: 0.09 NG/ML (ref 0–6.5)

## 2024-01-16 PROCEDURE — 36415 COLL VENOUS BLD VENIPUNCTURE: CPT | Performed by: PATHOLOGY

## 2024-01-16 PROCEDURE — 84153 ASSAY OF PSA TOTAL: CPT | Performed by: PATHOLOGY

## 2024-01-19 ENCOUNTER — OFFICE VISIT (OUTPATIENT)
Dept: OPHTHALMOLOGY | Facility: CLINIC | Age: 79
End: 2024-01-19
Attending: OPHTHALMOLOGY
Payer: COMMERCIAL

## 2024-01-19 DIAGNOSIS — H35.373 EPIRETINAL MEMBRANE (ERM) OF BOTH EYES: ICD-10-CM

## 2024-01-19 PROCEDURE — G0463 HOSPITAL OUTPT CLINIC VISIT: HCPCS | Performed by: OPHTHALMOLOGY

## 2024-01-19 PROCEDURE — 99214 OFFICE O/P EST MOD 30 MIN: CPT | Performed by: OPHTHALMOLOGY

## 2024-01-19 PROCEDURE — 92134 CPTRZ OPH DX IMG PST SGM RTA: CPT | Performed by: OPHTHALMOLOGY

## 2024-01-19 ASSESSMENT — REFRACTION_WEARINGRX
OD_ADD: +2.50
OD_AXIS: 085
OS_ADD: +2.50
OD_SPHERE: -2.25
OS_SPHERE: -4.75
OD_CYLINDER: +0.50
OS_CYLINDER: +1.75
OS_AXIS: 125

## 2024-01-19 ASSESSMENT — REFRACTION_MANIFEST
OS_SPHERE: -3.75
OD_SPHERE: -2.25
OS_CYLINDER: +1.75
OD_ADD: +2.50
OS_ADD: +2.50
OD_CYLINDER: +0.50
OD_AXIS: 090
OS_AXIS: 115

## 2024-01-19 ASSESSMENT — CONF VISUAL FIELD
OD_SUPERIOR_TEMPORAL_RESTRICTION: 0
OD_SUPERIOR_NASAL_RESTRICTION: 0
OD_NORMAL: 1
OD_INFERIOR_NASAL_RESTRICTION: 0
OS_NORMAL: 1
OS_INFERIOR_TEMPORAL_RESTRICTION: 0
OS_INFERIOR_NASAL_RESTRICTION: 0
OS_SUPERIOR_NASAL_RESTRICTION: 0
OS_SUPERIOR_TEMPORAL_RESTRICTION: 0
OD_INFERIOR_TEMPORAL_RESTRICTION: 0

## 2024-01-19 ASSESSMENT — VISUAL ACUITY
OS_CC+: -1
OS_CC: 20/50
CORRECTION_TYPE: GLASSES
METHOD: SNELLEN - LINEAR
OD_CC: 20/20
OD_CC+: -1

## 2024-01-19 ASSESSMENT — SLIT LAMP EXAM - LIDS
COMMENTS: NORMAL
COMMENTS: NORMAL

## 2024-01-19 ASSESSMENT — CUP TO DISC RATIO
OD_RATIO: 0.3
OS_RATIO: 0.2

## 2024-01-19 ASSESSMENT — TONOMETRY
IOP_METHOD: TONOPEN
OS_IOP_MMHG: 14
OD_IOP_MMHG: 14

## 2024-01-19 ASSESSMENT — EXTERNAL EXAM - RIGHT EYE: OD_EXAM: NORMAL

## 2024-01-19 ASSESSMENT — EXTERNAL EXAM - LEFT EYE: OS_EXAM: NORMAL

## 2024-01-19 NOTE — NURSING NOTE
Chief Complaints and History of Present Illnesses   Patient presents with    Follow Up     Epiretinal membrane (ERM) of both eyes         Chief Complaint(s) and History of Present Illness(es)       Follow Up              Comments: Epiretinal membrane (ERM) of both eyes                  Comments    Pt would like RX for new glasses   States no flashes or floaters   No eye pain or redness    Beti Sommer COT 7:18 AM January 19, 2024

## 2024-01-19 NOTE — PROGRESS NOTES
CC -   H/o RD OU    INTERVAL HISTORY - No changes since PORTIA with me, not bothered by floaters    PMH -   Filemon Modi is a  78 year old  patient with history of retinal detachment repair both eyes with SBP + small gas bubble.  OS VA worse than OD all life, unsure if ever correctable to 20/20, large anisometropia.      PAST OCULAR SURGERY  SBP OS 2006 ([facility])  SBP OD 2011 ([facility])  CE/IOL OU  2019 ([facility])      RETINAL IMAGING:  OCT  01/19/2024   OD - tr ERM, PVD, stable   OS - tr ERM, focal outer irregular, PVD, stable       ASSESSMENT & PLAN    #. H/o RD repair OU with SBP + gas bubble   - 2006 OS & 2011 OD   - retina flat today     #. tr ERM OU   - trace on OCT   - NVS   - Good vision acuity.       # ?Dry AMD OS   - focal outer irregular OS on OCT 10/2022    - regular veggie consumption   - could consider AREDS   - no h/o smoking      #.  PVD OU   - S/Sx RD d/w patient 1/2024    #. Pseudophakia each eye    - Cataract surgery in 2019   - doing well. VAcc 20/20, 20/25    #  Anisometropia   - per patient prior WRx doesn't incorporate full OS correction   - less after CE/IOL    return to clinic: 1 year, OCT MAC, VTD          ATTESTATION     Attending Attestation:     Complete documentation of historical and exam elements from today's encounter can be found in the full encounter summary report (not reduplicated in this progress note).  I personally obtained the chief complaint(s) and history of present illness.  I confirmed and edited as necessary the review of systems, past medical/surgical history, family history, social history, and examination findings as documented by others; and I examined the patient myself.  I personally reviewed the relevant tests, images, and reports as documented above.  I formulated and edited as necessary the assessment and plan and discussed the findings and management plan with the patient and family    Nicolette Lipscomb MD, PhD  , Vitreoretinal Surgery  Department of  Ophthalmology  [facility]

## 2024-01-22 NOTE — PROGRESS NOTES
"Subjective     REASON FOR VISIT  Prostate cancer follow up    HISTORY OF PRESENT ILLNESS  Mr. Filemon Modi is a pleasant 78 year old gentleman with history of [place] 3+4 prostate cancer status post radical prostatectomy and cystolitholapaxy on 4/7/2023 with final pathology showing [id number] with focal positive margins.      Since his surgery, he has noted some stress incontinence that has been improving.  Also has struggled with erections following prostatectomy despite using Viagra and a silicone ring -best erection is 5 out of 10 with these interventions.  Unfortunately his PSA was detectable following prostatectomy but it was low at 0.08. Last seen by me in the office on 10/17/23, at which time his PSA dropped from 0.08 to 0.03. with this in mind, recommended standard PSA follow-up in 3 months, continuing Viagra as needed for erectile dysfunction, and continuing with pelvic floor physical therapy.     Today:  Has continued to do the pelvic floor exercises and feels his post-prostatectomy stress incontinence has been slowly improving   Nighttime leakage is now \"very light\"   Daytime leakage is decreasing noticeably - still all with activity  Feels his overall fitness has improved since doing the physical therapy    Erectile dysfunction is about the same   Still able to orgasm  Viagra does not work at all    Objective     PHYSICAL EXAM  Physical Exam  Constitutional:       Appearance: Normal appearance.   HENT:      Head: Normocephalic and atraumatic.      Nose: No congestion.      Mouth/Throat:      Mouth: Mucous membranes are moist.   Eyes:      General:         Right eye: No discharge.         Left eye: No discharge.   Pulmonary:      Effort: No respiratory distress.   Abdominal:      General: There is no distension.   Musculoskeletal:         General: No deformity.   Skin:     General: Skin is warm and dry.   Neurological:      Mental Status: He is alert and oriented to person, place, and time.   Psychiatric:   "       Mood and Affect: Mood normal.         Behavior: Behavior normal.       LABORATORY  Lab Results   Component Value Date    PSA 0.09 01/16/2024    PSA 0.03 10/11/2023    PSA 0.08 07/12/2023    PSA 7.33 (H) 01/17/2023    PSA 8.53 (H) 05/04/2022    PSA 14.10 (H) 10/26/2021    PSA 14.50 (H) 04/21/2021     Final Diagnosis   A. PROSTATE, RADICAL RESECTION:  -Prostatic adenocarcinoma, grade group 2 ([place] score 3+4=7)  -Surgical margin is focally positive (right posterior), other margins are all negative  -Focal extraprostatic spread extension is present (right anterior)  -Perineural invasion is present, no evidence of lymphovascular spread    B. RIGHT PELVIC LYMPH NODES, DISSECTION:  -Five benign lymph nodes, negative for metastatic carcinoma (0/5)    C. LEFT PELVIC LYMPH NODES, DISSECTION:  -Two benign lymph nodes, negative for metastatic carcinoma (0/2)     IMAGING  Prostate MRI from 1/17/23    IMPRESSION:  1.  Based on the most suspicious abnormality, this exam is  characterized as PIRADS 5 - Clinically significant cancer is highly  likely to be present.  The most suspicious abnormality is stable  compared to 6/13/2021 and is located at the right mid gland peripheral  and transition zone and there is high suspicion of extraprostatic  extension.  2.  Additional lesion in the right apex peripheral and transition zone  characterized as PIRADS 4 - Clinically significant cancer is likely to  be present.  There is short segment capsular abutment with low  likelihood of minimal extraprostatic extension.  3.  Additional lesion in the right mid gland peripheral zone  characterized PIRADS 4 - Clinically significant cancer is likely to be  present. There minimal capsular abutment with no convincing evidence  of extraprostatic extension.  4.  No suspicious adenopathy.  5.  Focal observation in the left ischium, indeterminate, consider  bone scan for further evaluation.  6.  Increased size of now 2.1 cm right urinary bladder  stone.    IMPRESSION  [place] 3+4 prostate cancer, [id number], positive margins with detectable PSA following RALP that does not meet criteria for PSA persistence or recurrence  Erectile dysfunction after radical prostatectomy  Stress incontinence after radical prostatectomy    It was a pleasure to see . Filemon Modi in clinic today in follow-up for his history of [place] 3+4 prostate cancer status-post radical prostatectomy with Dr. Escobedo on 4/7/2023.  After reviewing his clinical history, we discussed that his PSA unfortunately has increased slightly from his previous values up to 0.09.  However, this is still well below the concerning level of 0.2, so I would continue to recommend standard follow-up with a repeat PSA in 3 months with a follow-up visit with me afterwards.    While Filemon is still experiencing post prostatectomy stress incontinence and erectile dysfunction, I am glad to hear that overall he feels that both are stable and that he is in a good spot with both.  I think it is reasonable for him to continue working with the pelvic floor exercises to improve stress incontinence, and also that we discontinue any further talks of his post prostatectomy erectile dysfunction unless it were to become more bothersome.    Filemon asked multiple insightful questions and ultimately expressed understanding and agreement moving forward with a repeat PSA in 3 months with a visit with me shortly afterwards.    PLAN  -- Repeat PSA in 3 months with office visit with me shortly afterwards  -- Continue with pelvic floor physical therapy for post prostatectomy stress incontinence    Signed by:    James Sifuentes PA-C    I spent a total of 27 minutes spent on the date of the encounter doing chart review, history and exam, documentation, and further activities as noted above.

## 2024-01-23 ENCOUNTER — OFFICE VISIT (OUTPATIENT)
Dept: UROLOGY | Facility: CLINIC | Age: 79
End: 2024-01-23
Payer: COMMERCIAL

## 2024-01-23 VITALS — DIASTOLIC BLOOD PRESSURE: 85 MMHG | SYSTOLIC BLOOD PRESSURE: 146 MMHG | HEART RATE: 63 BPM

## 2024-01-23 DIAGNOSIS — N39.3 STRESS INCONTINENCE AFTER SURGICAL PROCEDURE: ICD-10-CM

## 2024-01-23 DIAGNOSIS — N99.89 STRESS INCONTINENCE AFTER SURGICAL PROCEDURE: ICD-10-CM

## 2024-01-23 DIAGNOSIS — N52.31 ERECTILE DYSFUNCTION AFTER RADICAL PROSTATECTOMY: ICD-10-CM

## 2024-01-23 DIAGNOSIS — C61 PROSTATE CANCER (H): Primary | ICD-10-CM

## 2024-01-23 PROCEDURE — 99213 OFFICE O/P EST LOW 20 MIN: CPT | Performed by: STUDENT IN AN ORGANIZED HEALTH CARE EDUCATION/TRAINING PROGRAM

## 2024-01-23 ASSESSMENT — PAIN SCALES - GENERAL: PAINLEVEL: NO PAIN (0)

## 2024-01-23 NOTE — NURSING NOTE
Chief Complaint   Patient presents with    RECHECK     Three month PSA check for a history of prostate cancer       Blood pressure (!) 146/85, pulse 63. There is no height or weight on file to calculate BMI.    Patient Active Problem List   Diagnosis    Prostate cancer (H)    Stress incontinence after surgical procedure    Erectile dysfunction after radical prostatectomy       No Known Allergies    Current Outpatient Medications   Medication Sig Dispense Refill    acetaminophen (TYLENOL) 325 MG tablet Take 1-2 tablets (325-650 mg) by mouth every 6 hours as needed for mild pain 100 tablet 0    atorvastatin (LIPITOR) 20 MG tablet Take 20 mg by mouth At Bedtime      Brimonidine Tartrate 0.025 % SOLN Apply 1 drop to eye as needed      Cholecalciferol (VITAMIN D PO) Take 1,000 mg by mouth every morning       diphenhydrAMINE (BENADRYL) 25 MG capsule Take 25 mg by mouth every 6 hours as needed for itching or allergies      Levothyroxine Sodium (LEVOTHROID PO) Take 125 mcg by mouth every morning       sildenafil (REVATIO) 20 MG tablet Take 1 tab daily (Patient not taking: Reported on 10/17/2023) 90 tablet 11       Social History     Tobacco Use    Smoking status: Never    Smokeless tobacco: Never   Substance Use Topics    Alcohol use: Yes     Alcohol/week: 7.0 standard drinks of alcohol     Types: 7 Glasses of wine per week     Comment: wine daily    Drug use: Yes     Types: Marijuana     Comment: Daily       Priscilla Agarwal CMA  1/23/2024  7:30 AM

## 2024-01-23 NOTE — LETTER
"1/23/2024       RE: Filemon Modi  [address] [facility] [address]     Dear Colleague,    Thank you for referring your patient, Filemon Modi, to the [facility] [place] at [facility]. Please see a copy of my visit note below.    Subjective    REASON FOR VISIT  Prostate cancer follow up    HISTORY OF PRESENT ILLNESS  Mr. Filemon Modi is a pleasant 78 year old gentleman with history of [place] 3+4 prostate cancer status post radical prostatectomy and cystolitholapaxy on 7/4/2023 with final pathology showing [id number] with focal positive margins.      Since his surgery, he has noted some stress incontinence that has been improving.  Also has struggled with erections following prostatectomy despite using Viagra and a silicone ring -best erection is 5 out of 10 with these interventions.  Unfortunately his PSA was detectable following prostatectomy but it was low at 0.08. Last seen by me in the office on 10/17/23, at which time his PSA dropped from 0.08 to 0.03. with this in mind, recommended standard PSA follow-up in 3 months, continuing Viagra as needed for erectile dysfunction, and continuing with pelvic floor physical therapy.     Today:  Has continued to do the pelvic floor exercises and feels his post-prostatectomy stress incontinence has been slowly improving   Nighttime leakage is now \"very light\"   Daytime leakage is decreasing noticeably - still all with activity  Feels his overall fitness has improved since doing the physical therapy    Erectile dysfunction is about the same   Still able to orgasm  Viagra does not work at all    Objective    PHYSICAL EXAM  Physical Exam  Constitutional:       Appearance: Normal appearance.   HENT:      Head: Normocephalic and atraumatic.      Nose: No congestion.      Mouth/Throat:      Mouth: Mucous membranes are moist.   Eyes:      General:         Right eye: No discharge.         Left eye: " No discharge.   Pulmonary:      Effort: No respiratory distress.   Abdominal:      General: There is no distension.   Musculoskeletal:         General: No deformity.   Skin:     General: Skin is warm and dry.   Neurological:      Mental Status: He is alert and oriented to person, place, and time.   Psychiatric:         Mood and Affect: Mood normal.         Behavior: Behavior normal.       LABORATORY  Lab Results   Component Value Date    PSA 0.09 01/16/2024    PSA 0.03 10/11/2023    PSA 0.08 07/12/2023    PSA 7.33 (H) 01/17/2023    PSA 8.53 (H) 05/04/2022    PSA 14.10 (H) 10/26/2021    PSA 14.50 (H) 04/21/2021     Final Diagnosis   A. PROSTATE, RADICAL RESECTION:  -Prostatic adenocarcinoma, grade group 2 ([place] score 3+4=7)  -Surgical margin is focally positive (right posterior), other margins are all negative  -Focal extraprostatic spread extension is present (right anterior)  -Perineural invasion is present, no evidence of lymphovascular spread    B. RIGHT PELVIC LYMPH NODES, DISSECTION:  -Five benign lymph nodes, negative for metastatic carcinoma (0/5)    C. LEFT PELVIC LYMPH NODES, DISSECTION:  -Two benign lymph nodes, negative for metastatic carcinoma (0/2)     IMAGING  Prostate MRI from 1/17/23    IMPRESSION:  1.  Based on the most suspicious abnormality, this exam is  characterized as PIRADS 5 - Clinically significant cancer is highly  likely to be present.  The most suspicious abnormality is stable  compared to 6/13/2021 and is located at the right mid gland peripheral  and transition zone and there is high suspicion of extraprostatic  extension.  2.  Additional lesion in the right apex peripheral and transition zone  characterized as PIRADS 4 - Clinically significant cancer is likely to  be present.  There is short segment capsular abutment with low  likelihood of minimal extraprostatic extension.  3.  Additional lesion in the right mid gland peripheral zone  characterized PIRADS 4 - Clinically  significant cancer is likely to be  present. There minimal capsular abutment with no convincing evidence  of extraprostatic extension.  4.  No suspicious adenopathy.  5.  Focal observation in the left ischium, indeterminate, consider  bone scan for further evaluation.  6.  Increased size of now 2.1 cm right urinary bladder stone.    IMPRESSION  [place] 3+4 prostate cancer, [id number], positive margins with detectable PSA following RALP that does not meet criteria for PSA persistence or recurrence  Erectile dysfunction after radical prostatectomy  Stress incontinence after radical prostatectomy    It was a pleasure to see . Filemon Modi in clinic today in follow-up for his history of [place] 3+4 prostate cancer status-post radical prostatectomy with Dr. Escobedo on 7/4/2023.  After reviewing his clinical history, we discussed that his PSA unfortunately has increased slightly from his previous values up to 0.09.  However, this is still well below the concerning level of 0.2, so I would continue to recommend standard follow-up with a repeat PSA in 3 months with a follow-up visit with me afterwards.    While Filemon is still experiencing post prostatectomy stress incontinence and erectile dysfunction, I am glad to hear that overall he feels that both are stable and that he is in a good spot with both.  I think it is reasonable for him to continue working with the pelvic floor exercises to improve stress incontinence, and also that we discontinue any further talks of his post prostatectomy erectile dysfunction unless it were to become more bothersome.    Filemon asked multiple insightful questions and ultimately expressed understanding and agreement moving forward with a repeat PSA in 3 months with a visit with me shortly afterwards.    PLAN  -- Repeat PSA in 3 months with office visit with me shortly afterwards  -- Continue with pelvic floor physical therapy for post prostatectomy stress incontinence    Signed by:    James  GORDON Sifuentes PA-C    I spent a total of 27 minutes spent on the date of the encounter doing chart review, history and exam, documentation, and further activities as noted above.

## 2024-04-16 ENCOUNTER — LAB (OUTPATIENT)
Dept: LAB | Facility: CLINIC | Age: 79
End: 2024-04-16
Payer: COMMERCIAL

## 2024-04-16 DIAGNOSIS — C61 PROSTATE CANCER (H): ICD-10-CM

## 2024-04-16 LAB — PSA SERPL DL<=0.01 NG/ML-MCNC: 0.13 NG/ML (ref 0–6.5)

## 2024-04-16 PROCEDURE — 84153 ASSAY OF PSA TOTAL: CPT | Performed by: PATHOLOGY

## 2024-04-16 PROCEDURE — 36415 COLL VENOUS BLD VENIPUNCTURE: CPT | Performed by: PATHOLOGY

## 2024-04-17 ENCOUNTER — PRE VISIT (OUTPATIENT)
Dept: UROLOGY | Facility: CLINIC | Age: 79
End: 2024-04-17
Payer: COMMERCIAL

## 2024-04-17 NOTE — TELEPHONE ENCOUNTER
Reason for visit: Repeat 3 month PSA     Relevant information: Is being seen here for prostate cancer, ED after prostatectomy via [place], stress incontinence after surgical procedure, and BPH with LUTS    Records/imaging/labs/orders: all records available    Pt called: no need for a call    At Rooming: have pt empty bladder/pvr,  Standard rooming      William Anton  4/17/2024  9:03 AM

## 2024-04-19 NOTE — PROGRESS NOTES
Subjective     REASON FOR VISIT  Prostate cancer follow up    HISTORY OF PRESENT ILLNESS  Mr. Filemon Modi is a pleasant 79 year old gentleman with history of [place] 3+4 prostate cancer status post radical prostatectomy and cystolitholapaxy on 4/7/2023 with final pathology showing [id number] with focal positive margins.      Since his surgery, he has noted some stress incontinence that has been improving.  Also has struggled with erections following prostatectomy despite using Viagra and a silicone ring -best erection is 5 out of 10 with these interventions.  Unfortunately his PSA was detectable following prostatectomy but it was low at 0.08. Last seen by me in the office on 1/23/24, at which time his PSA increased from 0.03 to 0.09. With this in mind, recommended PSA follow-up in 3 months, discontinuation of Viagra for post-prostatectomy erectile dysfunction given its lack of efficacy, and continuing with pelvic floor physical therapy.     Today:  Post-prostatectomy stress incontinence still slightly improving   Only utilizing the lightest Depends pad, one during the day  Still can be slightly damp at night, one pad at night  Is comfortable using these pads the rest of his life   Post-prostatectomy erectile dysfunction is very stable    Objective     PHYSICAL EXAM  Physical Exam  Constitutional:       Appearance: Normal appearance.   HENT:      Head: Normocephalic and atraumatic.      Nose: No congestion.      Mouth/Throat:      Mouth: Mucous membranes are moist.   Eyes:      General:         Right eye: No discharge.         Left eye: No discharge.   Pulmonary:      Effort: No respiratory distress.   Abdominal:      General: There is no distension.   Musculoskeletal:         General: No deformity.   Skin:     General: Skin is warm and dry.   Neurological:      Mental Status: He is alert and oriented to person, place, and time.   Psychiatric:         Mood and Affect: Mood normal.         Behavior: Behavior normal.        LABORATORY  Lab Results   Component Value Date    PSA 0.13 04/16/2024    PSA 0.09 01/16/2024    PSA 0.03 10/11/2023    PSA 0.08 07/12/2023    PSA 7.33 (H) 01/17/2023    PSA 8.53 (H) 05/04/2022    PSA 14.10 (H) 10/26/2021    PSA 14.50 (H) 04/21/2021     Final Diagnosis   A. PROSTATE, RADICAL RESECTION:  -Prostatic adenocarcinoma, grade group 2 ([place] score 3+4=7)  -Surgical margin is focally positive (right posterior), other margins are all negative  -Focal extraprostatic spread extension is present (right anterior)  -Perineural invasion is present, no evidence of lymphovascular spread    B. RIGHT PELVIC LYMPH NODES, DISSECTION:  -Five benign lymph nodes, negative for metastatic carcinoma (0/5)    C. LEFT PELVIC LYMPH NODES, DISSECTION:  -Two benign lymph nodes, negative for metastatic carcinoma (0/2)     IMAGING  Prostate MRI from 1/17/23    IMPRESSION:  1.  Based on the most suspicious abnormality, this exam is  characterized as PIRADS 5 - Clinically significant cancer is highly  likely to be present.  The most suspicious abnormality is stable  compared to 6/13/2021 and is located at the right mid gland peripheral  and transition zone and there is high suspicion of extraprostatic  extension.  2.  Additional lesion in the right apex peripheral and transition zone  characterized as PIRADS 4 - Clinically significant cancer is likely to  be present.  There is short segment capsular abutment with low  likelihood of minimal extraprostatic extension.  3.  Additional lesion in the right mid gland peripheral zone  characterized PIRADS 4 - Clinically significant cancer is likely to be  present. There minimal capsular abutment with no convincing evidence  of extraprostatic extension.  4.  No suspicious adenopathy.  5.  Focal observation in the left ischium, indeterminate, consider  bone scan for further evaluation.  6.  Increased size of now 2.1 cm right urinary bladder stone.    IMPRESSION  [place] 3+4 prostate  cancer, [id number], positive margins with detectable PSA following RALP that does not meet criteria for PSA persistence or recurrence  Erectile dysfunction after radical prostatectomy  Stress incontinence after radical prostatectomy    It was a pleasure to see . Filemon Modi in clinic today in follow-up for his history of [place] 3+4 prostate cancer status-post radical prostatectomy with Dr. Escobedo on 4/7/2023.  After reviewing his clinical history, we discussed that his PSA has once again gone up, though it is still under the threshold for true recurrence.  We discussed that we cannot accurately get a doubling time until his PSA actually gets to an crests point to, so we will not be able to give any accurate information on this until that time.  However, given the continued rise it would be best for us to get a repeat PSA again at this 3-month interval as it is possible he will get very close to or crest that point to value at his next appointment.  We then again reviewed next steps in regards to a referral to radiation oncology for discussion of continued PSA surveillance until a PSMA PET scan can be considered.    I am glad to hear that his post prostatectomy stress incontinence and erectile dysfunction are stable and that he still feels that these do not need to be intervened upon and that his situation is tolerable for the remainder of his life.  I will leave it to him to let me know if he would like any further intervention for these findings.    Mr. Modi expressed understanding and agreement to the above discussion and plan and all of his questions were answered to his satisfaction.     PLAN  Repeat PSA in 3 months with follow-up office visit with me shortly afterwards to discuss results    Signed by:    James Sifuentes PA-C    I spent a total of 28 minutes spent on the date of the encounter doing chart review, history and exam, documentation, and further activities as noted above.

## 2024-04-23 ENCOUNTER — OFFICE VISIT (OUTPATIENT)
Dept: UROLOGY | Facility: CLINIC | Age: 79
End: 2024-04-23
Payer: COMMERCIAL

## 2024-04-23 VITALS — DIASTOLIC BLOOD PRESSURE: 81 MMHG | SYSTOLIC BLOOD PRESSURE: 130 MMHG | HEART RATE: 70 BPM

## 2024-04-23 DIAGNOSIS — N99.89 STRESS INCONTINENCE AFTER SURGICAL PROCEDURE: ICD-10-CM

## 2024-04-23 DIAGNOSIS — N39.3 STRESS INCONTINENCE AFTER SURGICAL PROCEDURE: ICD-10-CM

## 2024-04-23 DIAGNOSIS — R97.21 RISING PSA FOLLOWING TREATMENT FOR MALIGNANT NEOPLASM OF PROSTATE: Primary | ICD-10-CM

## 2024-04-23 DIAGNOSIS — N52.31 ERECTILE DYSFUNCTION AFTER RADICAL PROSTATECTOMY: ICD-10-CM

## 2024-04-23 PROCEDURE — 99213 OFFICE O/P EST LOW 20 MIN: CPT | Performed by: STUDENT IN AN ORGANIZED HEALTH CARE EDUCATION/TRAINING PROGRAM

## 2024-04-23 ASSESSMENT — PAIN SCALES - GENERAL: PAINLEVEL: NO PAIN (0)

## 2024-04-23 NOTE — NURSING NOTE
Chief Complaint   Patient presents with    RECHECK     Prostate cancer follow up        Blood pressure 130/81, pulse 70. There is no height or weight on file to calculate BMI.    Patient Active Problem List   Diagnosis    Prostate cancer (H)    Stress incontinence after surgical procedure    Erectile dysfunction after radical prostatectomy       No Known Allergies    Current Outpatient Medications   Medication Sig Dispense Refill    acetaminophen (TYLENOL) 325 MG tablet Take 1-2 tablets (325-650 mg) by mouth every 6 hours as needed for mild pain 100 tablet 0    atorvastatin (LIPITOR) 20 MG tablet Take 20 mg by mouth At Bedtime      Brimonidine Tartrate 0.025 % SOLN Apply 1 drop to eye as needed      Cholecalciferol (VITAMIN D PO) Take 1,000 mg by mouth every morning       diphenhydrAMINE (BENADRYL) 25 MG capsule Take 25 mg by mouth every 6 hours as needed for itching or allergies      Levothyroxine Sodium (LEVOTHROID PO) Take 125 mcg by mouth every morning       sildenafil (REVATIO) 20 MG tablet Take 1 tab daily 90 tablet 11       Social History     Tobacco Use    Smoking status: Never    Smokeless tobacco: Never   Substance Use Topics    Alcohol use: Yes     Alcohol/week: 7.0 standard drinks of alcohol     Types: 7 Glasses of wine per week     Comment: wine daily    Drug use: Yes     Types: Marijuana     Comment: Daily       Priscilla Agarwal CMA  4/23/2024  7:28 AM

## 2024-04-23 NOTE — LETTER
4/23/2024       RE: Filemon Modi  [address] [facility] [address]     Dear Colleague,    Thank you for referring your patient, Filemon Modi, to the [facility] [place] at [facility]. Please see a copy of my visit note below.    Subjective    REASON FOR VISIT  Prostate cancer follow up    HISTORY OF PRESENT ILLNESS  Mr. Filemon Modi is a pleasant 79 year old gentleman with history of [place] 3+4 prostate cancer status post radical prostatectomy and cystolitholapaxy on 4/7/2023 with final pathology showing [id number] with focal positive margins.      Since his surgery, he has noted some stress incontinence that has been improving.  Also has struggled with erections following prostatectomy despite using Viagra and a silicone ring -best erection is 5 out of 10 with these interventions.  Unfortunately his PSA was detectable following prostatectomy but it was low at 0.08. Last seen by me in the office on 1/23/24, at which time his PSA increased from 0.03 to 0.09. With this in mind, recommended PSA follow-up in 3 months, discontinuation of Viagra for post-prostatectomy erectile dysfunction given its lack of efficacy, and continuing with pelvic floor physical therapy.     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   HENT:      Head: Normocephalic and atraumatic.      Nose: No congestion.      Mouth/Throat:      Mouth: Mucous membranes are moist.   Eyes:      General:         Right eye: No discharge.         Left eye: No discharge.   Pulmonary:      Effort: No respiratory distress.    Abdominal:      General: There is no distension.   Musculoskeletal:         General: No deformity.   Skin:     General: Skin is warm and dry.   Neurological:      Mental Status: He is alert and oriented to person, place, and time.   Psychiatric:         Mood and Affect: Mood normal.         Behavior: Behavior normal.       LABORATORY  Lab Results   Component Value Date    PSA 0.13 04/16/2024    PSA 0.09 01/16/2024    PSA 0.03 10/11/2023    PSA 0.08 07/12/2023    PSA 7.33 (H) 01/17/2023    PSA 8.53 (H) 05/04/2022    PSA 14.10 (H) 10/26/2021    PSA 14.50 (H) 04/21/2021     Final Diagnosis   A. PROSTATE, RADICAL RESECTION:  -Prostatic adenocarcinoma, grade group 2 ([place] score 3+4=7)  -Surgical margin is focally positive (right posterior), other margins are all negative  -Focal extraprostatic spread extension is present (right anterior)  -Perineural invasion is present, no evidence of lymphovascular spread    B. RIGHT PELVIC LYMPH NODES, DISSECTION:  -Five benign lymph nodes, negative for metastatic carcinoma (0/5)    C. LEFT PELVIC LYMPH NODES, DISSECTION:  -Two benign lymph nodes, negative for metastatic carcinoma (0/2)     IMAGING  Prostate MRI from 1/17/23    IMPRESSION:  1.  Based on the most suspicious abnormality, this exam is  characterized as PIRADS 5 - Clinically significant cancer is highly  likely to be present.  The most suspicious abnormality is stable  compared to 6/13/2021 and is located at the right mid gland peripheral  and transition zone and there is high suspicion of extraprostatic  extension.  2.  Additional lesion in the right apex peripheral and transition zone  characterized as PIRADS 4 - Clinically significant cancer is likely to  be present.  There is short segment capsular abutment with low  likelihood of minimal extraprostatic extension.  3.  Additional lesion in the right mid gland peripheral zone  characterized PIRADS 4 - Clinically significant cancer is likely to be  present. There  minimal capsular abutment with no convincing evidence  of extraprostatic extension.  4.  No suspicious adenopathy.  5.  Focal observation in the left ischium, indeterminate, consider  bone scan for further evaluation.  6.  Increased size of now 2.1 cm right urinary bladder stone.    IMPRESSION  [place] 3+4 prostate cancer, [id number], positive margins with detectable PSA following RALP that does not meet criteria for PSA persistence or recurrence  Erectile dysfunction after radical prostatectomy  Stress incontinence after radical prostatectomy    It was a pleasure to see . Filemon Modi in clinic today in follow-up for his history of [place] 3+4 prostate cancer status-post radical prostatectomy with Dr. Escobedo on 4/7/2023.  After reviewing his clinical history, we discussed that his PSA has once again gone up, though it is still under the threshold for true recurrence.  We discussed that we cannot accurately get a doubling time until his PSA actually gets to an crests point to, so we will not be able to give any accurate information on this until that time.  However, given the continued rise it would be best for us to get a repeat PSA again at this 3-month interval as it is possible he will get very close to or crest that point to value at his next appointment.  We then again reviewed next steps in regards to a referral to radiation oncology for discussion of continued PSA surveillance until a PSMA PET scan can be considered.    I am glad to hear that his post prostatectomy stress incontinence and erectile dysfunction are stable and that he still feels that these do not need to be intervened upon and that his situation is tolerable for the remainder of his life.  I will leave it to him to let me know if he would like any further intervention for these findings.    Mr. Modi expressed understanding and agreement to the above discussion and plan and all of his questions were answered to his satisfaction.      PLAN  Repeat PSA in 3 months with follow-up office visit with me shortly afterwards to discuss results    Signed by:    James Sifuentes PA-C    I spent a total of 28 minutes spent on the date of the encounter doing chart review, history and exam, documentation, and further activities as noted above.

## 2024-06-16 ENCOUNTER — HEALTH MAINTENANCE LETTER (OUTPATIENT)
Age: 79
End: 2024-06-16

## 2024-07-18 ENCOUNTER — PRE VISIT (OUTPATIENT)
Dept: UROLOGY | Facility: CLINIC | Age: 79
End: 2024-07-18
Payer: COMMERCIAL

## 2024-07-18 NOTE — TELEPHONE ENCOUNTER
Reason for visit: Repeat PSA     Relevant information: Prostate cancer, stress incontinence, some hx of ED after radical prostatectomy    Records/imaging/labs/orders: all records available    Pt called: no need for a call    At Rooming:  Standard rooming      William Anton  7/18/2024  4:12 PM

## 2024-07-24 ENCOUNTER — LAB (OUTPATIENT)
Dept: LAB | Facility: CLINIC | Age: 79
End: 2024-07-24
Payer: COMMERCIAL

## 2024-07-24 DIAGNOSIS — R97.21 RISING PSA FOLLOWING TREATMENT FOR MALIGNANT NEOPLASM OF PROSTATE: ICD-10-CM

## 2024-07-24 LAB — PSA SERPL DL<=0.01 NG/ML-MCNC: 0.2 NG/ML (ref 0–6.5)

## 2024-07-24 PROCEDURE — 84153 ASSAY OF PSA TOTAL: CPT | Performed by: PATHOLOGY

## 2024-07-24 PROCEDURE — 36415 COLL VENOUS BLD VENIPUNCTURE: CPT | Performed by: PATHOLOGY

## 2024-07-25 NOTE — PROGRESS NOTES
Subjective     REASON FOR VISIT  Prostate cancer follow up    HISTORY OF PRESENT ILLNESS  Mr. Filemon Modi is a pleasant 79 year old gentleman with history of [place] 3+4 prostate cancer status post radical prostatectomy and cystolitholapaxy on 4/7/2023 with final pathology showing [id number] with focal positive margins.     Since his surgery, he has noted some stress incontinence that has been improving.  Also has struggled with erections following prostatectomy despite using Viagra and a silicone ring -best erection is 5 out of 10 with these interventions.  Unfortunately his PSA was detectable following prostatectomy but it was low at 0.08.  It has fluctuated some since his surgery, so we have been watching this at 3-month intervals.    When I last saw him on 4/23/2024, his post prostatectomy stress incontinence was only requiring 1 light depends pad per day, and was comfortable with this outcome.  Also felt that his post-prostatectomy erectile dysfunction was very stable.    Objective     PHYSICAL EXAM  General: Alert, oriented, no acute distress    LABORATORY  Lab Results   Component Value Date    PSA 0.20 07/24/2024    PSA 0.13 04/16/2024    PSA 0.09 01/16/2024    PSA 0.03 10/11/2023    PSA 0.08 07/12/2023    PSA 7.33 (H) 01/17/2023    PSA 8.53 (H) 05/04/2022    PSA 14.10 (H) 10/26/2021     Final Diagnosis   A. PROSTATE, RADICAL RESECTION:  -Prostatic adenocarcinoma, grade group 2 ([place] score 3+4=7)  -Surgical margin is focally positive (right posterior), other margins are all negative  -Focal extraprostatic spread extension is present (right anterior)  -Perineural invasion is present, no evidence of lymphovascular spread    B. RIGHT PELVIC LYMPH NODES, DISSECTION:  -Five benign lymph nodes, negative for metastatic carcinoma (0/5)    C. LEFT PELVIC LYMPH NODES, DISSECTION:  -Two benign lymph nodes, negative for metastatic carcinoma (0/2)     IMAGING  Prostate MRI from 1/17/23    IMPRESSION:  1.  Based on the  most suspicious abnormality, this exam is  characterized as PIRADS 5 - Clinically significant cancer is highly  likely to be present.  The most suspicious abnormality is stable  compared to 6/13/2021 and is located at the right mid gland peripheral  and transition zone and there is high suspicion of extraprostatic  extension.  2.  Additional lesion in the right apex peripheral and transition zone  characterized as PIRADS 4 - Clinically significant cancer is likely to  be present.  There is short segment capsular abutment with low  likelihood of minimal extraprostatic extension.  3.  Additional lesion in the right mid gland peripheral zone  characterized PIRADS 4 - Clinically significant cancer is likely to be  present. There minimal capsular abutment with no convincing evidence  of extraprostatic extension.  4.  No suspicious adenopathy.  5.  Focal observation in the left ischium, indeterminate, consider  bone scan for further evaluation.  6.  Increased size of now 2.1 cm right urinary bladder stone.    IMPRESSION  [place] 3+4 prostate cancer, [id number], positive margins with detectable PSA following RALP that does not meet criteria for PSA persistence or recurrence  Erectile dysfunction after radical prostatectomy  Stress incontinence after radical prostatectomy    It was a pleasure to see Mr. Filemon Modi in clinic today in follow-up for his history of [place] 3+4 prostate cancer status-post radical prostatectomy with Dr. Escobedo on 4/7/2023. After reviewing his clinical history, we discussed that his PSA has unfortunately now hit the threshold of biochemical recurrence for prostate cancer.  We again reviewed the 3 potential causes of detectable PSA after prostatectomy, and that this most likely represents true biochemical recurrence.  With this in mind, we discussed that my initial recommendation would be to send him to radiation oncology for discussion of salvage radiation therapy.    Filemon then spent some time  discussing his strong desire to avoid any radiation therapy given his concern for very detrimental side effects.  He also notes that he is watching his brother go through radiation therapy for lung cancer and the complications his brother has been having.  He would like to discuss other alternative management options, and we discussed trending his PSA with repeat values every 3 months to truly determine a doubling time as well as considering a PSMA PET scan to determine if this appears to be just local recurrence versus medicine disease.  We did discuss the risks of this management option including the possibility of metastatic disease and the possibility of losing truly curative treatment options versus just management options in the future.  He accepts these risks, so we will plan for repeat PSA in 3 months with a follow-up visit with me shortly afterwards.    Mr. Modi expressed understanding and agreement to the above discussion and plan and all of his questions were answered to his satisfaction.     PLAN  Repeat PSA in 3 months with follow-up office visit with me shortly afterwards    Signed by:    James Sifuentes PA-C    I spent a total of 30 minutes spent on the date of the encounter doing chart review, history and exam, documentation, and further activities as noted above.

## 2024-07-30 ENCOUNTER — OFFICE VISIT (OUTPATIENT)
Dept: UROLOGY | Facility: CLINIC | Age: 79
End: 2024-07-30
Payer: COMMERCIAL

## 2024-07-30 VITALS
HEIGHT: 72 IN | DIASTOLIC BLOOD PRESSURE: 82 MMHG | BODY MASS INDEX: 25.06 KG/M2 | OXYGEN SATURATION: 97 % | HEART RATE: 61 BPM | WEIGHT: 185 LBS | SYSTOLIC BLOOD PRESSURE: 168 MMHG

## 2024-07-30 DIAGNOSIS — R97.21 RISING PSA FOLLOWING TREATMENT FOR MALIGNANT NEOPLASM OF PROSTATE: Primary | ICD-10-CM

## 2024-07-30 PROCEDURE — 99214 OFFICE O/P EST MOD 30 MIN: CPT | Performed by: STUDENT IN AN ORGANIZED HEALTH CARE EDUCATION/TRAINING PROGRAM

## 2024-07-30 ASSESSMENT — PAIN SCALES - GENERAL: PAINLEVEL: NO PAIN (0)

## 2024-07-30 NOTE — NURSING NOTE
"Chief Complaint   Patient presents with    Consult     PSA follow up       Blood pressure (!) 168/82, pulse 61, height 1.835 m (6' 0.25\"), weight 83.9 kg (185 lb), SpO2 97%. Body mass index is 24.92 kg/m .    Patient Active Problem List   Diagnosis    Prostate cancer (H)    Stress incontinence after surgical procedure    Erectile dysfunction after radical prostatectomy    Rising PSA following treatment for malignant neoplasm of prostate       No Known Allergies    Current Outpatient Medications   Medication Sig Dispense Refill    acetaminophen (TYLENOL) 325 MG tablet Take 1-2 tablets (325-650 mg) by mouth every 6 hours as needed for mild pain 100 tablet 0    atorvastatin (LIPITOR) 20 MG tablet Take 20 mg by mouth At Bedtime      Brimonidine Tartrate 0.025 % SOLN Apply 1 drop to eye as needed      Cholecalciferol (VITAMIN D PO) Take 1,000 mg by mouth every morning       diphenhydrAMINE (BENADRYL) 25 MG capsule Take 25 mg by mouth every 6 hours as needed for itching or allergies      Levothyroxine Sodium (LEVOTHROID PO) Take 125 mcg by mouth every morning          Social History     Tobacco Use    Smoking status: Never    Smokeless tobacco: Never   Substance Use Topics    Alcohol use: Yes     Alcohol/week: 7.0 standard drinks of alcohol     Types: 7 Glasses of wine per week     Comment: wine daily    Drug use: Yes     Types: Marijuana     Comment: Daily       William Anton  7/30/2024  7:39 AM     "

## 2024-07-30 NOTE — LETTER
7/30/2024       RE: iFlemon Modi  [address] [facility] [address]     Dear Colleague,    Thank you for referring your patient, Filemon Modi, to the [facility] [place] at [facility]. Please see a copy of my visit note below.    Subjective    REASON FOR VISIT  Prostate cancer follow up    HISTORY OF PRESENT ILLNESS  Mr. Filemon Modi is a pleasant 79 year old gentleman with history of [place] 3+4 prostate cancer status post radical prostatectomy and cystolitholapaxy on 4/7/2023 with final pathology showing [id number] with focal positive margins.     Since his surgery, he has noted some stress incontinence that has been improving.  Also has struggled with erections following prostatectomy despite using Viagra and a silicone ring -best erection is 5 out of 10 with these interventions.  Unfortunately his PSA was detectable following prostatectomy but it was low at 0.08.  It has fluctuated some since his surgery, so we have been watching this at 3-month intervals.    When I last saw him on 4/23/2024, his post prostatectomy stress incontinence was only requiring 1 light depends pad per day, and was comfortable with this outcome.  Also felt that his post-prostatectomy erectile dysfunction was very stable.    Objective    PHYSICAL EXAM  General: Alert, oriented, no acute distress    LABORATORY  Lab Results   Component Value Date    PSA 0.20 07/24/2024    PSA 0.13 04/16/2024    PSA 0.09 01/16/2024    PSA 0.03 10/11/2023    PSA 0.08 07/12/2023    PSA 7.33 (H) 01/17/2023    PSA 8.53 (H) 05/04/2022    PSA 14.10 (H) 10/26/2021     Final Diagnosis   A. PROSTATE, RADICAL RESECTION:  -Prostatic adenocarcinoma, grade group 2 ([place] score 3+4=7)  -Surgical margin is focally positive (right posterior), other margins are all negative  -Focal extraprostatic spread extension is present (right anterior)  -Perineural invasion is present, no evidence of  lymphovascular spread    B. RIGHT PELVIC LYMPH NODES, DISSECTION:  -Five benign lymph nodes, negative for metastatic carcinoma (0/5)    C. LEFT PELVIC LYMPH NODES, DISSECTION:  -Two benign lymph nodes, negative for metastatic carcinoma (0/2)     IMAGING  Prostate MRI from 1/17/23    IMPRESSION:  1.  Based on the most suspicious abnormality, this exam is  characterized as PIRADS 5 - Clinically significant cancer is highly  likely to be present.  The most suspicious abnormality is stable  compared to 6/13/2021 and is located at the right mid gland peripheral  and transition zone and there is high suspicion of extraprostatic  extension.  2.  Additional lesion in the right apex peripheral and transition zone  characterized as PIRADS 4 - Clinically significant cancer is likely to  be present.  There is short segment capsular abutment with low  likelihood of minimal extraprostatic extension.  3.  Additional lesion in the right mid gland peripheral zone  characterized PIRADS 4 - Clinically significant cancer is likely to be  present. There minimal capsular abutment with no convincing evidence  of extraprostatic extension.  4.  No suspicious adenopathy.  5.  Focal observation in the left ischium, indeterminate, consider  bone scan for further evaluation.  6.  Increased size of now 2.1 cm right urinary bladder stone.    IMPRESSION  [place] 3+4 prostate cancer, [id number], positive margins with detectable PSA following RALP that does not meet criteria for PSA persistence or recurrence  Erectile dysfunction after radical prostatectomy  Stress incontinence after radical prostatectomy    It was a pleasure to see Mr. Filemon Modi in clinic today in follow-up for his history of [place] 3+4 prostate cancer status-post radical prostatectomy with Dr. Escobedo on 4/7/2023. After reviewing his clinical history, we discussed that his PSA has unfortunately now hit the threshold of biochemical recurrence for prostate cancer.  We again  reviewed the 3 potential causes of detectable PSA after prostatectomy, and that this most likely represents true biochemical recurrence.  With this in mind, we discussed that my initial recommendation would be to send him to radiation oncology for discussion of salvage radiation therapy.    Filemon then spent some time discussing his strong desire to avoid any radiation therapy given his concern for very detrimental side effects.  He also notes that he is watching his brother go through radiation therapy for lung cancer and the complications his brother has been having.  He would like to discuss other alternative management options, and we discussed trending his PSA with repeat values every 3 months to truly determine a doubling time as well as considering a PSMA PET scan to determine if this appears to be just local recurrence versus medicine disease.  We did discuss the risks of this management option including the possibility of metastatic disease and the possibility of losing truly curative treatment options versus just management options in the future.  He accepts these risks, so we will plan for repeat PSA in 3 months with a follow-up visit with me shortly afterwards.    Mr. Modi expressed understanding and agreement to the above discussion and plan and all of his questions were answered to his satisfaction.     PLAN  Repeat PSA in 3 months with follow-up office visit with me shortly afterwards    Signed by:    James Sifuentes PA-C    I spent a total of 30 minutes spent on the date of the encounter doing chart review, history and exam, documentation, and further activities as noted above.       Again, thank you for allowing me to participate in the care of your patient.      Sincerely,    James Sifuentes PA-C

## 2024-10-25 ENCOUNTER — PRE VISIT (OUTPATIENT)
Dept: UROLOGY | Facility: CLINIC | Age: 79
End: 2024-10-25
Payer: COMMERCIAL

## 2024-10-25 NOTE — TELEPHONE ENCOUNTER
Reason for visit: 3 month follow up     Relevant information: rising psa following treatment of malignant neoplasm of prostate, stress incontinence, ED after radical prostatectomy    Records/imaging/labs/orders: all records available    At Rooming:  Standard rooming      William Anton  10/25/2024  10:39 AM

## 2024-10-28 ENCOUNTER — LAB (OUTPATIENT)
Dept: LAB | Facility: CLINIC | Age: 79
End: 2024-10-28
Payer: COMMERCIAL

## 2024-10-28 DIAGNOSIS — R97.21 RISING PSA FOLLOWING TREATMENT FOR MALIGNANT NEOPLASM OF PROSTATE: ICD-10-CM

## 2024-10-28 LAB — PSA SERPL DL<=0.01 NG/ML-MCNC: 0.27 NG/ML (ref 0–6.5)

## 2024-10-28 PROCEDURE — 36415 COLL VENOUS BLD VENIPUNCTURE: CPT | Performed by: PATHOLOGY

## 2024-10-28 PROCEDURE — 84153 ASSAY OF PSA TOTAL: CPT | Performed by: PATHOLOGY

## 2024-11-04 NOTE — PROGRESS NOTES
Subjective     REASON FOR VISIT  Prostate cancer follow up    HISTORY OF PRESENT ILLNESS  Mr. Filemon Modi is a pleasant 79 year old gentleman with history of [place] 3+4 prostate cancer status post radical prostatectomy and cystolitholapaxy on 4/7/2023 with final pathology showing [id number] with focal positive margins.     Since his surgery, he has noted some stress incontinence that has been improving.  Also has struggled with erections following prostatectomy despite using Viagra and a silicone ring -best erection is 5 out of 10 with these interventions.  Unfortunately his PSA was detectable following prostatectomy but it was low at 0.08.  It has fluctuated some since his surgery, so we have been watching this at 3-month intervals.    Overall he has found that his post-prostatectomy stress incontinence and erectile dysfunction has landed in an area that is reasonable for him, and is not concerned about these issues at this time.    When I last saw him on 7/30/2024, we discussed that his PSA had unfortunately crusted up to the level of biochemical recurrence, meaning that we would recommend he meet with radiation oncology for discussion of radiation therapy.  However, Filemon expressed his strong desire to avoid any form of radiation therapy, and that his preference would be to repeat the PSA, determine a better doubling time, and consider a PSMA PET scan in the future to determine if this is local disease versus metastatic disease.    Objective     PHYSICAL EXAM  General: Alert, oriented, no acute distress    LABORATORY  Lab Results   Component Value Date    PSA 0.27 10/28/2024    PSA 0.20 07/24/2024    PSA 0.13 04/16/2024    PSA 0.09 01/16/2024    PSA 0.03 10/11/2023    PSA 0.08 07/12/2023    PSA 7.33 (H) 01/17/2023    PSA 8.53 (H) 05/04/2022     Final Diagnosis   A. PROSTATE, RADICAL RESECTION:  -Prostatic adenocarcinoma, grade group 2 ([place] score 3+4=7)  -Surgical margin is focally positive (right posterior),  other margins are all negative  -Focal extraprostatic spread extension is present (right anterior)  -Perineural invasion is present, no evidence of lymphovascular spread    B. RIGHT PELVIC LYMPH NODES, DISSECTION:  -Five benign lymph nodes, negative for metastatic carcinoma (0/5)    C. LEFT PELVIC LYMPH NODES, DISSECTION:  -Two benign lymph nodes, negative for metastatic carcinoma (0/2)     IMAGING  Prostate MRI from 1/17/23    IMPRESSION:  1.  Based on the most suspicious abnormality, this exam is  characterized as PIRADS 5 - Clinically significant cancer is highly  likely to be present.  The most suspicious abnormality is stable  compared to 6/13/2021 and is located at the right mid gland peripheral  and transition zone and there is high suspicion of extraprostatic  extension.  2.  Additional lesion in the right apex peripheral and transition zone  characterized as PIRADS 4 - Clinically significant cancer is likely to  be present.  There is short segment capsular abutment with low  likelihood of minimal extraprostatic extension.  3.  Additional lesion in the right mid gland peripheral zone  characterized PIRADS 4 - Clinically significant cancer is likely to be  present. There minimal capsular abutment with no convincing evidence  of extraprostatic extension.  4.  No suspicious adenopathy.  5.  Focal observation in the left ischium, indeterminate, consider  bone scan for further evaluation.  6.  Increased size of now 2.1 cm right urinary bladder stone.    IMPRESSION  [place] 3+4 prostate cancer, [id number], positive margins with detectable PSA following RALP that does now meet criteria for PSA persistence or recurrence  Erectile dysfunction after radical prostatectomy  Stress incontinence after radical prostatectomy    It was a pleasure to see . Filemon Modi in clinic today in follow-up for his history of [place] 3+4 prostate cancer status-post radical prostatectomy with Dr. Escobedo on 4/7/2023. After reviewing his  clinical history, we discussed that his PSA has unfortunately now hit the threshold of biochemical recurrence for prostate cancer.  After discussing that his PSA once again went up and is now sitting at 0.27, I informed Filemon that I discussed his case with Dr. Escobedo who informed me of a possible trial going on in the medical oncology department for patients with biochemical recurrence and PSA doubling times of less than 9 months.  Filemon and I spent some time reviewing and [place] him prostate nomogram for chance of death in the setting of radical prostatectomy and biochemical recurrence, as well as looking after percentages of chance of metastasis after prostatectomy.  However ultimately the plan was to repeat a PSA 3 months later with a follow-up visit with Dr. Escobedo for further discussion about the possibility of referring him to medical oncology.    Mr. Modi expressed understanding and agreement to the above discussion and plan and all of his questions were answered to his satisfaction.     PLAN  Repeat PSA in 3 months with follow-up visit with Dr. Escobedo    Signed by:    James Sifuentes PA-C    I spent a total of 26 minutes spent on the date of the encounter doing chart review, history and exam, documentation, and further activities as noted above.

## 2024-11-05 ENCOUNTER — OFFICE VISIT (OUTPATIENT)
Dept: UROLOGY | Facility: CLINIC | Age: 79
End: 2024-11-05
Payer: COMMERCIAL

## 2024-11-05 VITALS
SYSTOLIC BLOOD PRESSURE: 154 MMHG | HEART RATE: 59 BPM | WEIGHT: 187 LBS | BODY MASS INDEX: 23.25 KG/M2 | HEIGHT: 75 IN | OXYGEN SATURATION: 98 % | DIASTOLIC BLOOD PRESSURE: 92 MMHG

## 2024-11-05 DIAGNOSIS — R97.21 RISING PSA FOLLOWING TREATMENT FOR MALIGNANT NEOPLASM OF PROSTATE: Primary | ICD-10-CM

## 2024-11-05 PROCEDURE — 99213 OFFICE O/P EST LOW 20 MIN: CPT | Performed by: STUDENT IN AN ORGANIZED HEALTH CARE EDUCATION/TRAINING PROGRAM

## 2024-11-05 ASSESSMENT — PAIN SCALES - GENERAL: PAINLEVEL_OUTOF10: NO PAIN (0)

## 2024-11-05 NOTE — LETTER
11/5/2024       RE: Filemon Modi  [address] [facility] [address]     Dear Colleague,    Thank you for referring your patient, Filemon Modi, to the [facility] [place] at [facility]. Please see a copy of my visit note below.    Subjective    REASON FOR VISIT  Prostate cancer follow up    HISTORY OF PRESENT ILLNESS  Mr. Filemon Modi is a pleasant 79 year old gentleman with history of [place] 3+4 prostate cancer status post radical prostatectomy and cystolitholapaxy on 4/7/2023 with final pathology showing [id number] with focal positive margins.     Since his surgery, he has noted some stress incontinence that has been improving.  Also has struggled with erections following prostatectomy despite using Viagra and a silicone ring -best erection is 5 out of 10 with these interventions.  Unfortunately his PSA was detectable following prostatectomy but it was low at 0.08.  It has fluctuated some since his surgery, so we have been watching this at 3-month intervals.    Overall he has found that his post-prostatectomy stress incontinence and erectile dysfunction has landed in an area that is reasonable for him, and is not concerned about these issues at this time.    When I last saw him on 7/30/2024, we discussed that his PSA had unfortunately crusted up to the level of biochemical recurrence, meaning that we would recommend he meet with radiation oncology for discussion of radiation therapy.  However, Filemon expressed his strong desire to avoid any form of radiation therapy, and that his preference would be to repeat the PSA, determine a better doubling time, and consider a PSMA PET scan in the future to determine if this is local disease versus metastatic disease.    Objective    PHYSICAL EXAM  General: Alert, oriented, no acute distress    LABORATORY  Lab Results   Component Value Date    PSA 0.27 10/28/2024    PSA 0.20 07/24/2024    PSA  0.13 04/16/2024    PSA 0.09 01/16/2024    PSA 0.03 10/11/2023    PSA 0.08 07/12/2023    PSA 7.33 (H) 01/17/2023    PSA 8.53 (H) 05/04/2022     Final Diagnosis   A. PROSTATE, RADICAL RESECTION:  -Prostatic adenocarcinoma, grade group 2 ([place] score 3+4=7)  -Surgical margin is focally positive (right posterior), other margins are all negative  -Focal extraprostatic spread extension is present (right anterior)  -Perineural invasion is present, no evidence of lymphovascular spread    B. RIGHT PELVIC LYMPH NODES, DISSECTION:  -Five benign lymph nodes, negative for metastatic carcinoma (0/5)    C. LEFT PELVIC LYMPH NODES, DISSECTION:  -Two benign lymph nodes, negative for metastatic carcinoma (0/2)     IMAGING  Prostate MRI from 1/17/23    IMPRESSION:  1.  Based on the most suspicious abnormality, this exam is  characterized as PIRADS 5 - Clinically significant cancer is highly  likely to be present.  The most suspicious abnormality is stable  compared to 6/13/2021 and is located at the right mid gland peripheral  and transition zone and there is high suspicion of extraprostatic  extension.  2.  Additional lesion in the right apex peripheral and transition zone  characterized as PIRADS 4 - Clinically significant cancer is likely to  be present.  There is short segment capsular abutment with low  likelihood of minimal extraprostatic extension.  3.  Additional lesion in the right mid gland peripheral zone  characterized PIRADS 4 - Clinically significant cancer is likely to be  present. There minimal capsular abutment with no convincing evidence  of extraprostatic extension.  4.  No suspicious adenopathy.  5.  Focal observation in the left ischium, indeterminate, consider  bone scan for further evaluation.  6.  Increased size of now 2.1 cm right urinary bladder stone.    IMPRESSION  [place] 3+4 prostate cancer, [id number], positive margins with detectable PSA following RALP that does now meet criteria for PSA persistence or  recurrence  Erectile dysfunction after radical prostatectomy  Stress incontinence after radical prostatectomy    It was a pleasure to see Mr. Filemon Modi in clinic today in follow-up for his history of [place] 3+4 prostate cancer status-post radical prostatectomy with Dr. Escobedo on 4/7/2023. After reviewing his clinical history, we discussed that his PSA has unfortunately now hit the threshold of biochemical recurrence for prostate cancer.  After discussing that his PSA once again went up and is now sitting at 0.27, I informed Filemon that I discussed his case with Dr. Escobedo who informed me of a possible trial going on in the medical oncology department for patients with biochemical recurrence and PSA doubling times of less than 9 months.  Filemon and I spent some time reviewing and [place] him prostate nomogram for chance of death in the setting of radical prostatectomy and biochemical recurrence, as well as looking after percentages of chance of metastasis after prostatectomy.  However ultimately the plan was to repeat a PSA 3 months later with a follow-up visit with Dr. Escobedo for further discussion about the possibility of referring him to medical oncology.    Mr. Modi expressed understanding and agreement to the above discussion and plan and all of his questions were answered to his satisfaction.     PLAN  Repeat PSA in 3 months with follow-up visit with Dr. Escobedo    Signed by:    James Sifuentes PA-C    I spent a total of 26 minutes spent on the date of the encounter doing chart review, history and exam, documentation, and further activities as noted above.       Again, thank you for allowing me to participate in the care of your patient.      Sincerely,    James Sifuentes PA-C

## 2024-11-05 NOTE — NURSING NOTE
"Chief Complaint   Patient presents with    Consult     Pt states is here for prostate cancer surgery        Blood pressure (!) 154/92, pulse 59, height 1.892 m (6' 2.5\"), weight 84.8 kg (187 lb), SpO2 98%. Body mass index is 23.69 kg/m .    Patient Active Problem List   Diagnosis    Prostate cancer (H)    Stress incontinence after surgical procedure    Erectile dysfunction after radical prostatectomy    Rising PSA following treatment for malignant neoplasm of prostate       No Known Allergies    Current Outpatient Medications   Medication Sig Dispense Refill    acetaminophen (TYLENOL) 325 MG tablet Take 1-2 tablets (325-650 mg) by mouth every 6 hours as needed for mild pain 100 tablet 0    atorvastatin (LIPITOR) 20 MG tablet Take 20 mg by mouth At Bedtime      Brimonidine Tartrate 0.025 % SOLN Apply 1 drop to eye as needed      Cholecalciferol (VITAMIN D PO) Take 1,000 mg by mouth every morning       diphenhydrAMINE (BENADRYL) 25 MG capsule Take 25 mg by mouth every 6 hours as needed for itching or allergies      Levothyroxine Sodium (LEVOTHROID PO) Take 125 mcg by mouth every morning          Social History     Tobacco Use    Smoking status: Never    Smokeless tobacco: Never   Substance Use Topics    Alcohol use: Yes     Alcohol/week: 7.0 standard drinks of alcohol     Types: 7 Glasses of wine per week     Comment: wine daily    Drug use: Yes     Types: Marijuana     Comment: Daily       William Anton  11/5/2024  9:35 AM     "

## 2025-01-22 NOTE — TELEPHONE ENCOUNTER
Reason for visit: Follow up: elevated PSA following treatment for malignant neoplasm of the prostate.     Relevant information: Last seen by Zia Sifuentes PA-C on 11/05/24.    Plan of care to repeat the PSA, determine a better doubling time, and consider a PSMA  PET scan in the future to determine if this is local disease versus metastatic disease.     Records/imaging/labs/orders: IN [facility], CARE EVERYWHERE, AND PACS   PSA ordered - not yet resulted. Scheduled for 2/5/25.     At Rooming: Standard rooming    Jessenia Cameron  1/22/2025  11:27 AM

## 2025-02-05 ENCOUNTER — LAB (OUTPATIENT)
Dept: LAB | Facility: CLINIC | Age: 80
End: 2025-02-05
Payer: COMMERCIAL

## 2025-02-05 DIAGNOSIS — R97.21 RISING PSA FOLLOWING TREATMENT FOR MALIGNANT NEOPLASM OF PROSTATE: ICD-10-CM

## 2025-02-05 LAB — PSA SERPL DL<=0.01 NG/ML-MCNC: 0.38 NG/ML (ref 0–6.5)

## 2025-02-05 PROCEDURE — 84153 ASSAY OF PSA TOTAL: CPT | Performed by: PATHOLOGY

## 2025-02-05 PROCEDURE — 36415 COLL VENOUS BLD VENIPUNCTURE: CPT | Performed by: PATHOLOGY

## 2025-02-10 ENCOUNTER — TRANSCRIBE ORDERS (OUTPATIENT)
Dept: ONCOLOGY | Facility: CLINIC | Age: 80
End: 2025-02-10

## 2025-02-10 ENCOUNTER — PATIENT OUTREACH (OUTPATIENT)
Dept: ONCOLOGY | Facility: CLINIC | Age: 80
End: 2025-02-10

## 2025-02-10 ENCOUNTER — OFFICE VISIT (OUTPATIENT)
Dept: UROLOGY | Facility: CLINIC | Age: 80
End: 2025-02-10
Payer: COMMERCIAL

## 2025-02-10 ENCOUNTER — PRE VISIT (OUTPATIENT)
Dept: UROLOGY | Facility: CLINIC | Age: 80
End: 2025-02-10

## 2025-02-10 VITALS
BODY MASS INDEX: 23.25 KG/M2 | WEIGHT: 187 LBS | SYSTOLIC BLOOD PRESSURE: 167 MMHG | DIASTOLIC BLOOD PRESSURE: 77 MMHG | HEIGHT: 75 IN | HEART RATE: 61 BPM | OXYGEN SATURATION: 97 %

## 2025-02-10 DIAGNOSIS — C61 PROSTATE CANCER (H): Primary | ICD-10-CM

## 2025-02-10 ASSESSMENT — PAIN SCALES - GENERAL: PAINLEVEL_OUTOF10: NO PAIN (0)

## 2025-02-10 NOTE — NURSING NOTE
"Chief Complaint   Patient presents with    Consult     Pt states here for elevated PSA post prostatectomy       Blood pressure (!) 167/77, pulse 61, height 1.892 m (6' 2.5\"), weight 84.8 kg (187 lb), SpO2 97%. Body mass index is 23.69 kg/m .    Patient Active Problem List   Diagnosis    Prostate cancer (H)    Stress incontinence after surgical procedure    Erectile dysfunction after radical prostatectomy    Rising PSA following treatment for malignant neoplasm of prostate       No Known Allergies    Current Outpatient Medications   Medication Sig Dispense Refill    acetaminophen (TYLENOL) 325 MG tablet Take 1-2 tablets (325-650 mg) by mouth every 6 hours as needed for mild pain 100 tablet 0    atorvastatin (LIPITOR) 20 MG tablet Take 20 mg by mouth At Bedtime      Brimonidine Tartrate 0.025 % SOLN Apply 1 drop to eye as needed      Cholecalciferol (VITAMIN D PO) Take 1,000 mg by mouth every morning       diphenhydrAMINE (BENADRYL) 25 MG capsule Take 25 mg by mouth every 6 hours as needed for itching or allergies      Levothyroxine Sodium (LEVOTHROID PO) Take 125 mcg by mouth every morning          Social History     Tobacco Use    Smoking status: Never    Smokeless tobacco: Never   Substance Use Topics    Alcohol use: Yes     Alcohol/week: 7.0 standard drinks of alcohol     Types: 7 Glasses of wine per week     Comment: wine daily    Drug use: Yes     Types: Marijuana     Comment: Daily       William Anton  2/10/2025  10:55 AM     "

## 2025-02-10 NOTE — PROGRESS NOTES
New Patient Oncology Nurse Navigator Note     Referring provider:   Referred By    Provider Department Location Phone   Royal Escobedo MD  Oncology Adult [facility] [phone]      Referred to (specialty): Medical Oncology    Requested provider (if applicable):   Dr. Huff     Date Referral Received:   02/10/25     Evaluation for :   Prostate cancer recurrence - may be a candidate for clinical trials for PSA recurrence per Dr. Escobedo note     Clinical History (per Nurse review of records provided):    Patient of Dr. Escobedo, with history of prostate cancer.  He is status post radical prostatectomy and cystolitholapaxy on 4/7/2023 with final pathology showing [id number] with focal positive margins. [place] 3+4 prostate cancer  PSA   Date Value Ref Range Status   04/21/2021 14.50 (H) 0 - 4 ug/L Final     Comment:     Assay Method:  Chemiluminescence using Siemens Vista analyzer     PSA Tumor Marker   Date Value Ref Range Status   02/05/2025 0.38 0.00 - 6.50 ng/mL Final   05/04/2022 8.53 (H) 0.00 - 4.00 ug/L Final     His PSA continues to rise with doubling time of about 7 months.  Patient is not interested in radiation so he is referred to medical oncology for further discussion.    Pertinent urology notes, pathology/labs & imaging bookmarked**     Records Location (Care Everywhere, Media, etc.):   [facility]      I called Filemon to discuss referral to oncology.  I introduced my role and reviewed what this consult visit will entail/what to expect.  I reviewed the location and gave contact numbers including new patient scheduling and clinic phone numbers.   Filemon has no other questions at this time.    I forwarded on referral with scheduling instructions for the following     PLAN: SCHEDULE: HOLD: Dr. Huff, [facility],  2/26 1077 - 111, NEW, in person     I warm transferred call to our new patient scheduling to finalize appointment.    Fifi Reid RN,  BSN  Oncology New Patient Nurse Navigator   [facility]  [phone]

## 2025-02-10 NOTE — LETTER
2/10/2025       RE: Filemon Modi  [address] [facility] [address]     Dear Colleague,    Thank you for referring your patient, Filemon Modi, to the [facility]OLOGY [facility] [place] at [facility]. Please see a copy of my visit note below.    REASON FOR VISIT  Prostate cancer follow up     HISTORY OF PRESENT ILLNESS  Mr. Filemon Modi is a pleasant 79 year old gentleman with history of [place] 3+4 prostate cancer status post radical prostatectomy and cystolitholapaxy on 4/7/2023 with final pathology showing [id number] with focal positive margins.      Since his surgery, he has noted some stress incontinence that has been improving.  Also has struggled with erections following prostatectomy despite using Viagra and a silicone ring -best erection is 5 out of 10 with these interventions.  Unfortunately his PSA was detectable following prostatectomy but it was low at 0.08.  It has fluctuated some since his surgery, so we have been watching this at 3-month intervals.     Overall he has found that his post-prostatectomy stress incontinence and erectile dysfunction has landed in an area that is reasonable for him, and is not concerned about these issues at this time.     When last seen on 7/30/2024, his PSA had unfortunately risen to the level of biochemical recurrence, and radiation was discussed, but the patient declined and wishes to repeat his PSA.    OBJECTIVE:                                PSA                       0.38                2/5/2025     PSA 0.27 10/28/2024     PSA 0.20 07/24/2024     PSA 0.13 04/16/2024     PSA 0.09 01/16/2024     PSA 0.03 10/11/2023     PSA 0.08 07/12/2023     PSA 7.33 (H) 01/17/2023     PSA 8.53 (H) 05/04/2022     ASSESSMENT AND PLAN: 38-minutes were spent reviewing the chart, interpreting results and counseling the patient regarding his prostate cancer.  PSA continues to rise with DT about 7 months.  We discussed  options including observation vs salvage XRT vs systemic therapy.  The patient does not wish to undergo radiation at this time.  We will send the patient to medical oncology to hear further about possible systemic options given the shorter DT.  We discussed that he may be a candidate for one of our clinical trials for PSA recurrence.  We discussed that he would need a PSMA PET scan as a next step in his work-up, but the patient would like to meet with radiation oncology first.  We will make this referral.  Otherwise he will see our PA Zia in 3 months with a PSA.      Again, thank you for allowing me to participate in the care of your patient.      Sincerely,    Royal Escobedo MD

## 2025-02-11 NOTE — TELEPHONE ENCOUNTER
RECORDS STATUS - ALL OTHER DIAGNOSIS      RECORDS RECEIVED FROM: [facility]   NOTES STATUS DETAILS   OFFICE NOTE from referring provider Epic 2/10/25: Dr. Royal Escobedo   OPERATIVE REPORT Epic 4/7/23: Prostate radical resection   1/30/23: Prostate Bx  7/5/21: Prostate Bx   MEDICATION LIST [facility]    LABS     PATHOLOGY REPORTS Report in Epic 4/7/23: [id number]   1/30/23: [id number]   7/5/21: [id number]    ANYTHING RELATED TO DIAGNOSIS Epic Most recent 2/5/25   IMAGING (NEED IMAGES & REPORT)     MRI PACS 1/17/23, 6/15/21: MR Prostate

## 2025-02-25 NOTE — PROGRESS NOTES
NEW PATIENT SECOND OPINION CONSULTATION    Reason for Visit:  Biochemically-recurrent prostate cancer s/p prostatectomy, with PSA 0.38 ng/mL and PSADT 4.7 months    History of Present Illness:  Dear Dr. Royal Escobedo,    Thank you for referring your patient for a Second Opinion Consultation at the [facility].  A brief overview of his oncological history as well as my recommendations follow below.    Mr. Modi he is a 79-year-old man who was diagnosed with prostate cancer about two years ago.  His PSA level on 1/17/2023 was found to be 7.3 ng/mL.  He underwent a prostate biopsy on 1/30/2023, which revealed prostate adenocarcinoma in 8 of 12 cores, with a maximum [place] grade of 3+4=7.  He underwent a radical prostatectomy on 4/7/2023.  This revealed [place] 3+4=7 prostatic adenocarcinoma with cribriform features, positive perineural invasion, positive extraprostatic extension, negative lymph nodes, and a positive surgical margin.  His final pathological stage was pT3a N0 R1.    Following prostatectomy, his PSA level was 0.03 ng/mL on 10/11/2023.  Over the next eighteen months, his PSA level has been rising.  On 1/16/2024 the PSA was 0.09, on 4/16/2024 the PSA was 0.13, on 7/24/2024 the PSA was 0.20, on 10/28/2024 the PSA was 0.27, and on 2/5/2025 the PSA was 0.38 ng/mL.  His PSA doubling time is calculated at 4.7 months.    The patient presents for a new consultation at the medical oncology clinic.  He would like to review his management options for high-risk biochemically-recurrent prostate cancer.  He is potentially interested in clinical trial participation, if available.    Review of Systems:  The patient reports feeling generally well.  He does have urinary stress incontinence following his radical prostatectomy, which has stabilized over the past several months.  He also has erectile dysfunction, with partial improvement using Viagra.  He does not report any  additional urinary symptoms or rectal symptoms at this time.  He has not lost or gained any weight recently.  He does not have any cancer-related pain.  A comprehensive 14-system review of symptoms is otherwise generally within normal limits.  His ECOG score is 0.  His pain score is 0/10.    Past Medical History:  Prostate cancer with biochemical recurrence  Hyperlipidemia  Hypertension  Hypothyroidism  Kidney stones  Cataracts  Hernia repairs  Appendectomy    Medications:  Aspirin 81 mg  Atorvastatin 20 mg  Synthroid 125 mcg  Benadryl 25 mg PRN  Tylenol 325 mg PRN  Viagra 50 mg PRN   Omega-3 fish oil  Vitamin D    Allergies:  No known drug allergies.    Social History:  The patient lives in [place], [place].  He does not have a tobacco history.  He reports occasional marijuana use.  He drinks 7 alcoholic beverages per week (typically wine).    Family History:  There is no family history of prostate cancer or breast/ovarian cancers.  His brother had lymphoma.  Another brother had small cell lung cancer.    Physical Examination:  Mr. Modi is a 79-year-old man who appears comfortable at rest.  His vital signs are generally unremarkable.  His HEENT examination is within normal limits.  There is no palpable supraclavicular, cervical, axillary or inguinal lymphadenopathy.  The cardiovascular, respiratory, and gastrointestinal examinations are generally within normal limits.  A digital rectal examination was not performed today.  The neuromuscular examination is grossly intact.  The extremities do not demonstrate pitting edema.  The skin evaluation is unremarkable.    ASSESSMENT AND PLAN:  Mr. Modi is a 79-year-old man with biochemically-recurrent prostate cancer s/p radical prostatectomy (4/7/2023) with a PSA level of 0.38 ng/mL and a PSADT of 4.7 months.  His ECOG score is 0.  His pain score is 0/10.    This patient has a high-risk biochemical recurrence due to the rapid PSA doubling time (4.7 months) and the fact that  his PSA level never became undetectable following prostatectomy.  Thus, we spent the first part of our consultation reviewing the natural history of high-risk biochemical recurrence.  In this context, for patients with a PSADT between 3-9 months, the expected median metastasis-free survival is about 4 years, and the expected median overall survival is about 10 years.  Therefore, there is a need to further improve outcomes in this setting with novel treatment strategies.    Before reviewing our clinical trial portfolio, I again reiterated that the standard salvage treatment option would be to undergo prostatic bed radiotherapy concurrently with at least 6 months of androgen deprivation.  The patient has informed me that he does not wish to undergo salvage radiation therapy, despite this being the only proven option with a curative potential.  Therefore, we spent the remainder of our consultation reviewing systemic treatment options and clinical trial participation.  Outside of a clinical trial, the patient could be treated with intermittent hormonal therapy consisting of ADT with or without enzalutamide.  Furthermore, some providers would consider the use of enzalutamide alone, although I generally discourage this approach due to the increased risk of gynecomastia and cardiovascular toxicity from the secondary estrogen surge.    We then spent some time discussing our clinical trial portfolio.  The patient might be eligible for the ARASTEP study, which is a randomized trial of ADT with or without darolutamide, plus optional metastasis-directed radiotherapy for patients who have a positive PSMA-PET scan.  The second trial is the AMG-509 study utilizing a STEAP1-directed immunotherapy.  This trial avoids concurrent androgen deprivation and uses Xaluritamig in the absence of other hormonal agents.  The minimum PSA for eligibility is 1.0 ng/mL, and he has not reached this  level yet.  At the end of our detailed  discussion, the patient was intrigued by the AMG-509 trial, and I have provided the informed consent document.    We also reviewed the importance of germline and somatic genetic testing.  This patient does not have a very strong family history of cancer, thus I will begin by ordering somatic tumor DNA testing from his radical prostatectomy dated 4/7/2023 ([facility]).  If he is found to have a tumoral DNA-repair gene mutation, then I will certainly refer him for germline testing and genetic counseling afterwards.    A total of 80 minutes were spent on this patient on the date of the encounter conducting chart review, review of test results, interpretation of tests, patient visit, documentation and discussion with other provider(s).  The patient was given the opportunity to ask multiple questions today, all of which were answered to his satisfaction.    Giovanny Huff M.D.

## 2025-02-26 ENCOUNTER — ONCOLOGY VISIT (OUTPATIENT)
Dept: ONCOLOGY | Facility: CLINIC | Age: 80
End: 2025-02-26
Attending: UROLOGY
Payer: COMMERCIAL

## 2025-02-26 ENCOUNTER — DOCUMENTATION ONLY (OUTPATIENT)
Dept: ONCOLOGY | Facility: CLINIC | Age: 80
End: 2025-02-26

## 2025-02-26 ENCOUNTER — PRE VISIT (OUTPATIENT)
Dept: ONCOLOGY | Facility: CLINIC | Age: 80
End: 2025-02-26
Payer: COMMERCIAL

## 2025-02-26 ENCOUNTER — PATIENT OUTREACH (OUTPATIENT)
Dept: ONCOLOGY | Facility: CLINIC | Age: 80
End: 2025-02-26

## 2025-02-26 VITALS
HEIGHT: 74 IN | BODY MASS INDEX: 25.19 KG/M2 | SYSTOLIC BLOOD PRESSURE: 160 MMHG | HEART RATE: 78 BPM | TEMPERATURE: 98 F | RESPIRATION RATE: 16 BRPM | DIASTOLIC BLOOD PRESSURE: 88 MMHG | OXYGEN SATURATION: 96 % | WEIGHT: 196.3 LBS

## 2025-02-26 DIAGNOSIS — C61 PROSTATE CANCER (H): ICD-10-CM

## 2025-02-26 PROCEDURE — G0463 HOSPITAL OUTPT CLINIC VISIT: HCPCS | Performed by: INTERNAL MEDICINE

## 2025-02-26 ASSESSMENT — PAIN SCALES - GENERAL: PAINLEVEL_OUTOF10: NO PAIN (0)

## 2025-02-26 NOTE — LETTER
2/26/2025      Filemon Modi  [address] [id number]      Dear Colleague,    Thank you for referring your patient, Filemon Modi, to the [facility]. Please see a copy of my visit note below.      NEW PATIENT SECOND OPINION CONSULTATION    Reason for Visit:  Biochemically-recurrent prostate cancer s/p prostatectomy, with PSA 0.38 ng/mL and PSADT 4.7 months    History of Present Illness:  Dear Dr. Royal Escobedo,    Thank you for referring your patient for a Second Opinion Consultation at the [facility].  A brief overview of his oncological history as well as my recommendations follow below.    Mr. Modi he is a 79-year-old man who was diagnosed with prostate cancer about two years ago.  His PSA level on 1/17/2023 was found to be 7.3 ng/mL.  He underwent a prostate biopsy on 1/30/2023, which revealed prostate adenocarcinoma in 8 of 12 cores, with a maximum [place] grade of 3+4=7.  He underwent a radical prostatectomy on 4/7/2023.  This revealed [place] 3+4=7 prostatic adenocarcinoma with cribriform features, positive perineural invasion, positive extraprostatic extension, negative lymph nodes, and a positive surgical margin.  His final pathological stage was pT3a N0 R1.    Following prostatectomy, his PSA level was 0.03 ng/mL on 10/11/2023.  Over the next eighteen months, his PSA level has been rising.  On 1/16/2024 the PSA was 0.09, on 4/16/2024 the PSA was 0.13, on 7/24/2024 the PSA was 0.20, on 10/28/2024 the PSA was 0.27, and on 2/5/2025 the PSA was 0.38 ng/mL.  His PSA doubling time is calculated at 4.7 months.    The patient presents for a new consultation at the medical oncology clinic.  He would like to review his management options for high-risk biochemically-recurrent prostate cancer.  He is potentially interested in clinical trial participation, if available.    Review of Systems:  The patient reports feeling  generally well.  He does have urinary stress incontinence following his radical prostatectomy, which has stabilized over the past several months.  He also has erectile dysfunction, with partial improvement using Viagra.  He does not report any additional urinary symptoms or rectal symptoms at this time.  He has not lost or gained any weight recently.  He does not have any cancer-related pain.  A comprehensive 14-system review of symptoms is otherwise generally within normal limits.  His ECOG score is 0.  His pain score is 0/10.    Past Medical History:  Prostate cancer with biochemical recurrence  Hyperlipidemia  Hypertension  Hypothyroidism  Kidney stones  Cataracts  Hernia repairs  Appendectomy    Medications:  Aspirin 81 mg  Atorvastatin 20 mg  Synthroid 125 mcg  Benadryl 25 mg PRN  Tylenol 325 mg PRN  Viagra 50 mg PRN   Omega-3 fish oil  Vitamin D    Allergies:  No known drug allergies.    Social History:  The patient lives in [place], [place].  He does not have a tobacco history.  He reports occasional marijuana use.  He drinks 7 alcoholic beverages per week (typically wine).    Family History:  There is no family history of prostate cancer or breast/ovarian cancers.  His brother had lymphoma.  Another brother had small cell lung cancer.    Physical Examination:  Mr. Modi is a 79-year-old man who appears comfortable at rest.  His vital signs are generally unremarkable.  His HEENT examination is within normal limits.  There is no palpable supraclavicular, cervical, axillary or inguinal lymphadenopathy.  The cardiovascular, respiratory, and gastrointestinal examinations are generally within normal limits.  A digital rectal examination was not performed today.  The neuromuscular examination is grossly intact.  The extremities do not demonstrate pitting edema.  The skin evaluation is unremarkable.    ASSESSMENT AND PLAN:  Mr. Modi is a 79-year-old man with biochemically-recurrent prostate cancer s/p radical  prostatectomy (4/7/2023) with a PSA level of 0.38 ng/mL and a PSADT of 4.7 months.  His ECOG score is 0.  His pain score is 0/10.    This patient has a high-risk biochemical recurrence due to the rapid PSA doubling time (4.7 months) and the fact that his PSA level never became undetectable following prostatectomy.  Thus, we spent the first part of our consultation reviewing the natural history of high-risk biochemical recurrence.  In this context, for patients with a PSADT between 3-9 months, the expected median metastasis-free survival is about 4 years, and the expected median overall survival is about 10 years.  Therefore, there is a need to further improve outcomes in this setting with novel treatment strategies.    Before reviewing our clinical trial portfolio, I again reiterated that the standard salvage treatment option would be to undergo prostatic bed radiotherapy concurrently with at least 6 months of androgen deprivation.  The patient has informed me that he does not wish to undergo salvage radiation therapy, despite this being the only proven option with a curative potential.  Therefore, we spent the remainder of our consultation reviewing systemic treatment options and clinical trial participation.  Outside of a clinical trial, the patient could be treated with intermittent hormonal therapy consisting of ADT with or without enzalutamide.  Furthermore, some providers would consider the use of enzalutamide alone, although I generally discourage this approach due to the increased risk of gynecomastia and cardiovascular toxicity from the secondary estrogen surge.    We then spent some time discussing our clinical trial portfolio.  The patient might be eligible for the ARASTEP study, which is a randomized trial of ADT with or without darolutamide, plus optional metastasis-directed radiotherapy for patients who have a positive PSMA-PET scan.  The second trial is the AMG-509 study utilizing a STEAP1-directed  immunotherapy.  This trial avoids concurrent androgen deprivation and uses Xaluritamig in the absence of other hormonal agents.  The minimum PSA for eligibility is 1.0 ng/mL, and he has not reached this  level yet.  At the end of our detailed discussion, the patient was intrigued by the AMG-509 trial, and I have provided the informed consent document.    We also reviewed the importance of germline and somatic genetic testing.  This patient does not have a very strong family history of cancer, thus I will begin by ordering somatic tumor DNA testing from his radical prostatectomy dated 4/7/2023 ([facility]).  If he is found to have a tumoral DNA-repair gene mutation, then I will certainly refer him for germline testing and genetic counseling afterwards.    A total of 80 minutes were spent on this patient on the date of the encounter conducting chart review, review of test results, interpretation of tests, patient visit, documentation and discussion with other provider(s).  The patient was given the opportunity to ask multiple questions today, all of which were answered to his satisfaction.    Giovanny Huff M.D.      Again, thank you for allowing me to participate in the care of your patient.        Sincerely,        Giovanny Huff MD    Electronically signed

## 2025-02-26 NOTE — NURSING NOTE
"I was asked by Dr. Huff to contact this patient and send the consent form for the [id number].     Below is the message that was sent:     \"Hello,     My name is Zuri and I am a research nurse that works with Dr. Huff. Today you two discussed the possibility of participating in the  clinical trial and he asked me to send you the consent form. This form includes a plethora of information about the study, the medications, risks/benefits, etc. Please review this form carefully and share with whomever you please.     Please do not sign the form, this should only be used for your review.     I will plan to call you on Friday or Monday to see if you have any questions about the study. If anything comes up prior to then please do not hesitate to contact me via phone or email before then. My direct number is [phone].     Please note: I can answer your questions about research and this research study. I cannot discuss your health conditions or eligibility for this study via email without your authorization as this email is not secure. Other, more secure forms of communication exist to share your health condition. E-mails are always subject to some level of security risk, can be sent to the wrong address, and can be forwarded to others who should not receive them. Please ask if you would like to know your options for using more secure forms of communication with the research team.    Thank you and I look forward to speaking with you soon,     Zuri Kamara, MSN, RN  Clinical Research Coordinator, Nurse-Solid Tumor Unit  Clinical Trials Office   [facility] [place], [facility]  cancer.[facility]   P: [phone]: Pager: Kenneth GLASSO Address      The information transmitted in this e-mail is intended only for the person or entity to which it is addressed and may contain confidential and/or privileged material, including \"protected health information.\" If you are not the intended " "recipient, you are hereby notified that any review, retransmission, dissemination, distribution, or copying of this message is strictly prohibited. If you have received this communication in error, please destroy and delete this message from any computer and contact us immediately by return e-mail.\"     Zuri Kamara RN   Clinical Research Coordinator Nurse-Solid Tumor   Phone: [phone]         "

## 2025-02-26 NOTE — NURSING NOTE
"Oncology Rooming Note    February 26, 2025 12:40 PM   Filemon Modi is a 79 year old male who presents for:    Chief Complaint   Patient presents with    Oncology Clinic Visit     prostate cancer     Initial Vitals: BP (!) 181/83 (BP Location: Right arm, Patient Position: Sitting, Cuff Size: Adult Regular)   Pulse 78   Temp 98  F (36.7  C) (Oral)   Resp 16   Ht 1.867 m (6' 1.5\")   Wt 89 kg (196 lb 4.8 oz)   SpO2 96%   BMI 25.55 kg/m   Estimated body mass index is 25.55 kg/m  as calculated from the following:    Height as of this encounter: 1.867 m (6' 1.5\").    Weight as of this encounter: 89 kg (196 lb 4.8 oz). Body surface area is 2.15 meters squared.  No Pain (0) Comment: Data Unavailable   No LMP for male patient.  Allergies reviewed: Yes  Medications reviewed: Yes    Medications: Medication refills not needed today.  Pharmacy name entered into [employer]: [id number] [place], [place] - [address]    Frailty Screening:   Is the patient here for a new oncology consult visit in cancer care? 1. Yes. Over the past month, have you experienced difficulty or required a caregiver to assist with:   1. Balance, walking or general mobility (including any falls)? NO  2. Completion of self-care tasks such as bathing, dressing, toileting, grooming/hygiene?  NO  3. Concentration or memory that affects your daily life?  NO     PHQ9:  Did this patient require a PHQ9?: No      Clinical concerns: BP: 181/83, 2nd reading (manual): 160/88. Asymptomatic      Ellen Sullivan            "

## 2025-02-26 NOTE — PROGRESS NOTES
[facility]: Cancer Care Short Note                                                                                      Per Dr. Huff's request, completed requisition form sent to him for signature. Required pathology report (4/7/23), demographic information, and visit notes uploaded with the requisition. All forms will be sent to [facility] electronically when signature is complete.      Dawna Doss, RN, BSN  Oncology RN Care Coordinator  [facility]

## 2025-03-03 ENCOUNTER — TELEPHONE (OUTPATIENT)
Dept: ONCOLOGY | Facility: CLINIC | Age: 80
End: 2025-03-03
Payer: COMMERCIAL

## 2025-03-03 NOTE — TELEPHONE ENCOUNTER
[id number]: Study Follow-Up Note   Subject name: Filemon Modi     Date: 3/3/2025    He was contacted by the clinical research coordinator in regards to [facility] 509 Clinical Trial.    Filemon had read through the consent form. I answered questions regarding the hospitalization period, the observation for Cycle 1 Day 8-15 and C2D1. I did instruct the patient that although the consent form says there is a day 22 visit, we have change the schedule due to safety reasons and the day 22 visit is no longer occurring and he will have the C2D1 visit 2 weeks after D8. I explained the caregiver requirements, which patient is agreeable too. Patient also had questions regarding reimbursement which I explained to him. He is going to get another PSA value in May which is when he would like to make his decision. I did explain to him that the IRB just approved a change in the eligibility for this trial which allows patients with a PSA above or equal to 0.2 to be eligible.     Filemon Modi was given the opportunity to ask any trial related questions.    Filemon Modi expressed any concerns: no    Zuri Kamara RN

## 2025-03-13 ENCOUNTER — DOCUMENTATION ONLY (OUTPATIENT)
Dept: ONCOLOGY | Facility: CLINIC | Age: 80
End: 2025-03-13
Payer: COMMERCIAL

## 2025-03-13 LAB — SPECIMEN STATUS: NORMAL

## 2025-03-13 NOTE — PROGRESS NOTES
[facility] Report (3/12/2025):     FOXA1  p.F266*  [class 3A]   HOXB13  p.G84E  [germline]     MS - stable   TMB 4 muts/Mb   gLOH 5%   PD-L1 0%

## 2025-05-18 NOTE — PROGRESS NOTES
Virtual Visit Details    Type of service:  Video Visit  Originating Location (pt. Location):  Home    Distant Location (provider location):  [facility]  Platform used for Video Visit:  Kuhsal    -------------------------------------------------------------------------------------------------------------    FOLLOW UP VISIT  -  TELEMEDICINE      Reason for Visit:  Biochemically-recurrent prostate cancer s/p prostatectomy, with PSA 0.46 ng/mL and PSADT 7-8 months    History of Present Illness:  Mr. Modi he is a 80-year-old man who was diagnosed with prostate cancer two years ago.  His PSA level on 1/17/2023 was found to be 7.3 ng/mL.  He underwent a prostate biopsy on 1/30/2023, which revealed prostate adenocarcinoma Neponset grade 3+4=7.  He underwent radical prostatectomy on 4/7/2023.  This revealed [place] 3+4=7 prostatic adenocarcinoma with cribriform features, positive perineural invasion, positive extraprostatic extension, negative lymph nodes, and a positive surgical margin.  His final pathological stage was pT3a N0 R1.    Following prostatectomy, his PSA level was 0.03 ng/mL on 10/11/2023.  Over the next eighteen months, his PSA level has been rising.  On 1/16/2024 the PSA was 0.09, on 4/16/2024 the PSA was 0.13, on 7/24/2024 the PSA was 0.20, on 10/28/2024 the PSA was 0.27, and on 2/5/2025 the PSA was 0.38 ng/mL.  Most recently, on 5/19/2025 his PSA level was 0.46 ng/mL.  His PSA doubling time is calculated at 7-8 months.    The patient presents for a follow-up visit at the medical oncology clinic.  He would like to review his management options for high-risk biochemically-recurrent prostate cancer.  He is also potentially interested in clinical trial participation, if available.    Krystal Shelley (3/12/2025):  FOXA1 p.F266* [class 3A]  HOXB13 p.G84E [germline]  MS - stable  TMB 4 muts/Mb  gLOH 5%  PD-L1 0%    Review of Systems:  The patient reports feeling generally well.  He does  have urinary stress incontinence following his radical prostatectomy, which has stabilized over the past several months.  He also has erectile dysfunction, with partial improvement using Viagra.  He does not report any additional urinary symptoms or rectal symptoms at this time.  He has not lost or gained any weight recently.  He does not have any cancer-related pain.  A comprehensive 14-system review of symptoms is otherwise generally within normal limits.  His ECOG score is 0.  His pain score is 0/10.    Past Medical History:  Prostate cancer   Hyperlipidemia  Hypertension  Hypothyroidism  Kidney stones  Cataracts  Hernia repairs  Appendectomy    Medications:  Aspirin 81 mg  Atorvastatin 20 mg  Synthroid 125 mcg  Benadryl 25 mg PRN  Tylenol 325 mg PRN  Viagra 50 mg PRN   Omega-3 fish oil  Vitamin D    Family History:  There is no family history of prostate cancer or breast/ovarian cancers.  His brother had lymphoma.  Another brother had small cell lung cancer.  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    Physical Examination:  Mr. Modi is a 80-year-old man who appears comfortable at rest.  His vital signs are generally unremarkable.  His HEENT examination is within normal limits.  There is no palpable supraclavicular, cervical, axillary or inguinal lymphadenopathy.  The cardiovascular, respiratory, and gastrointestinal examinations are generally within normal limits.  A digital rectal examination was not performed today.  The neuromuscular examination is grossly intact.  The extremities do not demonstrate pitting edema.  The skin evaluation is unremarkable.      ASSESSMENT AND PLAN:  Mr. Modi is a 80-year-old man with biochemically-recurrent prostate cancer s/p radical prostatectomy (4/7/2023) with a PSA level of 0.46 ng/mL and a PSADT of 7-8 months.  His ECOG score is 0.  His pain score is 0/10.    This patient has a high-risk biochemical recurrence due to the rapid PSA doubling time (7-8 months) and the fact  that his PSA level never became undetectable following prostatectomy.  Thus, we spent the first part of our consultation reviewing the natural history of high-risk biochemical recurrence.  In this context, for patients with a PSADT between 3-9 months, the expected median metastasis-free survival is about 4 years, and the expected median overall survival is about 10 years.  Therefore, there is a need to further improve outcomes in this setting with novel treatment strategies.  I again reiterated that the standard salvage treatment option would be to undergo prostatic bed radiotherapy concurrently with at least 6 months of androgen deprivation.  The patient has informed me that he does not wish to undergo salvage radiation therapy, despite this being the only proven option with a curative potential.  Therefore, we spent the remainder of our consultation reviewing systemic treatment options and clinical trial participation.      Outside of a clinical trial, the patient could be treated with intermittent hormonal therapy consisting of ADT with or without enzalutamide.  Furthermore, some physicians would consider the use of enzalutamide alone, although I generally discourage this approach due to the increased risk of gynecomastia and cardiovascular toxicity.  We then spent some time discussing our clinical trial portfolio.  The patient might be eligible for the ARASTEP study, which is a randomized trial of ADT with or without darolutamide, plus optional metastasis-directed radiotherapy for patients who have a positive PSMA-PET scan.  The second trial is the AMG-509 study utilizing a STEAP1-directed immunotherapy.  This trial avoids concurrent androgen deprivation and uses Xaluritamig in the absence of other hormonal agents.  The minimum post-prostatectomy PSA for eligibility is 0.2 ng/mL, so he has already met that threshold.  At the end of our detailed previous discussion, the patient was intrigued by the AMG-509 trial,  and I had provided the informed consent document.  The patient has decided to proceed with another PSA test and a PSMA-PET scan in late 8/2025.  He is also very intrigued by the AMG-509 clinical trial, but he wishes to defer his decision until the end of the summer.    A total of 40 minutes were spent on this patient on the date of the encounter conducting chart review, review of test results, interpretation of tests, patient visit, documentation and discussion with other provider(s).  The patient was given the opportunity to ask multiple questions today, all of which were answered to his satisfaction.    Giovanny Huff M.D.

## 2025-05-19 ENCOUNTER — LAB (OUTPATIENT)
Dept: LAB | Facility: CLINIC | Age: 80
End: 2025-05-19
Payer: COMMERCIAL

## 2025-05-19 DIAGNOSIS — C61 MALIGNANT NEOPLASM OF PROSTATE (H): ICD-10-CM

## 2025-05-19 DIAGNOSIS — C61 PROSTATE CANCER (H): ICD-10-CM

## 2025-05-19 LAB — PSA SERPL DL<=0.01 NG/ML-MCNC: 0.46 NG/ML (ref 0–6.5)

## 2025-05-19 PROCEDURE — 84403 ASSAY OF TOTAL TESTOSTERONE: CPT | Performed by: INTERNAL MEDICINE

## 2025-05-19 PROCEDURE — 36415 COLL VENOUS BLD VENIPUNCTURE: CPT | Performed by: PATHOLOGY

## 2025-05-19 PROCEDURE — 84153 ASSAY OF PSA TOTAL: CPT | Performed by: PATHOLOGY

## 2025-05-19 PROCEDURE — 99000 SPECIMEN HANDLING OFFICE-LAB: CPT | Performed by: PATHOLOGY

## 2025-05-21 ENCOUNTER — VIRTUAL VISIT (OUTPATIENT)
Dept: ONCOLOGY | Facility: CLINIC | Age: 80
End: 2025-05-21
Attending: INTERNAL MEDICINE
Payer: COMMERCIAL

## 2025-05-21 VITALS — WEIGHT: 188 LBS | HEIGHT: 75 IN | BODY MASS INDEX: 23.38 KG/M2

## 2025-05-21 DIAGNOSIS — C61 MALIGNANT NEOPLASM OF PROSTATE (H): Primary | ICD-10-CM

## 2025-05-21 LAB — TESTOST SERPL-MCNC: 417 NG/DL (ref 240–950)

## 2025-05-21 ASSESSMENT — PAIN SCALES - GENERAL: PAINLEVEL_OUTOF10: NO PAIN (0)

## 2025-05-21 NOTE — NURSING NOTE
Is the patient currently in the state of [place]? YES    Visit mode: VIDEO    If the visit is dropped, the patient can be reconnected by:VIDEO VISIT: Send to e-mail at: [email or web address]    Will anyone else be joining the visit? NO  (If patient encounters technical issues they should call [phone]    Are changes needed to the allergy or medication list? No    Are refills needed on medications prescribed by this physician? NO    Rooming Documentation:  Questionnaire(s) completed    Reason for visit: Video Visit (Follow Up)    Jennifer CRAWLEY

## 2025-05-21 NOTE — LETTER
5/21/2025      Filemon Modi  [address] [id number]      Dear Colleague,    Thank you for referring your patient, Filemon Modi, to the [facility]. Please see a copy of my visit note below.      Virtual Visit Details    Type of service:  Video Visit  Originating Location (pt. Location):  Home    Distant Location (provider location):  [facility]  Platform used for Video Visit:  Kushal    -------------------------------------------------------------------------------------------------------------    FOLLOW UP VISIT  -  TELEMEDICINE      Reason for Visit:  Biochemically-recurrent prostate cancer s/p prostatectomy, with PSA 0.46 ng/mL and PSADT 7-8 months    History of Present Illness:  Mr. Modi he is a 80-year-old man who was diagnosed with prostate cancer two years ago.  His PSA level on 1/17/2023 was found to be 7.3 ng/mL.  He underwent a prostate biopsy on 1/30/2023, which revealed prostate adenocarcinoma [place] grade 3+4=7.  He underwent radical prostatectomy on 4/7/2023.  This revealed [place] 3+4=7 prostatic adenocarcinoma with cribriform features, positive perineural invasion, positive extraprostatic extension, negative lymph nodes, and a positive surgical margin.  His final pathological stage was pT3a N0 R1.    Following prostatectomy, his PSA level was 0.03 ng/mL on 10/11/2023.  Over the next eighteen months, his PSA level has been rising.  On 1/16/2024 the PSA was 0.09, on 4/16/2024 the PSA was 0.13, on 7/24/2024 the PSA was 0.20, on 10/28/2024 the PSA was 0.27, and on 2/5/2025 the PSA was 0.38 ng/mL.  Most recently, on 5/19/2025 his PSA level was 0.46 ng/mL.  His PSA doubling time is calculated at 7-8 months.    The patient presents for a follow-up visit at the medical oncology clinic.  He would like to review his management options for high-risk biochemically-recurrent prostate cancer.  He is also potentially interested in  clinical trial participation, if available.    Krystal Report (3/12/2025):  FOXA1 p.F266* [class 3A]  HOXB13 p.G84E [germline]  MS - stable  TMB 4 muts/Mb  gLOH 5%  PD-L1 0%    Review of Systems:  The patient reports feeling generally well.  He does have urinary stress incontinence following his radical prostatectomy, which has stabilized over the past several months.  He also has erectile dysfunction, with partial improvement using Viagra.  He does not report any additional urinary symptoms or rectal symptoms at this time.  He has not lost or gained any weight recently.  He does not have any cancer-related pain.  A comprehensive 14-system review of symptoms is otherwise generally within normal limits.  His ECOG score is 0.  His pain score is 0/10.    Past Medical History:  Prostate cancer   Hyperlipidemia  Hypertension  Hypothyroidism  Kidney stones  Cataracts  Hernia repairs  Appendectomy    Medications:  Aspirin 81 mg  Atorvastatin 20 mg  Synthroid 125 mcg  Benadryl 25 mg PRN  Tylenol 325 mg PRN  Viagra 50 mg PRN   Omega-3 fish oil  Vitamin D    Family History:  There is no family history of prostate cancer or breast/ovarian cancers.  His brother had lymphoma.  Another brother had small cell lung cancer.  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    Physical Examination:  Mr. Modi is a 80-year-old man who appears comfortable at rest.  His vital signs are generally unremarkable.  His HEENT examination is within normal limits.  There is no palpable supraclavicular, cervical, axillary or inguinal lymphadenopathy.  The cardiovascular, respiratory, and gastrointestinal examinations are generally within normal limits.  A digital rectal examination was not performed today.  The neuromuscular examination is grossly intact.  The extremities do not demonstrate pitting edema.  The skin evaluation is unremarkable.      ASSESSMENT AND PLAN:  Mr. Modi is a 80-year-old man with biochemically-recurrent prostate cancer s/p  radical prostatectomy (4/7/2023) with a PSA level of 0.46 ng/mL and a PSADT of 7-8 months.  His ECOG score is 0.  His pain score is 0/10.    This patient has a high-risk biochemical recurrence due to the rapid PSA doubling time (7-8 months) and the fact that his PSA level never became undetectable following prostatectomy.  Thus, we spent the first part of our consultation reviewing the natural history of high-risk biochemical recurrence.  In this context, for patients with a PSADT between 3-9 months, the expected median metastasis-free survival is about 4 years, and the expected median overall survival is about 10 years.  Therefore, there is a need to further improve outcomes in this setting with novel treatment strategies.  I again reiterated that the standard salvage treatment option would be to undergo prostatic bed radiotherapy concurrently with at least 6 months of androgen deprivation.  The patient has informed me that he does not wish to undergo salvage radiation therapy, despite this being the only proven option with a curative potential.  Therefore, we spent the remainder of our consultation reviewing systemic treatment options and clinical trial participation.      Outside of a clinical trial, the patient could be treated with intermittent hormonal therapy consisting of ADT with or without enzalutamide.  Furthermore, some physicians would consider the use of enzalutamide alone, although I generally discourage this approach due to the increased risk of gynecomastia and cardiovascular toxicity.  We then spent some time discussing our clinical trial portfolio.  The patient might be eligible for the ARASTEP study, which is a randomized trial of ADT with or without darolutamide, plus optional metastasis-directed radiotherapy for patients who have a positive PSMA-PET scan.  The second trial is the AMG-509 study utilizing a STEAP1-directed immunotherapy.  This trial avoids concurrent androgen deprivation and uses  Xaluritamig in the absence of other hormonal agents.  The minimum post-prostatectomy PSA for eligibility is 0.2 ng/mL, so he has already met that threshold.  At the end of our detailed previous discussion, the patient was intrigued by the AMG-509 trial, and I had provided the informed consent document.  The patient has decided to proceed with another PSA test and a PSMA-PET scan in late 8/2025.  He is also very intrigued by the AMG-509 clinical trial, but he wishes to defer his decision until the end of the summer.    A total of 40 minutes were spent on this patient on the date of the encounter conducting chart review, review of test results, interpretation of tests, patient visit, documentation and discussion with other provider(s).  The patient was given the opportunity to ask multiple questions today, all of which were answered to his satisfaction.    Giovanny Huff M.D.      Again, thank you for allowing me to participate in the care of your patient.        Sincerely,        Giovanny Huff MD    Electronically signed

## 2025-05-22 DIAGNOSIS — H35.373 EPIRETINAL MEMBRANE (ERM) OF BOTH EYES: Primary | ICD-10-CM

## 2025-06-03 ENCOUNTER — OFFICE VISIT (OUTPATIENT)
Dept: OPHTHALMOLOGY | Facility: CLINIC | Age: 80
End: 2025-06-03
Attending: OPHTHALMOLOGY
Payer: COMMERCIAL

## 2025-06-03 DIAGNOSIS — H35.373 EPIRETINAL MEMBRANE (ERM) OF BOTH EYES: ICD-10-CM

## 2025-06-03 DIAGNOSIS — H35.3131 EARLY DRY STAGE NONEXUDATIVE AGE-RELATED MACULAR DEGENERATION OF BOTH EYES: Primary | ICD-10-CM

## 2025-06-03 DIAGNOSIS — Z86.69 HISTORY OF RETINAL DETACHMENT: ICD-10-CM

## 2025-06-03 PROCEDURE — 92134 CPTRZ OPH DX IMG PST SGM RTA: CPT | Performed by: OPHTHALMOLOGY

## 2025-06-03 PROCEDURE — 92015 DETERMINE REFRACTIVE STATE: CPT

## 2025-06-03 PROCEDURE — G0463 HOSPITAL OUTPT CLINIC VISIT: HCPCS | Performed by: OPHTHALMOLOGY

## 2025-06-03 ASSESSMENT — REFRACTION_WEARINGRX
OD_AXIS: 090
OD_CYLINDER: +0.50
OS_SPHERE: -3.75
OS_CYLINDER: +1.75
OD_SPHERE: -2.25
OS_ADD: +2.50
OD_ADD: +2.50
OS_AXIS: 115

## 2025-06-03 ASSESSMENT — REFRACTION_MANIFEST
OD_CYLINDER: +0.25
OD_ADD: +2.00
OS_AXIS: 115
OD_SPHERE: -2.00
OS_ADD: +2.00
OS_SPHERE: -3.50
OS_CYLINDER: +1.50
OD_AXIS: 090

## 2025-06-03 ASSESSMENT — TONOMETRY
IOP_METHOD: TONOPEN
OS_IOP_MMHG: 10
OD_IOP_MMHG: 10

## 2025-06-03 ASSESSMENT — CONF VISUAL FIELD
OS_INFERIOR_NASAL_RESTRICTION: 0
OD_INFERIOR_TEMPORAL_RESTRICTION: 0
METHOD: COUNTING FINGERS
OS_INFERIOR_TEMPORAL_RESTRICTION: 0
OS_NORMAL: 1
OD_NORMAL: 1
OD_INFERIOR_NASAL_RESTRICTION: 0
OS_SUPERIOR_TEMPORAL_RESTRICTION: 0
OD_SUPERIOR_TEMPORAL_RESTRICTION: 0
OD_SUPERIOR_NASAL_RESTRICTION: 0
OS_SUPERIOR_NASAL_RESTRICTION: 0

## 2025-06-03 ASSESSMENT — EXTERNAL EXAM - LEFT EYE: OS_EXAM: NORMAL

## 2025-06-03 ASSESSMENT — VISUAL ACUITY
OD_CC: 20/20
METHOD: SNELLEN - LINEAR
OS_CC: 20/20
OS_CC+: -2

## 2025-06-03 ASSESSMENT — EXTERNAL EXAM - RIGHT EYE: OD_EXAM: NORMAL

## 2025-06-03 ASSESSMENT — SLIT LAMP EXAM - LIDS
COMMENTS: NORMAL
COMMENTS: NORMAL

## 2025-06-03 ASSESSMENT — CUP TO DISC RATIO
OD_RATIO: 0.3
OS_RATIO: 0.2

## 2025-06-03 NOTE — PROGRESS NOTES
CC -   H/o RD OU    INTERVAL HISTORY - No changes since PORTIA with me, not bothered by floaters    PMH -   Filemon Modi is a  80 year old  patient with history of retinal detachment repair both eyes with SBP + small gas bubble.  OS VA worse than OD all life, unsure if ever correctable to 20/20, large anisometropia.      PAST OCULAR SURGERY  SBP OS 2006 ([facility])  SBP OD 2011 ([facility])  CE/IOL OU  2019 ([facility])      RETINAL IMAGING:  OCT  06/03/2025   OD - tr ERM, PVD, stable   OS - tr ERM, focal outer irregular, PVD, stable       ASSESSMENT & PLAN    #. H/o RD repair OU with SBP + gas bubble   - 2006 OS & 2011 OD   - retina flat today     #. tr ERM OU   - trace on OCT   - NVS   - Good vision acuity.       # Early dry AMD OU   - focal outer irregular OS on OCT    - regular veggie consumption   - could consider AREDS   - no h/o smoking      #.  PVD OU   - S/Sx RD d/w patient  6/2025    #. Pseudophakia each eye    - Cataract surgery in 2019   - doing well. VAcc 20/20, 20/25    #  Anisometropia   - per patient prior WRx doesn't incorporate full OS correction   - less after CE/IOL      Return in about 1 year (around 6/3/2026) for DFE OU, OCT OU, [employer].         ATTESTATION     Attending Attestation:     Complete documentation of historical and exam elements from today's encounter can be found in the full encounter summary report (not reduplicated in this progress note).  I personally obtained the chief complaint(s) and history of present illness.  I confirmed and edited as necessary the review of systems, past medical/surgical history, family history, social history, and examination findings as documented by others; and I examined the patient myself.  I personally reviewed the relevant tests, images, and reports as documented above.  I formulated and edited as necessary the assessment and plan and discussed the findings and management plan with the patient and family    Nicolette Lipscomb MD, PhD  ,  Vitreoretinal Surgery  Department of Ophthalmology  [facility]

## 2025-06-03 NOTE — NURSING NOTE
Chief Complaints and History of Present Illnesses   Patient presents with    Epiretinal Membrane Follow Up     Chief Complaint(s) and History of Present Illness(es)       Epiretinal Membrane Follow Up              Laterality: both eyes    Associated symptoms: Negative for flashes and floaters    Treatments tried: artificial tears    Pain scale: 0/10              Comments    The patient reports stable vision.  He uses Lumify on occasion for red eyes.  CANDY Coles, COA 7:56 AM 06/03/2025

## 2025-06-21 ENCOUNTER — HEALTH MAINTENANCE LETTER (OUTPATIENT)
Age: 80
End: 2025-06-21

## 2025-08-17 ENCOUNTER — APPOINTMENT (OUTPATIENT)
Dept: CT IMAGING | Facility: CLINIC | Age: 80
End: 2025-08-17
Attending: STUDENT IN AN ORGANIZED HEALTH CARE EDUCATION/TRAINING PROGRAM
Payer: COMMERCIAL

## 2025-08-17 ENCOUNTER — APPOINTMENT (OUTPATIENT)
Dept: MRI IMAGING | Facility: CLINIC | Age: 80
End: 2025-08-17
Attending: STUDENT IN AN ORGANIZED HEALTH CARE EDUCATION/TRAINING PROGRAM
Payer: COMMERCIAL

## 2025-08-17 ENCOUNTER — APPOINTMENT (OUTPATIENT)
Dept: PHYSICAL THERAPY | Facility: CLINIC | Age: 80
End: 2025-08-17
Attending: STUDENT IN AN ORGANIZED HEALTH CARE EDUCATION/TRAINING PROGRAM
Payer: COMMERCIAL

## 2025-08-17 ENCOUNTER — HOSPITAL ENCOUNTER (EMERGENCY)
Facility: CLINIC | Age: 80
Discharge: HOME | End: 2025-08-18
Attending: STUDENT IN AN ORGANIZED HEALTH CARE EDUCATION/TRAINING PROGRAM | Admitting: STUDENT IN AN ORGANIZED HEALTH CARE EDUCATION/TRAINING PROGRAM
Payer: COMMERCIAL

## 2025-08-17 VITALS
HEIGHT: 74 IN | DIASTOLIC BLOOD PRESSURE: 88 MMHG | BODY MASS INDEX: 24.92 KG/M2 | OXYGEN SATURATION: 100 % | SYSTOLIC BLOOD PRESSURE: 152 MMHG | HEART RATE: 101 BPM | WEIGHT: 194.2 LBS | TEMPERATURE: 98.3 F | RESPIRATION RATE: 18 BRPM

## 2025-08-17 DIAGNOSIS — M89.9 BONE LESION: ICD-10-CM

## 2025-08-17 DIAGNOSIS — M35.3 POLYMYALGIA: Primary | ICD-10-CM

## 2025-08-17 DIAGNOSIS — R97.21 RISING PSA FOLLOWING TREATMENT FOR MALIGNANT NEOPLASM OF PROSTATE: ICD-10-CM

## 2025-08-17 LAB
ALBUMIN SERPL BCG-MCNC: 3.7 G/DL (ref 3.5–5.2)
ALP SERPL-CCNC: 85 U/L (ref 40–150)
ALT SERPL W P-5'-P-CCNC: 11 U/L (ref 0–70)
ANION GAP SERPL CALCULATED.3IONS-SCNC: 10 MMOL/L (ref 7–15)
APTT PPP: 33 SECONDS (ref 22–38)
AST SERPL W P-5'-P-CCNC: 16 U/L (ref 0–45)
BASOPHILS # BLD AUTO: 0.03 10E3/UL (ref 0–0.2)
BASOPHILS NFR BLD AUTO: 0.3 %
BILIRUB SERPL-MCNC: 0.5 MG/DL
BUN SERPL-MCNC: 13.8 MG/DL (ref 8–23)
CALCIUM SERPL-MCNC: 9.5 MG/DL (ref 8.8–10.4)
CHLORIDE SERPL-SCNC: 103 MMOL/L (ref 98–107)
CK SERPL-CCNC: 52 U/L (ref 39–308)
CREAT SERPL-MCNC: 0.72 MG/DL (ref 0.67–1.17)
CRP SERPL-MCNC: 152 MG/L
EGFRCR SERPLBLD CKD-EPI 2021: >90 ML/MIN/1.73M2
EOSINOPHIL # BLD AUTO: 0.06 10E3/UL (ref 0–0.7)
EOSINOPHIL NFR BLD AUTO: 0.6 %
ERYTHROCYTE [DISTWIDTH] IN BLOOD BY AUTOMATED COUNT: 11.6 % (ref 10–15)
ERYTHROCYTE [SEDIMENTATION RATE] IN BLOOD BY WESTERGREN METHOD: 39 MM/HR (ref 0–20)
GLUCOSE SERPL-MCNC: 116 MG/DL (ref 70–99)
HCO3 SERPL-SCNC: 27 MMOL/L (ref 22–29)
HCT VFR BLD AUTO: 42.6 % (ref 40–53)
HGB BLD-MCNC: 13.8 G/DL (ref 13.3–17.7)
IMM GRANULOCYTES # BLD: 0.05 10E3/UL
IMM GRANULOCYTES NFR BLD: 0.5 %
INR PPP: 1.11 (ref 0.85–1.15)
LACTATE SERPL-SCNC: 1.4 MMOL/L (ref 0.7–2)
LYMPHOCYTES # BLD AUTO: 0.79 10E3/UL (ref 0.8–5.3)
LYMPHOCYTES NFR BLD AUTO: 8.4 %
MAGNESIUM SERPL-MCNC: 2.2 MG/DL (ref 1.7–2.3)
MCH RBC QN AUTO: 29.4 PG (ref 26.5–33)
MCHC RBC AUTO-ENTMCNC: 32.4 G/DL (ref 31.5–36.5)
MCV RBC AUTO: 90.6 FL (ref 78–100)
MONOCYTES # BLD AUTO: 0.87 10E3/UL (ref 0–1.3)
MONOCYTES NFR BLD AUTO: 9.3 %
NEUTROPHILS # BLD AUTO: 7.57 10E3/UL (ref 1.6–8.3)
NEUTROPHILS NFR BLD AUTO: 80.9 %
NRBC # BLD AUTO: <0.03 10E3/UL
NRBC BLD AUTO-RTO: 0 /100
PLATELET # BLD AUTO: 394 10E3/UL (ref 150–450)
POTASSIUM SERPL-SCNC: 4.9 MMOL/L (ref 3.4–5.3)
PROT SERPL-MCNC: 6.8 G/DL (ref 6.4–8.3)
PROTHROMBIN TIME: 14.6 SECONDS (ref 11.8–14.8)
PSA SERPL DL<=0.01 NG/ML-MCNC: 0.73 NG/ML (ref 0–6.5)
RBC # BLD AUTO: 4.7 10E6/UL (ref 4.4–5.9)
SODIUM SERPL-SCNC: 140 MMOL/L (ref 135–145)
WBC # BLD AUTO: 9.37 10E3/UL (ref 4–11)

## 2025-08-17 PROCEDURE — 250N000013 HC RX MED GY IP 250 OP 250 PS 637: Performed by: STUDENT IN AN ORGANIZED HEALTH CARE EDUCATION/TRAINING PROGRAM

## 2025-08-17 PROCEDURE — 72156 MRI NECK SPINE W/O & W/DYE: CPT

## 2025-08-17 PROCEDURE — 72128 CT CHEST SPINE W/O DYE: CPT

## 2025-08-17 PROCEDURE — 74177 CT ABD & PELVIS W/CONTRAST: CPT

## 2025-08-17 PROCEDURE — 72125 CT NECK SPINE W/O DYE: CPT | Mod: 26 | Performed by: STUDENT IN AN ORGANIZED HEALTH CARE EDUCATION/TRAINING PROGRAM

## 2025-08-17 PROCEDURE — 36415 COLL VENOUS BLD VENIPUNCTURE: CPT | Performed by: STUDENT IN AN ORGANIZED HEALTH CARE EDUCATION/TRAINING PROGRAM

## 2025-08-17 PROCEDURE — 83735 ASSAY OF MAGNESIUM: CPT | Performed by: STUDENT IN AN ORGANIZED HEALTH CARE EDUCATION/TRAINING PROGRAM

## 2025-08-17 PROCEDURE — 85004 AUTOMATED DIFF WBC COUNT: CPT | Performed by: STUDENT IN AN ORGANIZED HEALTH CARE EDUCATION/TRAINING PROGRAM

## 2025-08-17 PROCEDURE — G0103 PSA SCREENING: HCPCS | Performed by: STUDENT IN AN ORGANIZED HEALTH CARE EDUCATION/TRAINING PROGRAM

## 2025-08-17 PROCEDURE — 97161 PT EVAL LOW COMPLEX 20 MIN: CPT | Mod: GP

## 2025-08-17 PROCEDURE — 86140 C-REACTIVE PROTEIN: CPT | Performed by: STUDENT IN AN ORGANIZED HEALTH CARE EDUCATION/TRAINING PROGRAM

## 2025-08-17 PROCEDURE — 255N000002 HC RX 255 OP 636: Performed by: STUDENT IN AN ORGANIZED HEALTH CARE EDUCATION/TRAINING PROGRAM

## 2025-08-17 PROCEDURE — A9585 GADOBUTROL INJECTION: HCPCS | Performed by: STUDENT IN AN ORGANIZED HEALTH CARE EDUCATION/TRAINING PROGRAM

## 2025-08-17 PROCEDURE — 97110 THERAPEUTIC EXERCISES: CPT | Mod: GP

## 2025-08-17 PROCEDURE — 83605 ASSAY OF LACTIC ACID: CPT | Performed by: STUDENT IN AN ORGANIZED HEALTH CARE EDUCATION/TRAINING PROGRAM

## 2025-08-17 PROCEDURE — 99285 EMERGENCY DEPT VISIT HI MDM: CPT | Mod: 25 | Performed by: STUDENT IN AN ORGANIZED HEALTH CARE EDUCATION/TRAINING PROGRAM

## 2025-08-17 PROCEDURE — 72131 CT LUMBAR SPINE W/O DYE: CPT | Mod: 26 | Performed by: STUDENT IN AN ORGANIZED HEALTH CARE EDUCATION/TRAINING PROGRAM

## 2025-08-17 PROCEDURE — 85730 THROMBOPLASTIN TIME PARTIAL: CPT | Performed by: STUDENT IN AN ORGANIZED HEALTH CARE EDUCATION/TRAINING PROGRAM

## 2025-08-17 PROCEDURE — 97116 GAIT TRAINING THERAPY: CPT | Mod: GP

## 2025-08-17 PROCEDURE — 82550 ASSAY OF CK (CPK): CPT | Performed by: STUDENT IN AN ORGANIZED HEALTH CARE EDUCATION/TRAINING PROGRAM

## 2025-08-17 PROCEDURE — 82310 ASSAY OF CALCIUM: CPT | Performed by: STUDENT IN AN ORGANIZED HEALTH CARE EDUCATION/TRAINING PROGRAM

## 2025-08-17 PROCEDURE — 72125 CT NECK SPINE W/O DYE: CPT

## 2025-08-17 PROCEDURE — 99285 EMERGENCY DEPT VISIT HI MDM: CPT | Performed by: STUDENT IN AN ORGANIZED HEALTH CARE EDUCATION/TRAINING PROGRAM

## 2025-08-17 PROCEDURE — 97530 THERAPEUTIC ACTIVITIES: CPT | Mod: GP

## 2025-08-17 PROCEDURE — 72156 MRI NECK SPINE W/O & W/DYE: CPT | Mod: 26 | Performed by: RADIOLOGY

## 2025-08-17 PROCEDURE — 72158 MRI LUMBAR SPINE W/O & W/DYE: CPT

## 2025-08-17 PROCEDURE — 72131 CT LUMBAR SPINE W/O DYE: CPT

## 2025-08-17 PROCEDURE — 72128 CT CHEST SPINE W/O DYE: CPT | Mod: 26 | Performed by: STUDENT IN AN ORGANIZED HEALTH CARE EDUCATION/TRAINING PROGRAM

## 2025-08-17 PROCEDURE — 72158 MRI LUMBAR SPINE W/O & W/DYE: CPT | Mod: 26 | Performed by: RADIOLOGY

## 2025-08-17 PROCEDURE — 85610 PROTHROMBIN TIME: CPT | Performed by: STUDENT IN AN ORGANIZED HEALTH CARE EDUCATION/TRAINING PROGRAM

## 2025-08-17 PROCEDURE — 85652 RBC SED RATE AUTOMATED: CPT | Performed by: STUDENT IN AN ORGANIZED HEALTH CARE EDUCATION/TRAINING PROGRAM

## 2025-08-17 PROCEDURE — 74177 CT ABD & PELVIS W/CONTRAST: CPT | Mod: 26 | Performed by: RADIOLOGY

## 2025-08-17 PROCEDURE — 250N000011 HC RX IP 250 OP 636: Performed by: STUDENT IN AN ORGANIZED HEALTH CARE EDUCATION/TRAINING PROGRAM

## 2025-08-17 RX ORDER — OXYCODONE HYDROCHLORIDE 5 MG/1
5 TABLET ORAL EVERY 6 HOURS PRN
Qty: 12 TABLET | Refills: 0 | Status: SHIPPED | OUTPATIENT
Start: 2025-08-17 | End: 2025-08-20

## 2025-08-17 RX ORDER — OXYCODONE HYDROCHLORIDE 5 MG/1
5 TABLET ORAL ONCE
Refills: 0 | Status: COMPLETED | OUTPATIENT
Start: 2025-08-17 | End: 2025-08-17

## 2025-08-17 RX ORDER — GADOBUTROL 604.72 MG/ML
8.5 INJECTION INTRAVENOUS ONCE
Status: COMPLETED | OUTPATIENT
Start: 2025-08-17 | End: 2025-08-17

## 2025-08-17 RX ORDER — METHYLPREDNISOLONE 4 MG/1
TABLET ORAL
Qty: 21 TABLET | Refills: 0 | Status: SHIPPED | OUTPATIENT
Start: 2025-08-17

## 2025-08-17 RX ORDER — NAPROXEN 500 MG/1
500 TABLET ORAL ONCE
Status: DISCONTINUED | OUTPATIENT
Start: 2025-08-17 | End: 2025-08-18 | Stop reason: HOSPADM

## 2025-08-17 RX ORDER — ACETAMINOPHEN 500 MG
1000 TABLET ORAL ONCE
Status: COMPLETED | OUTPATIENT
Start: 2025-08-17 | End: 2025-08-17

## 2025-08-17 RX ORDER — IOPAMIDOL 755 MG/ML
119 INJECTION, SOLUTION INTRAVASCULAR ONCE
Status: COMPLETED | OUTPATIENT
Start: 2025-08-17 | End: 2025-08-17

## 2025-08-17 RX ADMIN — GADOBUTROL 8.5 ML: 604.72 INJECTION INTRAVENOUS at 12:54

## 2025-08-17 RX ADMIN — ACETAMINOPHEN 1000 MG: 500 TABLET ORAL at 10:07

## 2025-08-17 RX ADMIN — IOPAMIDOL 119 ML: 755 INJECTION, SOLUTION INTRAVENOUS at 11:21

## 2025-08-17 RX ADMIN — OXYCODONE HYDROCHLORIDE 5 MG: 5 TABLET ORAL at 10:07

## 2025-08-17 ASSESSMENT — ACTIVITIES OF DAILY LIVING (ADL)
ADLS_ACUITY_SCORE: 46

## 2025-08-17 ASSESSMENT — COLUMBIA-SUICIDE SEVERITY RATING SCALE - C-SSRS
6. HAVE YOU EVER DONE ANYTHING, STARTED TO DO ANYTHING, OR PREPARED TO DO ANYTHING TO END YOUR LIFE?: NO
2. HAVE YOU ACTUALLY HAD ANY THOUGHTS OF KILLING YOURSELF IN THE PAST MONTH?: NO
1. IN THE PAST MONTH, HAVE YOU WISHED YOU WERE DEAD OR WISHED YOU COULD GO TO SLEEP AND NOT WAKE UP?: NO

## 2025-08-18 DIAGNOSIS — C61 MALIGNANT NEOPLASM OF PROSTATE (H): Primary | ICD-10-CM

## 2025-08-18 ASSESSMENT — ACTIVITIES OF DAILY LIVING (ADL)
ADLS_ACUITY_SCORE: 46

## 2025-08-21 ENCOUNTER — LAB (OUTPATIENT)
Dept: LAB | Facility: CLINIC | Age: 80
End: 2025-08-21
Payer: COMMERCIAL

## 2025-08-21 DIAGNOSIS — C61 MALIGNANT NEOPLASM OF PROSTATE (H): ICD-10-CM

## 2025-08-21 LAB
ALBUMIN SERPL BCG-MCNC: 3.8 G/DL (ref 3.5–5.2)
ALP SERPL-CCNC: 85 U/L (ref 40–150)
ALT SERPL W P-5'-P-CCNC: 21 U/L (ref 0–70)
ANION GAP SERPL CALCULATED.3IONS-SCNC: 13 MMOL/L (ref 7–15)
AST SERPL W P-5'-P-CCNC: 26 U/L (ref 0–45)
BASOPHILS # BLD AUTO: 0.03 10E3/UL (ref 0–0.2)
BASOPHILS NFR BLD AUTO: 0.4 %
BILIRUB SERPL-MCNC: 0.3 MG/DL
BUN SERPL-MCNC: 18 MG/DL (ref 8–23)
CALCIUM SERPL-MCNC: 9.7 MG/DL (ref 8.8–10.4)
CHLORIDE SERPL-SCNC: 104 MMOL/L (ref 98–107)
CREAT SERPL-MCNC: 0.72 MG/DL (ref 0.67–1.17)
EGFRCR SERPLBLD CKD-EPI 2021: >90 ML/MIN/1.73M2
EOSINOPHIL # BLD AUTO: <0.03 10E3/UL (ref 0–0.7)
EOSINOPHIL NFR BLD AUTO: 0.1 %
ERYTHROCYTE [DISTWIDTH] IN BLOOD BY AUTOMATED COUNT: 11.9 % (ref 10–15)
GLUCOSE SERPL-MCNC: 107 MG/DL (ref 70–99)
HCO3 SERPL-SCNC: 25 MMOL/L (ref 22–29)
HCT VFR BLD AUTO: 45.3 % (ref 40–53)
HGB BLD-MCNC: 14.8 G/DL (ref 13.3–17.7)
IMM GRANULOCYTES # BLD: 0.06 10E3/UL
IMM GRANULOCYTES NFR BLD: 0.8 %
LYMPHOCYTES # BLD AUTO: 1.82 10E3/UL (ref 0.8–5.3)
LYMPHOCYTES NFR BLD AUTO: 23 %
MCH RBC QN AUTO: 29.3 PG (ref 26.5–33)
MCHC RBC AUTO-ENTMCNC: 32.7 G/DL (ref 31.5–36.5)
MCV RBC AUTO: 89.7 FL (ref 78–100)
MONOCYTES # BLD AUTO: 0.56 10E3/UL (ref 0–1.3)
MONOCYTES NFR BLD AUTO: 7.1 %
NEUTROPHILS # BLD AUTO: 5.45 10E3/UL (ref 1.6–8.3)
NEUTROPHILS NFR BLD AUTO: 68.6 %
NRBC # BLD AUTO: <0.03 10E3/UL
NRBC BLD AUTO-RTO: 0 /100
PLATELET # BLD AUTO: 510 10E3/UL (ref 150–450)
POTASSIUM SERPL-SCNC: 4.7 MMOL/L (ref 3.4–5.3)
PROT SERPL-MCNC: 6.7 G/DL (ref 6.4–8.3)
PSA SERPL DL<=0.01 NG/ML-MCNC: 0.94 NG/ML (ref 0–6.5)
RBC # BLD AUTO: 5.05 10E6/UL (ref 4.4–5.9)
SODIUM SERPL-SCNC: 142 MMOL/L (ref 135–145)
WBC # BLD AUTO: 7.93 10E3/UL (ref 4–11)

## 2025-08-25 ENCOUNTER — ONCOLOGY VISIT (OUTPATIENT)
Dept: ONCOLOGY | Facility: CLINIC | Age: 80
End: 2025-08-25
Attending: INTERNAL MEDICINE
Payer: COMMERCIAL

## 2025-08-25 VITALS
OXYGEN SATURATION: 98 % | HEART RATE: 75 BPM | BODY MASS INDEX: 24.87 KG/M2 | DIASTOLIC BLOOD PRESSURE: 86 MMHG | WEIGHT: 193.7 LBS | RESPIRATION RATE: 16 BRPM | SYSTOLIC BLOOD PRESSURE: 154 MMHG | TEMPERATURE: 97.7 F

## 2025-08-25 DIAGNOSIS — C61 MALIGNANT NEOPLASM OF PROSTATE (H): Primary | ICD-10-CM

## 2025-08-25 PROCEDURE — 99215 OFFICE O/P EST HI 40 MIN: CPT | Performed by: INTERNAL MEDICINE

## 2025-08-25 PROCEDURE — G2211 COMPLEX E/M VISIT ADD ON: HCPCS | Performed by: INTERNAL MEDICINE

## 2025-08-25 PROCEDURE — G0463 HOSPITAL OUTPT CLINIC VISIT: HCPCS | Performed by: INTERNAL MEDICINE

## 2025-08-25 RX ORDER — TRAMADOL HYDROCHLORIDE 50 MG/1
TABLET ORAL
COMMUNITY
Start: 2025-08-12

## 2025-08-25 ASSESSMENT — PAIN SCALES - GENERAL: PAINLEVEL_OUTOF10: MODERATE PAIN (4)

## 2025-08-26 ENCOUNTER — PATIENT OUTREACH (OUTPATIENT)
Dept: ONCOLOGY | Facility: CLINIC | Age: 80
End: 2025-08-26
Payer: COMMERCIAL

## (undated) DEVICE — ADH SKIN CLOSURE PREMIERPRO EXOFIN 1.0ML 3470

## (undated) DEVICE — DAVINCI XI MONOPOLAR SCISSORS HOT SHEARS 8MM 470179

## (undated) DEVICE — EYE CANN IRR 27GA ANTERIOR CHAMBER 581280

## (undated) DEVICE — EYE KNIFE SLIT XSTAR VISITEC 2.6MM 45DEG 373726

## (undated) DEVICE — Device

## (undated) DEVICE — SOL NACL 0.9% INJ 1000ML BAG 2B1324X

## (undated) DEVICE — TAPE DURAPORE 3" SILK 1538-3

## (undated) DEVICE — LINEN TOWEL PACK X5 5464

## (undated) DEVICE — DAVINCI XI ESU FORCE BIPOLAR 8MM 471405

## (undated) DEVICE — GOWN IMPERVIOUS SPECIALTY XLG/XLONG 32474

## (undated) DEVICE — SU MONOCRYL 3-0 RB-1 27" UND Y215H

## (undated) DEVICE — DAVINCI XI SEAL UNIVERSAL 5-8MM 470361

## (undated) DEVICE — ENDO POUCH UNIV RETRIEVAL SYSTEM INZII 10MM CD001

## (undated) DEVICE — DRSG TELFA 3X8" 1238

## (undated) DEVICE — SU VICRYL 3-0 SH 27" J316H

## (undated) DEVICE — DAVINCI XI GRASPER PROGRASP 8MM EXT 471093

## (undated) DEVICE — EYE PACK CUSTOM ANTERIOR 30DEG TIP CENTURION PPK6682-04

## (undated) DEVICE — EYE TIP IRRIGATION & ASPIRATION POLYMER CVD 0.3MM 8065751512

## (undated) DEVICE — DRAPE IOBAN INCISE 23X17" 6650EZ

## (undated) DEVICE — SU MONOCRYL 4-0 PS-2 18" UND Y496G

## (undated) DEVICE — LINEN ORTHO PACK 5446

## (undated) DEVICE — DECANTER TRANSFER DEVICE 2008S

## (undated) DEVICE — PACK CATARACT CUSTOM ASC SEY15CPUMC

## (undated) DEVICE — SU ETHILON 2-0 PS 18" 585H

## (undated) DEVICE — DRAIN JACKSON PRATT RESERVOIR 100ML SU130-1305

## (undated) DEVICE — GLOVE PROTEXIS MICRO 6.5  2D73PM65

## (undated) DEVICE — PREP CHLORAPREP 26ML TINTED HI-LITE ORANGE 930815

## (undated) DEVICE — DRAPE CONVERTORS U-DRAPE 60X72" 8476

## (undated) DEVICE — EYE CANN IRR 25GA CYSTOTOME 581610

## (undated) DEVICE — SU VICRYL 0 CT-1 36" J346H

## (undated) DEVICE — SOL WATER IRRIG 1000ML BOTTLE 2F7114

## (undated) DEVICE — SUCTION MANIFOLD NEPTUNE 2 SYS 4 PORT 0702-020-000

## (undated) DEVICE — DAVINCI XI DRIVER NDL LARGE 8MM EXT 471006

## (undated) DEVICE — DAVINCI XI DRAPE COLUMN 470341

## (undated) DEVICE — SYR PISTON IRRIGATION 60 ML DYND20325

## (undated) DEVICE — CATH TRAY FOLEY SURESTEP 16FR WDRAIN BAG STLK LATEX A300316A

## (undated) DEVICE — DRSG DRAIN 4X4" 7086

## (undated) DEVICE — DAVINCI HOT SHEARS TIP COVER  400180

## (undated) DEVICE — COVER CAMERA IN-LIGHT DISP LT-C02

## (undated) DEVICE — EYE SHIELD PLASTIC

## (undated) DEVICE — SU MONOCRYL 3-0 RB-1 27" Y305H

## (undated) DEVICE — JELLY LUBRICATING SURGILUBE 2OZ TUBE 281020502

## (undated) DEVICE — DAVINCI XI DRAPE ARM 470015

## (undated) DEVICE — TUBING SUCTION MEDI-VAC 1/4"X20' N620A

## (undated) DEVICE — CLIP ENDO HEMO-LOC PURPLE LG 544240

## (undated) DEVICE — EYE KNIFE STILETTO VISITEC 1.1MM ANG 45DEG SIDEPORT 376620

## (undated) DEVICE — SYSTEM LAPAROVUE VISIBILITY LAPVUE10

## (undated) DEVICE — TUBING CONMED AIRSEAL SMOKE EVAC INSUFFLATION ASM-EVAC

## (undated) DEVICE — SURGICEL HEMOSTAT 4X8" 1952

## (undated) DEVICE — EYE SOL BSS 500ML BAG 0065-1795-04

## (undated) DEVICE — ENDO TROCAR CONMED AIRSEAL BLADELESS 12X120MM IAS12-120LP

## (undated) DEVICE — BLADE SWITCH SCISSORS TIP 5MM 89-5100B

## (undated) DEVICE — SU VICRYL 3-0 SH 27" UND J416H

## (undated) DEVICE — GLOVE BIOGEL PI MICRO SZ 7.5 48575

## (undated) DEVICE — WIPES FOLEY CARE SURESTEP PROVON DFC100

## (undated) DEVICE — SU MONOCRYL 5-0 P-3 18" UND Y493G

## (undated) DEVICE — LINEN GOWN X4 5410

## (undated) DEVICE — DRAIN JACKSON PRATT ROUND SIL 19FR W/TROCAR LF JP-2232

## (undated) DEVICE — ATTENTION CASE CART PLEASE PICK

## (undated) DEVICE — SU STRATAFIX PDS PLUS 3-0 SPIRAL SH 15CM SXPP1B420

## (undated) DEVICE — BLADE CLIPPER SGL USE 9680

## (undated) DEVICE — SOL NACL 0.9% IRRIG 1000ML BOTTLE 2F7124

## (undated) DEVICE — KIT PATIENT POSITIONING PINK PAD EXT 40601

## (undated) DEVICE — SU VICRYL 0 UR-6 27" J603H

## (undated) DEVICE — DRAPE TIBURON TOP SHEET 100X60" 29352

## (undated) RX ORDER — ONDANSETRON 2 MG/ML
INJECTION INTRAMUSCULAR; INTRAVENOUS
Status: DISPENSED
Start: 2023-04-07

## (undated) RX ORDER — ACETAMINOPHEN 325 MG/1
TABLET ORAL
Status: DISPENSED
Start: 2023-04-07

## (undated) RX ORDER — FENTANYL CITRATE 50 UG/ML
INJECTION, SOLUTION INTRAMUSCULAR; INTRAVENOUS
Status: DISPENSED
Start: 2023-04-07

## (undated) RX ORDER — HYDROMORPHONE HYDROCHLORIDE 1 MG/ML
INJECTION, SOLUTION INTRAMUSCULAR; INTRAVENOUS; SUBCUTANEOUS
Status: DISPENSED
Start: 2023-04-07

## (undated) RX ORDER — ONDANSETRON 2 MG/ML
INJECTION INTRAMUSCULAR; INTRAVENOUS
Status: DISPENSED
Start: 2019-01-18

## (undated) RX ORDER — ACETAMINOPHEN 325 MG/1
TABLET ORAL
Status: DISPENSED
Start: 2019-01-18

## (undated) RX ORDER — ONDANSETRON 2 MG/ML
INJECTION INTRAMUSCULAR; INTRAVENOUS
Status: DISPENSED
Start: 2019-01-25

## (undated) RX ORDER — BUPIVACAINE HYDROCHLORIDE 2.5 MG/ML
INJECTION, SOLUTION EPIDURAL; INFILTRATION; INTRACAUDAL
Status: DISPENSED
Start: 2023-04-07

## (undated) RX ORDER — CEFAZOLIN SODIUM/WATER 2 G/20 ML
SYRINGE (ML) INTRAVENOUS
Status: DISPENSED
Start: 2023-04-07

## (undated) RX ORDER — GENTAMICIN 40 MG/ML
INJECTION, SOLUTION INTRAMUSCULAR; INTRAVENOUS
Status: DISPENSED
Start: 2021-07-05

## (undated) RX ORDER — LIDOCAINE HYDROCHLORIDE 10 MG/ML
INJECTION, SOLUTION EPIDURAL; INFILTRATION; INTRACAUDAL; PERINEURAL
Status: DISPENSED
Start: 2021-07-05

## (undated) RX ORDER — EPHEDRINE SULFATE 50 MG/ML
INJECTION, SOLUTION INTRAMUSCULAR; INTRAVENOUS; SUBCUTANEOUS
Status: DISPENSED
Start: 2023-04-07

## (undated) RX ORDER — FENTANYL CITRATE 50 UG/ML
INJECTION, SOLUTION INTRAMUSCULAR; INTRAVENOUS
Status: DISPENSED
Start: 2019-01-18

## (undated) RX ORDER — KETOROLAC TROMETHAMINE 30 MG/ML
INJECTION, SOLUTION INTRAMUSCULAR; INTRAVENOUS
Status: DISPENSED
Start: 2023-04-07

## (undated) RX ORDER — FENTANYL CITRATE 50 UG/ML
INJECTION, SOLUTION INTRAMUSCULAR; INTRAVENOUS
Status: DISPENSED
Start: 2019-01-25

## (undated) RX ORDER — CEFAZOLIN SODIUM 1 G/3ML
INJECTION, POWDER, FOR SOLUTION INTRAMUSCULAR; INTRAVENOUS
Status: DISPENSED
Start: 2023-04-07

## (undated) RX ORDER — ACETAMINOPHEN 325 MG/1
TABLET ORAL
Status: DISPENSED
Start: 2019-01-25

## (undated) RX ORDER — DEXAMETHASONE SODIUM PHOSPHATE 4 MG/ML
INJECTION, SOLUTION INTRA-ARTICULAR; INTRALESIONAL; INTRAMUSCULAR; INTRAVENOUS; SOFT TISSUE
Status: DISPENSED
Start: 2023-04-07

## (undated) RX ORDER — LIDOCAINE HYDROCHLORIDE 10 MG/ML
INJECTION, SOLUTION EPIDURAL; INFILTRATION; INTRACAUDAL; PERINEURAL
Status: DISPENSED
Start: 2023-01-30

## (undated) RX ORDER — OXYCODONE HYDROCHLORIDE 5 MG/1
TABLET ORAL
Status: DISPENSED
Start: 2023-04-07

## (undated) RX ORDER — GENTAMICIN 40 MG/ML
INJECTION, SOLUTION INTRAMUSCULAR; INTRAVENOUS
Status: DISPENSED
Start: 2023-01-30